# Patient Record
Sex: MALE | Race: WHITE | NOT HISPANIC OR LATINO | Employment: OTHER | URBAN - METROPOLITAN AREA
[De-identification: names, ages, dates, MRNs, and addresses within clinical notes are randomized per-mention and may not be internally consistent; named-entity substitution may affect disease eponyms.]

---

## 2019-04-15 ENCOUNTER — OFFICE VISIT (OUTPATIENT)
Dept: UROLOGY | Facility: CLINIC | Age: 59
End: 2019-04-15
Payer: COMMERCIAL

## 2019-04-15 VITALS
HEART RATE: 92 BPM | HEIGHT: 70 IN | DIASTOLIC BLOOD PRESSURE: 92 MMHG | WEIGHT: 175 LBS | SYSTOLIC BLOOD PRESSURE: 149 MMHG | BODY MASS INDEX: 25.05 KG/M2

## 2019-04-15 DIAGNOSIS — R35.0 URINARY FREQUENCY: ICD-10-CM

## 2019-04-15 DIAGNOSIS — N40.1 BPH WITH OBSTRUCTION/LOWER URINARY TRACT SYMPTOMS: ICD-10-CM

## 2019-04-15 DIAGNOSIS — R30.0 DYSURIA: Primary | ICD-10-CM

## 2019-04-15 DIAGNOSIS — N13.8 BPH WITH OBSTRUCTION/LOWER URINARY TRACT SYMPTOMS: ICD-10-CM

## 2019-04-15 PROCEDURE — 87086 URINE CULTURE/COLONY COUNT: CPT

## 2019-04-15 PROCEDURE — 99204 OFFICE O/P NEW MOD 45 MIN: CPT | Mod: S$GLB,,, | Performed by: NURSE PRACTITIONER

## 2019-04-15 PROCEDURE — 3008F BODY MASS INDEX DOCD: CPT | Mod: CPTII,S$GLB,, | Performed by: NURSE PRACTITIONER

## 2019-04-15 PROCEDURE — 3008F PR BODY MASS INDEX (BMI) DOCUMENTED: ICD-10-PCS | Mod: CPTII,S$GLB,, | Performed by: NURSE PRACTITIONER

## 2019-04-15 PROCEDURE — 99204 PR OFFICE/OUTPT VISIT, NEW, LEVL IV, 45-59 MIN: ICD-10-PCS | Mod: S$GLB,,, | Performed by: NURSE PRACTITIONER

## 2019-04-15 RX ORDER — TAMSULOSIN HYDROCHLORIDE 0.4 MG/1
0.4 CAPSULE ORAL DAILY
Qty: 30 CAPSULE | Refills: 11 | Status: SHIPPED | OUTPATIENT
Start: 2019-04-15 | End: 2020-02-12

## 2019-04-15 NOTE — PROGRESS NOTES
Subjective:      Gt Banerjee is a 58 y.o. male who was self-referred for evaluation of his urinary symptoms.      The patient presents today reporting dysuria x 1 week with worsening frequency and urgency. He reports a slow stream and straining to empty his bladder that has been present for months, also with frequency, urgency and nocturia 1-3x/night. Denies hematuria, flank pain and fever/chills. Denies a history of recurrent UTIs, prostatitis, and nephrolithiasis. Reports normal PSA levels (no records available). Denies a family history of prostate cancer.     The following portions of the patient's history were reviewed and updated as appropriate: allergies, current medications, past family history, past medical history, past social history, past surgical history and problem list.    Review of Systems  Constitutional: no fever or chills  ENT: no nasal congestion or sore throat  Respiratory: no cough or shortness of breath  Cardiovascular: no chest pain or palpitations  Gastrointestinal: no nausea or vomiting, tolerating diet  Genitourinary: as per HPI  Hematologic/Lymphatic: no easy bruising or lymphadenopathy  Musculoskeletal: no arthralgias or myalgias  Neurological: no seizures or tremors  Behavioral/Psych: no auditory or visual hallucinations     Objective:   Vitals:   Vitals:    04/15/19 0905   BP: (!) 149/92   Pulse: 92     Physical Exam   General: alert and oriented, no acute distress  Head: normocephalic, atraumatic  Neck: supple, no lymphadenopathy, normal ROM, no masses  Respiratory: Symmetric expansion, non-labored breathing  Cardiovascular: regular rate and rhythm, nomal pulses, no peripheral edema  Abdomen: soft, non tender, non distended, no palpable masses, no hernias, no hepatomegaly or splenomegaly  Genitourinary:   Prostate: enlarged: bilateral, size: 30 gm; seminal vesicles not palpated  Rectum: normal rectal tone, no rectal mass, normal perineum  Lymphatic: no inguinal nodes  Skin:  normal coloration and turgor, no rashes, no suspicious skin lesions noted  Neuro: alert and oriented x3, no gross deficits  Psych: normal judgment and insight, normal mood/affect and non-anxious  No CVA tenderness     Lab Review   Urinalysis demonstrates positive for leukocytes (trace)  Lab Results   Component Value Date    WBC 4.21 (L) 07/12/2011    HGB 14.5 07/12/2011    HCT 42.8 07/12/2011    MCV 93.2 07/12/2011     07/12/2011     Lab Results   Component Value Date    CREATININE 0.9 07/12/2011    BUN 18 07/12/2011     No results found for: PSA  Imaging   None    Assessment:   Dysuria  BPH   Urinary frequency    Plan:   Gt was seen today for dysuria.    Diagnoses and all orders for this visit:    Dysuria  -     Urine culture  -     tamsulosin (FLOMAX) 0.4 mg Cap; Take 1 capsule (0.4 mg total) by mouth once daily.    BPH with obstruction/lower urinary tract symptoms  -     Prostate Specific Antigen, Diagnostic; Future    Urinary frequency    Plan:  --Urine culture today, will notify if antibiotics are needed (pt prefers to hold on antibiotics for now)   --Trial of Flomax, reviewed possible SE   --PSA in 1 month  --Follow up in 6-8 weeks with PVR

## 2019-04-16 LAB — BACTERIA UR CULT: NO GROWTH

## 2019-07-17 ENCOUNTER — PATIENT MESSAGE (OUTPATIENT)
Dept: NEUROLOGY | Facility: CLINIC | Age: 59
End: 2019-07-17

## 2019-07-31 ENCOUNTER — LAB VISIT (OUTPATIENT)
Dept: LAB | Facility: HOSPITAL | Age: 59
End: 2019-07-31
Payer: COMMERCIAL

## 2019-07-31 ENCOUNTER — OFFICE VISIT (OUTPATIENT)
Dept: NEUROLOGY | Facility: CLINIC | Age: 59
End: 2019-07-31
Payer: COMMERCIAL

## 2019-07-31 VITALS
DIASTOLIC BLOOD PRESSURE: 86 MMHG | BODY MASS INDEX: 25.05 KG/M2 | HEIGHT: 70 IN | SYSTOLIC BLOOD PRESSURE: 131 MMHG | WEIGHT: 175 LBS | HEART RATE: 62 BPM

## 2019-07-31 DIAGNOSIS — Z86.73 HISTORY OF EMBOLIC STROKE: Primary | ICD-10-CM

## 2019-07-31 DIAGNOSIS — N40.1 BENIGN PROSTATIC HYPERPLASIA WITH URINARY FREQUENCY: ICD-10-CM

## 2019-07-31 DIAGNOSIS — R03.0 BORDERLINE SYSTOLIC HTN: ICD-10-CM

## 2019-07-31 DIAGNOSIS — R35.0 BENIGN PROSTATIC HYPERPLASIA WITH URINARY FREQUENCY: ICD-10-CM

## 2019-07-31 DIAGNOSIS — Z86.73 HISTORY OF ISCHEMIC VERTEBROBASILAR ARTERY BRAINSTEM STROKE: ICD-10-CM

## 2019-07-31 DIAGNOSIS — I63.9 LEFT PONTINE STROKE: ICD-10-CM

## 2019-07-31 DIAGNOSIS — N40.1 BPH WITH OBSTRUCTION/LOWER URINARY TRACT SYMPTOMS: ICD-10-CM

## 2019-07-31 DIAGNOSIS — N13.8 BPH WITH OBSTRUCTION/LOWER URINARY TRACT SYMPTOMS: ICD-10-CM

## 2019-07-31 DIAGNOSIS — Z86.73 HISTORY OF EMBOLIC STROKE: ICD-10-CM

## 2019-07-31 DIAGNOSIS — H51.22 INO (INTERNUCLEAR OPHTHALMOPLEGIA), LEFT: ICD-10-CM

## 2019-07-31 LAB
CHOLEST SERPL-MCNC: 213 MG/DL (ref 120–199)
CHOLEST/HDLC SERPL: 3.2 {RATIO} (ref 2–5)
COMPLEXED PSA SERPL-MCNC: 3.5 NG/ML (ref 0–4)
CREAT SERPL-MCNC: 0.9 MG/DL (ref 0.5–1.4)
EST. GFR  (AFRICAN AMERICAN): >60 ML/MIN/1.73 M^2
EST. GFR  (NON AFRICAN AMERICAN): >60 ML/MIN/1.73 M^2
ESTIMATED AVG GLUCOSE: 94 MG/DL (ref 68–131)
HBA1C MFR BLD HPLC: 4.9 % (ref 4–5.6)
HDLC SERPL-MCNC: 66 MG/DL (ref 40–75)
HDLC SERPL: 31 % (ref 20–50)
LDLC SERPL CALC-MCNC: 129 MG/DL (ref 63–159)
NONHDLC SERPL-MCNC: 147 MG/DL
TRIGL SERPL-MCNC: 90 MG/DL (ref 30–150)

## 2019-07-31 PROCEDURE — 83036 HEMOGLOBIN GLYCOSYLATED A1C: CPT

## 2019-07-31 PROCEDURE — 82565 ASSAY OF CREATININE: CPT

## 2019-07-31 PROCEDURE — 99205 OFFICE O/P NEW HI 60 MIN: CPT | Mod: S$GLB,,, | Performed by: PSYCHIATRY & NEUROLOGY

## 2019-07-31 PROCEDURE — 3008F BODY MASS INDEX DOCD: CPT | Mod: CPTII,S$GLB,, | Performed by: PSYCHIATRY & NEUROLOGY

## 2019-07-31 PROCEDURE — 3008F PR BODY MASS INDEX (BMI) DOCUMENTED: ICD-10-PCS | Mod: CPTII,S$GLB,, | Performed by: PSYCHIATRY & NEUROLOGY

## 2019-07-31 PROCEDURE — 99999 PR PBB SHADOW E&M-EST. PATIENT-LVL IV: ICD-10-PCS | Mod: PBBFAC,,, | Performed by: PSYCHIATRY & NEUROLOGY

## 2019-07-31 PROCEDURE — 84153 ASSAY OF PSA TOTAL: CPT

## 2019-07-31 PROCEDURE — 80061 LIPID PANEL: CPT

## 2019-07-31 PROCEDURE — 99205 PR OFFICE/OUTPT VISIT, NEW, LEVL V, 60-74 MIN: ICD-10-PCS | Mod: S$GLB,,, | Performed by: PSYCHIATRY & NEUROLOGY

## 2019-07-31 PROCEDURE — 36415 COLL VENOUS BLD VENIPUNCTURE: CPT

## 2019-07-31 PROCEDURE — 99999 PR PBB SHADOW E&M-EST. PATIENT-LVL IV: CPT | Mod: PBBFAC,,, | Performed by: PSYCHIATRY & NEUROLOGY

## 2019-07-31 RX ORDER — CLOPIDOGREL BISULFATE 75 MG/1
75 TABLET ORAL DAILY
Qty: 30 TABLET | Refills: 0 | Status: SHIPPED | OUTPATIENT
Start: 2019-07-31 | End: 2020-11-16
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
## 2019-07-31 NOTE — PATIENT INSTRUCTIONS
- Continue aspirin 81 mg daily. Start Plavix 75 mg daily x 1 month.  - Have blood work drawn today.  - Schedule CTA head/neck.  - Consider echocardiogram.

## 2019-08-01 ENCOUNTER — PATIENT MESSAGE (OUTPATIENT)
Dept: NEUROLOGY | Facility: CLINIC | Age: 59
End: 2019-08-01

## 2019-08-01 ENCOUNTER — PATIENT MESSAGE (OUTPATIENT)
Dept: NEUROLOGY | Facility: HOSPITAL | Age: 59
End: 2019-08-01

## 2019-08-01 PROBLEM — I63.9 LEFT PONTINE STROKE: Status: ACTIVE | Noted: 2019-08-01

## 2019-08-01 PROBLEM — H51.22 INO (INTERNUCLEAR OPHTHALMOPLEGIA), LEFT: Status: ACTIVE | Noted: 2019-08-01

## 2019-08-01 PROBLEM — R03.0 BORDERLINE SYSTOLIC HTN: Status: ACTIVE | Noted: 2019-08-01

## 2019-08-01 PROBLEM — R35.0 BENIGN PROSTATIC HYPERPLASIA WITH URINARY FREQUENCY: Status: ACTIVE | Noted: 2019-08-01

## 2019-08-01 PROBLEM — H51.22 INO (INTERNUCLEAR OPHTHALMOPLEGIA), LEFT: Status: RESOLVED | Noted: 2019-08-01 | Resolved: 2019-08-01

## 2019-08-01 PROBLEM — I63.9 LEFT PONTINE STROKE: Status: RESOLVED | Noted: 2019-08-01 | Resolved: 2019-08-01

## 2019-08-01 PROBLEM — Z86.73 HISTORY OF ISCHEMIC VERTEBROBASILAR ARTERY BRAINSTEM STROKE: Status: ACTIVE | Noted: 2019-08-01

## 2019-08-01 PROBLEM — N40.1 BENIGN PROSTATIC HYPERPLASIA WITH URINARY FREQUENCY: Status: ACTIVE | Noted: 2019-08-01

## 2019-08-02 RX ORDER — ATORVASTATIN CALCIUM 20 MG/1
20 TABLET, FILM COATED ORAL DAILY
Qty: 90 TABLET | Refills: 3 | Status: SHIPPED | OUTPATIENT
Start: 2019-08-02 | End: 2020-11-18

## 2019-08-06 ENCOUNTER — HOSPITAL ENCOUNTER (OUTPATIENT)
Dept: RADIOLOGY | Facility: HOSPITAL | Age: 59
Discharge: HOME | End: 2019-08-06
Attending: PSYCHIATRY & NEUROLOGY
Payer: COMMERCIAL

## 2019-08-06 DIAGNOSIS — Z86.73 HISTORY OF EMBOLIC STROKE: ICD-10-CM

## 2019-08-06 PROCEDURE — 70498 CTA HEAD AND NECK (XPD): ICD-10-PCS | Mod: 26,,, | Performed by: RADIOLOGY

## 2019-08-06 PROCEDURE — 70496 CT ANGIOGRAPHY HEAD: CPT | Mod: 26,,, | Performed by: RADIOLOGY

## 2019-08-06 PROCEDURE — 70496 CTA HEAD AND NECK (XPD): ICD-10-PCS | Mod: 26,,, | Performed by: RADIOLOGY

## 2019-08-06 PROCEDURE — 70498 CT ANGIOGRAPHY NECK: CPT | Mod: 26,,, | Performed by: RADIOLOGY

## 2019-08-06 PROCEDURE — 25500020 PHARM REV CODE 255: Performed by: PSYCHIATRY & NEUROLOGY

## 2019-08-06 PROCEDURE — 70496 CT ANGIOGRAPHY HEAD: CPT | Mod: TC

## 2019-08-06 RX ADMIN — IOHEXOL 100 ML: 350 INJECTION, SOLUTION INTRAVENOUS at 09:08

## 2019-08-07 ENCOUNTER — PATIENT MESSAGE (OUTPATIENT)
Dept: NEUROLOGY | Facility: CLINIC | Age: 59
End: 2019-08-07

## 2019-08-08 ENCOUNTER — PATIENT MESSAGE (OUTPATIENT)
Dept: NEUROLOGY | Facility: CLINIC | Age: 59
End: 2019-08-08

## 2019-08-22 ENCOUNTER — PATIENT MESSAGE (OUTPATIENT)
Dept: NEUROLOGY | Facility: CLINIC | Age: 59
End: 2019-08-22

## 2019-09-12 ENCOUNTER — PATIENT MESSAGE (OUTPATIENT)
Dept: NEUROLOGY | Facility: CLINIC | Age: 59
End: 2019-09-12

## 2019-09-13 DIAGNOSIS — Z86.73 HISTORY OF ISCHEMIC VERTEBROBASILAR ARTERY BRAINSTEM STROKE: Primary | ICD-10-CM

## 2019-09-23 ENCOUNTER — HOSPITAL ENCOUNTER (OUTPATIENT)
Dept: CARDIOLOGY | Facility: CLINIC | Age: 59
Discharge: HOME | End: 2019-09-23
Attending: PSYCHIATRY & NEUROLOGY
Payer: COMMERCIAL

## 2019-09-23 VITALS
DIASTOLIC BLOOD PRESSURE: 78 MMHG | BODY MASS INDEX: 25.05 KG/M2 | HEART RATE: 54 BPM | WEIGHT: 175 LBS | HEIGHT: 70 IN | SYSTOLIC BLOOD PRESSURE: 120 MMHG

## 2019-09-23 DIAGNOSIS — Z86.73 HISTORY OF ISCHEMIC VERTEBROBASILAR ARTERY BRAINSTEM STROKE: ICD-10-CM

## 2019-09-23 LAB
ASCENDING AORTA: 3.53 CM
AV INDEX (PROSTH): 0.87
AV MEAN GRADIENT: 2 MMHG
AV PEAK GRADIENT: 4 MMHG
AV VALVE AREA: 3.67 CM2
AV VELOCITY RATIO: 0.81
BSA FOR ECHO PROCEDURE: 1.98 M2
CV ECHO LV RWT: 0.35 CM
DOP CALC AO PEAK VEL: 1.01 M/S
DOP CALC AO VTI: 22.48 CM
DOP CALC LVOT AREA: 4.2 CM2
DOP CALC LVOT DIAMETER: 2.32 CM
DOP CALC LVOT PEAK VEL: 0.82 M/S
DOP CALC LVOT STROKE VOLUME: 82.6 CM3
DOP CALCLVOT PEAK VEL VTI: 19.55 CM
E WAVE DECELERATION TIME: 270.3 MSEC
E/A RATIO: 0.92
E/E' RATIO: 6.13 M/S
ECHO LV POSTERIOR WALL: 0.87 CM (ref 0.6–1.1)
FRACTIONAL SHORTENING: 34 % (ref 28–44)
INTERVENTRICULAR SEPTUM: 0.83 CM (ref 0.6–1.1)
IVRT: 0.16 MSEC
LA MAJOR: 5.23 CM
LA MINOR: 5.44 CM
LA WIDTH: 4.35 CM
LEFT ATRIUM SIZE: 4.18 CM
LEFT ATRIUM VOLUME INDEX: 41.8 ML/M2
LEFT ATRIUM VOLUME: 82.42 CM3
LEFT INTERNAL DIMENSION IN SYSTOLE: 3.31 CM (ref 2.1–4)
LEFT VENTRICLE DIASTOLIC VOLUME INDEX: 61.09 ML/M2
LEFT VENTRICLE DIASTOLIC VOLUME: 120.47 ML
LEFT VENTRICLE MASS INDEX: 75 G/M2
LEFT VENTRICLE SYSTOLIC VOLUME INDEX: 22.5 ML/M2
LEFT VENTRICLE SYSTOLIC VOLUME: 44.39 ML
LEFT VENTRICULAR INTERNAL DIMENSION IN DIASTOLE: 5.04 CM (ref 3.5–6)
LEFT VENTRICULAR MASS: 148.83 G
LV LATERAL E/E' RATIO: 5.11 M/S
LV SEPTAL E/E' RATIO: 7.67 M/S
MV PEAK A VEL: 0.5 M/S
MV PEAK E VEL: 0.46 M/S
PISA TR MAX VEL: 1.82 M/S
RA MAJOR: 5.28 CM
RA PRESSURE: 3 MMHG
RA WIDTH: 4.11 CM
RIGHT VENTRICULAR END-DIASTOLIC DIMENSION: 4.99 CM
RV TISSUE DOPPLER FREE WALL SYSTOLIC VELOCITY 1 (APICAL 4 CHAMBER VIEW): 11.58 CM/S
SINUS: 3.76 CM
STJ: 3.45 CM
TDI LATERAL: 0.09 M/S
TDI SEPTAL: 0.06 M/S
TDI: 0.08 M/S
TR MAX PG: 13 MMHG
TRICUSPID ANNULAR PLANE SYSTOLIC EXCURSION: 2.54 CM
TV REST PULMONARY ARTERY PRESSURE: 16 MMHG

## 2019-09-23 PROCEDURE — 93306 TTE W/DOPPLER COMPLETE: CPT | Mod: 26,,, | Performed by: INTERNAL MEDICINE

## 2019-09-23 PROCEDURE — 93306 ECHO (CUPID ONLY): ICD-10-PCS | Mod: 26,,, | Performed by: INTERNAL MEDICINE

## 2019-09-23 PROCEDURE — 93306 TTE W/DOPPLER COMPLETE: CPT

## 2019-09-24 DIAGNOSIS — Z86.73 HISTORY OF ISCHEMIC VERTEBROBASILAR ARTERY BRAINSTEM STROKE: Primary | ICD-10-CM

## 2019-09-26 ENCOUNTER — PATIENT MESSAGE (OUTPATIENT)
Dept: NEUROLOGY | Facility: CLINIC | Age: 59
End: 2019-09-26

## 2019-09-27 ENCOUNTER — TELEPHONE (OUTPATIENT)
Dept: NEUROLOGY | Facility: CLINIC | Age: 59
End: 2019-09-27

## 2019-09-27 NOTE — TELEPHONE ENCOUNTER
----- Message from Meghan Cesar sent at 9/27/2019  2:28 PM CDT -----  Contact: pt   Pt is calling to speak with the nurse about his appt on 10/2/2019 to  his cardiac monitor pt needs to pick it up asap can you please call pt at [phone] jc

## 2019-10-02 ENCOUNTER — CLINICAL SUPPORT (OUTPATIENT)
Dept: CARDIOLOGY | Facility: HOSPITAL | Age: 59
End: 2019-10-02
Attending: PSYCHIATRY & NEUROLOGY
Payer: COMMERCIAL

## 2019-10-02 DIAGNOSIS — Z86.73 HISTORY OF ISCHEMIC VERTEBROBASILAR ARTERY BRAINSTEM STROKE: ICD-10-CM

## 2019-10-02 PROCEDURE — 93271 ECG/MONITORING AND ANALYSIS: CPT

## 2019-10-02 PROCEDURE — 93272 CARDIAC EVENT MONITOR (CUPID ONLY): ICD-10-PCS | Mod: ,,, | Performed by: INTERNAL MEDICINE

## 2019-10-02 PROCEDURE — 93272 ECG/REVIEW INTERPRET ONLY: CPT | Mod: ,,, | Performed by: INTERNAL MEDICINE

## 2019-10-07 ENCOUNTER — PATIENT MESSAGE (OUTPATIENT)
Dept: NEUROLOGY | Facility: CLINIC | Age: 59
End: 2019-10-07

## 2019-10-10 ENCOUNTER — PATIENT MESSAGE (OUTPATIENT)
Dept: NEUROLOGY | Facility: CLINIC | Age: 59
End: 2019-10-10

## 2019-10-10 ENCOUNTER — TELEPHONE (OUTPATIENT)
Dept: CARDIOLOGY | Facility: HOSPITAL | Age: 59
End: 2019-10-10

## 2019-10-10 NOTE — TELEPHONE ENCOUNTER
----- Message from Brook Helm sent at 10/10/2019  4:16 PM CDT -----  Contact: Patient  The pt is calling to speak with someone regarding his holter. Please call him back @ [phone]. Thanks, Brook

## 2019-10-11 NOTE — PROGRESS NOTES
Patient received alert from cardiac monitoring company regarding bradycardia (39 while sleeping).  He was asymptomatic.  Confirmed with EP that rhythm was sinus bradycardia due to 1st degree AV block.  Patient requests referral to cardiology for further evaluation.  Order placed.    Ely Smiley MD  Vascular Neurology

## 2019-10-18 ENCOUNTER — OFFICE VISIT (OUTPATIENT)
Dept: CARDIOLOGY | Facility: CLINIC | Age: 59
End: 2019-10-18
Payer: COMMERCIAL

## 2019-10-18 VITALS
BODY MASS INDEX: 26.82 KG/M2 | DIASTOLIC BLOOD PRESSURE: 79 MMHG | HEIGHT: 70 IN | SYSTOLIC BLOOD PRESSURE: 132 MMHG | WEIGHT: 187.38 LBS | HEART RATE: 49 BPM

## 2019-10-18 DIAGNOSIS — Z86.73 HISTORY OF ISCHEMIC VERTEBROBASILAR ARTERY BRAINSTEM STROKE: Primary | ICD-10-CM

## 2019-10-18 DIAGNOSIS — R03.0 BORDERLINE SYSTOLIC HTN: ICD-10-CM

## 2019-10-18 DIAGNOSIS — R03.0 BORDERLINE SYSTOLIC HTN: Primary | ICD-10-CM

## 2019-10-18 PROCEDURE — 99999 PR PBB SHADOW E&M-EST. PATIENT-LVL III: ICD-10-PCS | Mod: PBBFAC,,, | Performed by: INTERNAL MEDICINE

## 2019-10-18 PROCEDURE — 99244 OFF/OP CNSLTJ NEW/EST MOD 40: CPT | Mod: S$GLB,,, | Performed by: INTERNAL MEDICINE

## 2019-10-18 PROCEDURE — 99999 PR PBB SHADOW E&M-EST. PATIENT-LVL III: CPT | Mod: PBBFAC,,, | Performed by: INTERNAL MEDICINE

## 2019-10-18 PROCEDURE — 99244 PR OFFICE CONSULTATION,LEVEL IV: ICD-10-PCS | Mod: S$GLB,,, | Performed by: INTERNAL MEDICINE

## 2019-10-18 PROCEDURE — 93000 ELECTROCARDIOGRAM COMPLETE: CPT | Mod: S$GLB,,, | Performed by: INTERNAL MEDICINE

## 2019-10-18 PROCEDURE — 93000 EKG 12-LEAD: ICD-10-PCS | Mod: S$GLB,,, | Performed by: INTERNAL MEDICINE

## 2019-10-18 RX ORDER — ASPIRIN 81 MG/1
81 TABLET ORAL DAILY
COMMUNITY

## 2019-10-18 NOTE — LETTER
October 18, 2019      Ely Smiley MD  [address]  [facility] [address]           [facility] Card[facility]  [address]  [facility] [address]  Phone: [phone]          Patient: Gt Banerjee   MR Number: [id number]   YOB: 1960   Date of Visit: 10/18/2019       Dear Dr. Ely Smiley:    Thank you for referring Gt Banerjee to me for evaluation. Attached you will find relevant portions of my assessment and plan of care.    If you have questions, please do not hesitate to call me. I look forward to following Gt Banerjee along with you.    Sincerely,    Walter Braga MD    Enclosure  CC:  No Recipients    If you would like to receive this communication electronically, please contact [email or web address] or [phone] to request more information on [employer] access.    For providers and/or their staff who would like to refer a patient to [facility], please contact us through our one-stop-shop provider referral line, [facility] , at [phone].    If you feel you have received this communication in error or would no longer like to receive these types of communications, please e-mail [email or web address]
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
## 2019-10-25 ENCOUNTER — HOSPITAL ENCOUNTER (OUTPATIENT)
Dept: CARDIOLOGY | Facility: CLINIC | Age: 59
Discharge: HOME | End: 2019-10-25
Attending: INTERNAL MEDICINE
Payer: COMMERCIAL

## 2019-10-25 VITALS
HEART RATE: 70 BPM | WEIGHT: 187 LBS | BODY MASS INDEX: 26.77 KG/M2 | SYSTOLIC BLOOD PRESSURE: 138 MMHG | HEIGHT: 70 IN | DIASTOLIC BLOOD PRESSURE: 64 MMHG

## 2019-10-25 DIAGNOSIS — Z86.73 HISTORY OF ISCHEMIC VERTEBROBASILAR ARTERY BRAINSTEM STROKE: ICD-10-CM

## 2019-10-25 LAB
ASCENDING AORTA: 3.96 CM
BSA FOR ECHO PROCEDURE: 2.05 M2
CV ECHO LV RWT: 0.3 CM
DOP CALC LVOT AREA: 4.1 CM2
DOP CALC LVOT DIAMETER: 2.28 CM
DOP CALC LVOT PEAK VEL: 0.87 M/S
DOP CALC LVOT STROKE VOLUME: 75.82 CM3
DOP CALCLVOT PEAK VEL VTI: 18.58 CM
E WAVE DECELERATION TIME: 186.65 MSEC
E/A RATIO: 0.47
E/E' RATIO: 4.88 M/S
ECHO LV POSTERIOR WALL: 0.83 CM (ref 0.6–1.1)
FRACTIONAL SHORTENING: 35 % (ref 28–44)
INTERVENTRICULAR SEPTUM: 0.54 CM (ref 0.6–1.1)
IVRT: 0.11 MSEC
LA MAJOR: 4.88 CM
LA MINOR: 6.04 CM
LA WIDTH: 4.28 CM
LEFT ATRIUM SIZE: 4.2 CM
LEFT ATRIUM VOLUME INDEX: 40.7 ML/M2
LEFT ATRIUM VOLUME: 82.49 CM3
LEFT INTERNAL DIMENSION IN SYSTOLE: 3.68 CM (ref 2.1–4)
LEFT VENTRICLE DIASTOLIC VOLUME INDEX: 76.49 ML/M2
LEFT VENTRICLE DIASTOLIC VOLUME: 155.16 ML
LEFT VENTRICLE MASS INDEX: 68 G/M2
LEFT VENTRICLE SYSTOLIC VOLUME INDEX: 28.3 ML/M2
LEFT VENTRICLE SYSTOLIC VOLUME: 57.4 ML
LEFT VENTRICULAR INTERNAL DIMENSION IN DIASTOLE: 5.62 CM (ref 3.5–6)
LEFT VENTRICULAR MASS: 137.07 G
LV LATERAL E/E' RATIO: 4.33 M/S
LV SEPTAL E/E' RATIO: 5.57 M/S
MV PEAK A VEL: 0.83 M/S
MV PEAK E VEL: 0.39 M/S
RA MAJOR: 5.68 CM
RA PRESSURE: 3 MMHG
RA WIDTH: 3.91 CM
RIGHT VENTRICULAR END-DIASTOLIC DIMENSION: 4.68 CM
RV TISSUE DOPPLER FREE WALL SYSTOLIC VELOCITY 1 (APICAL 4 CHAMBER VIEW): 13.1 CM/S
SINUS: 3.99 CM
STJ: 3.84 CM
TDI LATERAL: 0.09 M/S
TDI SEPTAL: 0.07 M/S
TDI: 0.08 M/S
TRICUSPID ANNULAR PLANE SYSTOLIC EXCURSION: 3.15 CM

## 2019-10-25 PROCEDURE — 93308 TTE F-UP OR LMTD: CPT | Mod: 26,,, | Performed by: INTERNAL MEDICINE

## 2019-10-25 PROCEDURE — 93308 TTE F-UP OR LMTD: CPT

## 2019-10-25 PROCEDURE — 93308 ECHO (CUPID ONLY): ICD-10-PCS | Mod: 26,,, | Performed by: INTERNAL MEDICINE

## 2019-10-25 NOTE — NURSING
Patient identified by 2 identifiers.   Allergies reviewed.  20 g IV placed to Lt AC.  Bubble study explained to patient, patient verbalized understanding.  Bubble performed X 2, with & without Valsalva.  Pt tolerated procedure well.  IV d/c with catheter intact, pressure dsg applied.

## 2019-10-29 ENCOUNTER — PATIENT MESSAGE (OUTPATIENT)
Dept: CARDIOLOGY | Facility: CLINIC | Age: 59
End: 2019-10-29

## 2019-11-01 ENCOUNTER — DOCUMENTATION ONLY (OUTPATIENT)
Dept: CARDIOLOGY | Facility: CLINIC | Age: 59
End: 2019-11-01

## 2019-11-01 ENCOUNTER — TELEPHONE (OUTPATIENT)
Dept: CARDIOLOGY | Facility: CLINIC | Age: 59
End: 2019-11-01

## 2019-11-01 NOTE — TELEPHONE ENCOUNTER
Spoke with Maria Dolores in echo, bubble images needs to be repeated left message for patient to call to reschedule bubble study only.

## 2019-11-01 NOTE — TELEPHONE ENCOUNTER
----- Message from Walter Braga MD sent at 11/1/2019  6:51 AM CDT -----  Contact: patient called  Get report ASAP PB  ----- Message -----  From: Audrey Kamara MA  Sent: 10/31/2019  10:10 AM CDT  To: Walter Braga MD    Spoke with patient, he is going out of town tommorow would like the final result before he leaves.  ----- Message -----  From: Belle Arthur MA  Sent: 10/31/2019   9:24 AM CDT  To: Maria Luz Ventura please call the patient at [phone] he need to talk to you about his test results. Thank you.

## 2019-11-04 ENCOUNTER — PATIENT MESSAGE (OUTPATIENT)
Dept: NEUROLOGY | Facility: CLINIC | Age: 59
End: 2019-11-04

## 2019-11-04 DIAGNOSIS — R03.0 BORDERLINE SYSTOLIC HTN: Primary | ICD-10-CM

## 2019-11-04 DIAGNOSIS — Z86.73 HISTORY OF ISCHEMIC VERTEBROBASILAR ARTERY BRAINSTEM STROKE: ICD-10-CM

## 2019-11-05 ENCOUNTER — TELEPHONE (OUTPATIENT)
Dept: CARDIOLOGY | Facility: CLINIC | Age: 59
End: 2019-11-05

## 2019-11-05 DIAGNOSIS — Q21.12 PFO (PATENT FORAMEN OVALE): ICD-10-CM

## 2019-11-05 DIAGNOSIS — I63.419 CEREBROVASCULAR ACCIDENT (CVA) DUE TO EMBOLISM OF MIDDLE CEREBRAL ARTERY, UNSPECIFIED BLOOD VESSEL LATERALITY: Primary | ICD-10-CM

## 2019-11-07 ENCOUNTER — PATIENT MESSAGE (OUTPATIENT)
Dept: CARDIOLOGY | Facility: CLINIC | Age: 59
End: 2019-11-07

## 2019-11-25 ENCOUNTER — INITIAL CONSULT (OUTPATIENT)
Dept: CARDIOLOGY | Facility: CLINIC | Age: 59
End: 2019-11-25
Payer: COMMERCIAL

## 2019-11-25 ENCOUNTER — LAB VISIT (OUTPATIENT)
Dept: LAB | Facility: HOSPITAL | Age: 59
End: 2019-11-25
Payer: COMMERCIAL

## 2019-11-25 VITALS
DIASTOLIC BLOOD PRESSURE: 85 MMHG | OXYGEN SATURATION: 98 % | WEIGHT: 187.19 LBS | SYSTOLIC BLOOD PRESSURE: 132 MMHG | HEIGHT: 70 IN | HEART RATE: 50 BPM | BODY MASS INDEX: 26.8 KG/M2

## 2019-11-25 DIAGNOSIS — Q21.12 PFO (PATENT FORAMEN OVALE): Primary | ICD-10-CM

## 2019-11-25 DIAGNOSIS — Q21.12 PFO (PATENT FORAMEN OVALE): ICD-10-CM

## 2019-11-25 DIAGNOSIS — I25.3 ATRIAL SEPTAL ANEURYSM: ICD-10-CM

## 2019-11-25 DIAGNOSIS — Z86.73 HISTORY OF ISCHEMIC VERTEBROBASILAR ARTERY BRAINSTEM STROKE: Primary | ICD-10-CM

## 2019-11-25 DIAGNOSIS — I63.9 CRYPTOGENIC STROKE: ICD-10-CM

## 2019-11-25 DIAGNOSIS — R03.0 BORDERLINE SYSTOLIC HTN: ICD-10-CM

## 2019-11-25 LAB
ANION GAP SERPL CALC-SCNC: 6 MMOL/L (ref 8–16)
APTT BLDCRRT: 24.4 SEC (ref 21–32)
BASOPHILS # BLD AUTO: 0.02 K/UL (ref 0–0.2)
BASOPHILS NFR BLD: 0.4 % (ref 0–1.9)
BUN SERPL-MCNC: 22 MG/DL (ref 8–23)
CALCIUM SERPL-MCNC: 9.3 MG/DL (ref 8.7–10.5)
CHLORIDE SERPL-SCNC: 104 MMOL/L (ref 95–110)
CO2 SERPL-SCNC: 28 MMOL/L (ref 23–29)
CREAT SERPL-MCNC: 0.9 MG/DL (ref 0.5–1.4)
DIFFERENTIAL METHOD: ABNORMAL
EOSINOPHIL # BLD AUTO: 0 K/UL (ref 0–0.5)
EOSINOPHIL NFR BLD: 0.9 % (ref 0–8)
ERYTHROCYTE [DISTWIDTH] IN BLOOD BY AUTOMATED COUNT: 11.4 % (ref 11.5–14.5)
EST. GFR  (AFRICAN AMERICAN): >60 ML/MIN/1.73 M^2
EST. GFR  (NON AFRICAN AMERICAN): >60 ML/MIN/1.73 M^2
GLUCOSE SERPL-MCNC: 92 MG/DL (ref 70–110)
HCT VFR BLD AUTO: 46.5 % (ref 40–54)
HGB BLD-MCNC: 15.7 G/DL (ref 14–18)
IMM GRANULOCYTES # BLD AUTO: 0.01 K/UL (ref 0–0.04)
IMM GRANULOCYTES NFR BLD AUTO: 0.2 % (ref 0–0.5)
INR PPP: 1 (ref 0.8–1.2)
LYMPHOCYTES # BLD AUTO: 1.1 K/UL (ref 1–4.8)
LYMPHOCYTES NFR BLD: 24.7 % (ref 18–48)
MCH RBC QN AUTO: 32.8 PG (ref 27–31)
MCHC RBC AUTO-ENTMCNC: 33.8 G/DL (ref 32–36)
MCV RBC AUTO: 97 FL (ref 82–98)
MONOCYTES # BLD AUTO: 0.4 K/UL (ref 0.3–1)
MONOCYTES NFR BLD: 9.1 % (ref 4–15)
NEUTROPHILS # BLD AUTO: 2.9 K/UL (ref 1.8–7.7)
NEUTROPHILS NFR BLD: 64.7 % (ref 38–73)
NRBC BLD-RTO: 0 /100 WBC
PLATELET # BLD AUTO: 204 K/UL (ref 150–350)
PMV BLD AUTO: 10.6 FL (ref 9.2–12.9)
POTASSIUM SERPL-SCNC: 4.3 MMOL/L (ref 3.5–5.1)
PROTHROMBIN TIME: 10.3 SEC (ref 9–12.5)
RBC # BLD AUTO: 4.78 M/UL (ref 4.6–6.2)
SODIUM SERPL-SCNC: 138 MMOL/L (ref 136–145)
WBC # BLD AUTO: 4.49 K/UL (ref 3.9–12.7)

## 2019-11-25 PROCEDURE — 85730 THROMBOPLASTIN TIME PARTIAL: CPT

## 2019-11-25 PROCEDURE — 80048 BASIC METABOLIC PNL TOTAL CA: CPT

## 2019-11-25 PROCEDURE — 3008F PR BODY MASS INDEX (BMI) DOCUMENTED: ICD-10-PCS | Mod: CPTII,S$GLB,, | Performed by: INTERNAL MEDICINE

## 2019-11-25 PROCEDURE — 3008F BODY MASS INDEX DOCD: CPT | Mod: CPTII,S$GLB,, | Performed by: INTERNAL MEDICINE

## 2019-11-25 PROCEDURE — 99204 OFFICE O/P NEW MOD 45 MIN: CPT | Mod: S$GLB,,, | Performed by: INTERNAL MEDICINE

## 2019-11-25 PROCEDURE — 99999 PR PBB SHADOW E&M-EST. PATIENT-LVL III: CPT | Mod: PBBFAC,,, | Performed by: INTERNAL MEDICINE

## 2019-11-25 PROCEDURE — 99999 PR PBB SHADOW E&M-EST. PATIENT-LVL III: ICD-10-PCS | Mod: PBBFAC,,, | Performed by: INTERNAL MEDICINE

## 2019-11-25 PROCEDURE — 99204 PR OFFICE/OUTPT VISIT, NEW, LEVL IV, 45-59 MIN: ICD-10-PCS | Mod: S$GLB,,, | Performed by: INTERNAL MEDICINE

## 2019-11-25 PROCEDURE — 36415 COLL VENOUS BLD VENIPUNCTURE: CPT

## 2019-11-25 PROCEDURE — 85025 COMPLETE CBC W/AUTO DIFF WBC: CPT

## 2019-11-25 PROCEDURE — 85610 PROTHROMBIN TIME: CPT

## 2019-11-25 RX ORDER — DIPHENHYDRAMINE HCL 25 MG
50 CAPSULE ORAL ONCE
Status: CANCELLED | OUTPATIENT
Start: 2019-11-25 | End: 2019-11-25

## 2019-11-25 RX ORDER — CLOPIDOGREL BISULFATE 75 MG/1
75 TABLET ORAL DAILY
Qty: 30 TABLET | Refills: 11 | Status: SHIPPED | OUTPATIENT
Start: 2019-11-25 | End: 2020-11-16

## 2019-11-25 NOTE — LETTER
November 25, 2019      Walter Braga MD  [address]  [facility] [address]           [facility]Inter[facility]  [address]  [facility] [address]  Phone: [phone]          Patient: Gt Banerjee   MR Number: [id number]   YOB: 1960   Date of Visit: 11/25/2019       Dear Dr. Walter Braga:    Thank you for referring Gt Banerjee to me for evaluation. Attached you will find relevant portions of my assessment and plan of care.    If you have questions, please do not hesitate to call me. I look forward to following Gt Banerjee along with you.    Sincerely,    Estelle Hernadez RN    Enclosure  CC:  No Recipients    If you would like to receive this communication electronically, please contact [email or web address] or [phone] to request more information on [employer] access.    For providers and/or their staff who would like to refer a patient to [facility], please contact us through our one-stop-shop provider referral line, [facility], at [phone].    If you feel you have received this communication in error or would no longer like to receive these types of communications, please e-mail [email or web address]
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
## 2019-11-25 NOTE — ASSESSMENT & PLAN NOTE
-PFO closure via R CFV  -IVF at 3 ml/hour  -The risks, benefits & alternatives of the procedure were explained to the patient.    -The risks of PFO closure include but are not limited to:  Bleeding, infection, heart rhythm abnormalities, allergic reactions, kidney injury, stroke and death.    -The risks of moderate sedation include hypotension, respiratory depression, arrhythmias, bronchospasm, & death.    -Informed consent was obtained & the patient is agreeable to proceed with the procedure.  -Continue ASA 81 mg po daily.
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
## 2019-11-29 ENCOUNTER — HOSPITAL ENCOUNTER (OUTPATIENT)
Dept: CARDIOLOGY | Facility: CLINIC | Age: 59
Discharge: HOME | End: 2019-11-29
Attending: STUDENT IN AN ORGANIZED HEALTH CARE EDUCATION/TRAINING PROGRAM
Payer: COMMERCIAL

## 2019-11-29 ENCOUNTER — HOSPITAL ENCOUNTER (OUTPATIENT)
Facility: HOSPITAL | Age: 59
Discharge: HOME | End: 2019-11-29
Attending: INTERNAL MEDICINE | Admitting: INTERNAL MEDICINE
Payer: COMMERCIAL

## 2019-11-29 ENCOUNTER — ANESTHESIA EVENT (OUTPATIENT)
Dept: MEDSURG UNIT | Facility: HOSPITAL | Age: 59
End: 2019-11-29
Payer: COMMERCIAL

## 2019-11-29 ENCOUNTER — ANESTHESIA (OUTPATIENT)
Dept: MEDSURG UNIT | Facility: HOSPITAL | Age: 59
End: 2019-11-29
Payer: COMMERCIAL

## 2019-11-29 VITALS
HEART RATE: 51 BPM | SYSTOLIC BLOOD PRESSURE: 119 MMHG | OXYGEN SATURATION: 100 % | WEIGHT: 180 LBS | TEMPERATURE: 97 F | RESPIRATION RATE: 18 BRPM | DIASTOLIC BLOOD PRESSURE: 79 MMHG | HEIGHT: 70 IN | BODY MASS INDEX: 25.77 KG/M2

## 2019-11-29 DIAGNOSIS — I63.9 CRYPTOGENIC STROKE: ICD-10-CM

## 2019-11-29 DIAGNOSIS — I25.3 ATRIAL SEPTAL ANEURYSM: ICD-10-CM

## 2019-11-29 DIAGNOSIS — Q21.12 PFO (PATENT FORAMEN OVALE): ICD-10-CM

## 2019-11-29 DIAGNOSIS — Z86.73 HISTORY OF ISCHEMIC VERTEBROBASILAR ARTERY BRAINSTEM STROKE: Primary | ICD-10-CM

## 2019-11-29 LAB — BSA FOR ECHO PROCEDURE: 2.01 M2

## 2019-11-29 PROCEDURE — 25000003 PHARM REV CODE 250: Performed by: NURSE ANESTHETIST, CERTIFIED REGISTERED

## 2019-11-29 PROCEDURE — 36415 COLL VENOUS BLD VENIPUNCTURE: CPT

## 2019-11-29 PROCEDURE — 63600175 PHARM REV CODE 636 W HCPCS: Performed by: NURSE ANESTHETIST, CERTIFIED REGISTERED

## 2019-11-29 PROCEDURE — D9220A PRA ANESTHESIA: Mod: CRNA,,, | Performed by: NURSE ANESTHETIST, CERTIFIED REGISTERED

## 2019-11-29 PROCEDURE — 37000009 HC ANESTHESIA EA ADD 15 MINS

## 2019-11-29 PROCEDURE — 93320 DOPPLER ECHO COMPLETE: CPT | Mod: 26,,, | Performed by: INTERNAL MEDICINE

## 2019-11-29 PROCEDURE — 93010 EKG 12-LEAD: ICD-10-PCS | Mod: ,,, | Performed by: INTERNAL MEDICINE

## 2019-11-29 PROCEDURE — 93312 ECHO TRANSESOPHAGEAL: CPT | Mod: 26,,, | Performed by: INTERNAL MEDICINE

## 2019-11-29 PROCEDURE — D9220A PRA ANESTHESIA: ICD-10-PCS | Mod: CRNA,,, | Performed by: NURSE ANESTHETIST, CERTIFIED REGISTERED

## 2019-11-29 PROCEDURE — 63600175 PHARM REV CODE 636 W HCPCS: Performed by: INTERNAL MEDICINE

## 2019-11-29 PROCEDURE — 93320 DOPPLER ECHO COMPLETE: CPT

## 2019-11-29 PROCEDURE — 37000008 HC ANESTHESIA 1ST 15 MINUTES

## 2019-11-29 PROCEDURE — D9220A PRA ANESTHESIA: ICD-10-PCS | Mod: ANES,,, | Performed by: ANESTHESIOLOGY

## 2019-11-29 PROCEDURE — 93312 TRANSESOPHAGEAL ECHO (TEE) (CUPID ONLY): ICD-10-PCS | Mod: 26,,, | Performed by: INTERNAL MEDICINE

## 2019-11-29 PROCEDURE — 93005 ELECTROCARDIOGRAM TRACING: CPT

## 2019-11-29 PROCEDURE — 93010 ELECTROCARDIOGRAM REPORT: CPT | Mod: ,,, | Performed by: INTERNAL MEDICINE

## 2019-11-29 PROCEDURE — 93320 TRANSESOPHAGEAL ECHO (TEE) (CUPID ONLY): ICD-10-PCS | Mod: 26,,, | Performed by: INTERNAL MEDICINE

## 2019-11-29 PROCEDURE — 93325 TRANSESOPHAGEAL ECHO (TEE) (CUPID ONLY): ICD-10-PCS | Mod: 26,,, | Performed by: INTERNAL MEDICINE

## 2019-11-29 PROCEDURE — 93325 DOPPLER ECHO COLOR FLOW MAPG: CPT | Mod: 26,,, | Performed by: INTERNAL MEDICINE

## 2019-11-29 PROCEDURE — D9220A PRA ANESTHESIA: Mod: ANES,,, | Performed by: ANESTHESIOLOGY

## 2019-11-29 RX ORDER — GLYCOPYRROLATE 0.2 MG/ML
INJECTION INTRAMUSCULAR; INTRAVENOUS
Status: DISCONTINUED | OUTPATIENT
Start: 2019-11-29 | End: 2019-11-29

## 2019-11-29 RX ORDER — SODIUM CHLORIDE 9 MG/ML
INJECTION, SOLUTION INTRAVENOUS ONCE
Status: COMPLETED | OUTPATIENT
Start: 2019-11-29 | End: 2019-11-29

## 2019-11-29 RX ORDER — SODIUM CHLORIDE 0.9 % (FLUSH) 0.9 %
10 SYRINGE (ML) INJECTION
Status: DISCONTINUED | OUTPATIENT
Start: 2019-11-29 | End: 2019-11-29 | Stop reason: HOSPADM

## 2019-11-29 RX ORDER — PROPOFOL 10 MG/ML
INJECTION, EMULSION INTRAVENOUS CONTINUOUS PRN
Status: DISCONTINUED | OUTPATIENT
Start: 2019-11-29 | End: 2019-11-29

## 2019-11-29 RX ORDER — PROPOFOL 10 MG/ML
INJECTION, EMULSION INTRAVENOUS
Status: DISCONTINUED | OUTPATIENT
Start: 2019-11-29 | End: 2019-11-29

## 2019-11-29 RX ORDER — LIDOCAINE HCL/PF 100 MG/5ML
SYRINGE (ML) INTRAVENOUS
Status: DISCONTINUED | OUTPATIENT
Start: 2019-11-29 | End: 2019-11-29

## 2019-11-29 RX ORDER — LIDOCAINE HYDROCHLORIDE 20 MG/ML
SOLUTION OROPHARYNGEAL
Status: DISCONTINUED | OUTPATIENT
Start: 2019-11-29 | End: 2019-11-29

## 2019-11-29 RX ORDER — SODIUM CHLORIDE 0.9 % (FLUSH) 0.9 %
3 SYRINGE (ML) INJECTION
Status: DISCONTINUED | OUTPATIENT
Start: 2019-11-29 | End: 2019-11-29 | Stop reason: HOSPADM

## 2019-11-29 RX ORDER — SODIUM CHLORIDE 9 MG/ML
INJECTION, SOLUTION INTRAVENOUS CONTINUOUS PRN
Status: DISCONTINUED | OUTPATIENT
Start: 2019-11-29 | End: 2019-11-29

## 2019-11-29 RX ORDER — HYDROMORPHONE HYDROCHLORIDE 1 MG/ML
0.2 INJECTION, SOLUTION INTRAMUSCULAR; INTRAVENOUS; SUBCUTANEOUS EVERY 5 MIN PRN
Status: DISCONTINUED | OUTPATIENT
Start: 2019-11-29 | End: 2019-11-29 | Stop reason: HOSPADM

## 2019-11-29 RX ADMIN — GLYCOPYRROLATE 0.2 MG: 0.2 INJECTION, SOLUTION INTRAMUSCULAR; INTRAVENOUS at 10:11

## 2019-11-29 RX ADMIN — PROPOFOL 100 MG: 10 INJECTION, EMULSION INTRAVENOUS at 11:11

## 2019-11-29 RX ADMIN — LIDOCAINE HYDROCHLORIDE 15 ML: 20 SOLUTION OROPHARYNGEAL at 10:11

## 2019-11-29 RX ADMIN — LIDOCAINE HYDROCHLORIDE 100 MG: 20 INJECTION, SOLUTION INTRAVENOUS at 11:11

## 2019-11-29 RX ADMIN — SODIUM CHLORIDE 1000 ML: 0.9 INJECTION, SOLUTION INTRAVENOUS at 10:11

## 2019-11-29 RX ADMIN — PROPOFOL 150 MCG/KG/MIN: 10 INJECTION, EMULSION INTRAVENOUS at 11:11

## 2019-11-29 RX ADMIN — SODIUM CHLORIDE: 0.9 INJECTION, SOLUTION INTRAVENOUS at 10:11

## 2019-11-29 NOTE — NURSING
Pt d/c'd to home per md orders.  Vss.  Pt aao, tolerating po.  piv removed intact and without difficulty. Instructed pt and spouse on home medications, post procedure precautions and follow up visits.   Pt and spouse verbalizes understanding. Pt in no acute distress and verbalizes no complaints.

## 2019-11-29 NOTE — ANESTHESIA POSTPROCEDURE EVALUATION
Anesthesia Post Evaluation    Patient: Gt Banerjee    Procedure(s) Performed: Procedure(s) (LRB):  ECHOCARDIOGRAM,TRANSESOPHAGEAL (N/A)    Final Anesthesia Type: general    Patient location during evaluation: GI PACU  Patient participation: Yes- Able to Participate  Level of consciousness: awake and alert  Post-procedure vital signs: reviewed and stable  Pain management: adequate  Airway patency: patent    PONV status at discharge: No PONV  Anesthetic complications: no      Cardiovascular status: blood pressure returned to baseline  Respiratory status: spontaneous ventilation  Hydration status: euvolemic  Follow-up not needed.          Vitals Value Taken Time   /83 11/29/2019 12:17 PM   Temp 36.3 °C (97.3 °F) 11/29/2019 11:43 AM   Pulse 50 11/29/2019 12:23 PM   Resp 20 11/29/2019 12:23 PM   SpO2 98 % 11/29/2019 12:23 PM   Vitals shown include unvalidated device data.      No case tracking events are documented in the log.      Pain/Araceli Score: Araceli Score: 9 (11/29/2019 11:43 AM)

## 2019-11-29 NOTE — SUBJECTIVE & OBJECTIVE
No past medical history on file.    Past Surgical History:   Procedure Laterality Date    VASECTOMY         Review of patient's allergies indicates:  No Known Allergies    No current facility-administered medications on file prior to encounter.      Current Outpatient Medications on File Prior to Encounter   Medication Sig    aspirin (ECOTRIN) 81 MG EC tablet Take 81 mg by mouth once daily.    atorvastatin (LIPITOR) 20 MG tablet Take 1 tablet (20 mg total) by mouth once daily.    clopidogrel (PLAVIX) 75 mg tablet Take 1 tablet (75 mg total) by mouth once daily. (Patient not taking: Reported on 10/18/2019)    clopidogrel (PLAVIX) 75 mg tablet Take 1 tablet (75 mg total) by mouth once daily. Take 4 pills once and then start taking one pill a day.    tamsulosin (FLOMAX) 0.4 mg Cap Take 1 capsule (0.4 mg total) by mouth once daily.     Family History     Problem Relation (Age of Onset)    Hypertension Mother, Father    Stroke Mother        Tobacco Use    Smoking status: Never Smoker    Smokeless tobacco: Never Used   Substance and Sexual Activity    Alcohol use: Yes     Alcohol/week: 1.0 standard drinks     Types: 1 Shots of liquor per week     Frequency: Monthly or less     Comment: moderate    Drug use: Not on file    Sexual activity: Not on file     Review of Systems   Constitution: Negative for chills, decreased appetite, diaphoresis, fever, malaise/fatigue, night sweats, weight gain and weight loss.   Eyes: Negative.    Cardiovascular: Negative for chest pain, irregular heartbeat, leg swelling, near-syncope, orthopnea, palpitations, paroxysmal nocturnal dyspnea and syncope.   Respiratory: Negative for cough, shortness of breath, sputum production and wheezing.    Endocrine: Negative.    Hematologic/Lymphatic: Negative for bleeding problem.   Skin: Negative for color change, flushing, rash and suspicious lesions.   Musculoskeletal: Negative.    Gastrointestinal: Negative for bloating, abdominal pain,  change in bowel habit, constipation, diarrhea, heartburn, melena, nausea and vomiting.   Genitourinary: Negative for flank pain, frequency, hematuria, incomplete emptying and urgency.   Neurological: Negative for dizziness, focal weakness, headaches, light-headedness, numbness, paresthesias, seizures, sensory change and weakness.   Psychiatric/Behavioral: Negative for altered mental status. The patient is not nervous/anxious.      Objective:     Vital Signs (Most Recent):    Vital Signs (24h Range):           There is no height or weight on file to calculate BMI.            No intake or output data in the 24 hours ending 11/29/19 0933    Lines/Drains/Airways     None                 Physical Exam   Constitutional: He is oriented to person, place, and time. He appears well-developed and well-nourished. No distress.   HENT:   Head: Normocephalic and atraumatic.   Mouth/Throat: Oropharynx is clear and moist.   Eyes: Pupils are equal, round, and reactive to light. EOM are normal.   Neck: Normal range of motion. Neck supple. No JVD present.   Cardiovascular: Normal rate and regular rhythm. Exam reveals no gallop and no friction rub.   No murmur heard.  Pulmonary/Chest: Effort normal and breath sounds normal. No respiratory distress. He has no wheezes. He has no rales. He exhibits no tenderness.   Abdominal: Soft. Bowel sounds are normal. He exhibits no distension. There is no tenderness.   Musculoskeletal: Normal range of motion. He exhibits no edema.   Lymphadenopathy:     He has no cervical adenopathy.   Neurological: He is alert and oriented to person, place, and time.   Skin: Skin is warm and dry. No erythema.   Psychiatric: He has a normal mood and affect. His behavior is normal. Judgment and thought content normal.       Significant Labs: All pertinent lab results from the last 24 hours have been reviewed.    Significant Imaging: Reviewed in EPIC.

## 2019-11-29 NOTE — TRANSFER OF CARE
"Anesthesia Transfer of Care Note    Patient: Gt Banerjee    Procedure(s) Performed: Procedure(s) (LRB):  ECHOCARDIOGRAM,TRANSESOPHAGEAL (N/A)    Patient location: [facility]    Anesthesia Type: general    Transport from OR: Transported from OR on 2-3 L/min O2 by NC with adequate spontaneous ventilation    Post pain: adequate analgesia    Post assessment: no apparent anesthetic complications    Post vital signs: stable    Level of consciousness: awake, alert and oriented    Nausea/Vomiting: no nausea/vomiting    Complications: none    Transfer of care protocol was followed      Last vitals:   Visit Vitals  /70 (BP Location: Left arm, Patient Position: Lying)   Pulse (!) 59   Temp 36.1 °C (97 °F)   Resp 18   Ht 5' 10" (1.778 m)   Wt 81.6 kg (180 lb)   SpO2 (!) 95%   BMI 25.83 kg/m²     "

## 2019-11-29 NOTE — ASSESSMENT & PLAN NOTE
1. REDD for evaluation of ASA and PFO in anticipation for closure with Dr Gloria.   -No absolute contraindications of esophageal stricture, tumor, perforation, laceration,or diverticulum and/or active GI bleed  -The risks, benefits & alternatives of the procedure were explained to the patient.   -The risks of transesophageal echo include but are not limited to:  Dental trauma, esophageal trauma/perforation, bleeding, laryngospasm/brochospasm, aspiration, sore throat/hoarseness, & dislodgement of the endotracheal tube/nasogastric tube (where applicable).    -The risks of moderate sedation include hypotension, respiratory depression, arrhythmias, bronchospasm, & death.    -Informed consent was obtained. The patient is agreeable to proceed with the procedure and all questions and concerns addressed.    Case discussed with an attending in echocardiography lab.     Further recommendations per attending addendum

## 2019-11-29 NOTE — DISCHARGE SUMMARY
"  [facility]  Cardiology  Discharge Summary      Patient Name: Gt Banerjee  MRN: [id number]  Admission Date: 11/29/2019  Hospital Length of Stay: 0 days  Discharge Date and Time:  11/29/2019 11:32 AM  Attending Physician: Donnie Gloria MD    Discharging Provider: Ayana Rodriguez MD  Primary Care Physician: Primary Doctor No    HPI: Gt Banerjee is a 58 yo M with PMHx significant for cryptogenic pontine stroke in July 2019 with positive bubble study who presents for REDD to characterize atria lseptal  aneurysm and PFO per Dr Gloria's request prior to anticipated closure.      Dysphagia or odynophagia:  No  Liver Disease, esophageal disease, or known varices:  No  Upper GI Bleeding: No  Snoring:  Yes  Sleep Apnea:  No  Prior neck surgery or radiation:  No  History of anesthetic difficulties:  No  Family history of anesthetic difficulties:  No  Last oral intake:  12 hours ago  Able to move neck in all directions:  Yes       TTE 9/23/2019:   " Moderate left atrial enlargement.   " Normal left ventricular systolic function. The estimated ejection fraction is 55%   " No wall motion abnormalities.   " Indeterminate left ventricular diastolic function.   " Mild right ventricular enlargement.   " Normal right ventricular systolic function.   " Mild tricuspid regurgitation.   " The estimated PA systolic pressure is 16 mm Hg   " Normal central venous pressure (3 mm Hg).   " Presence of atrial septal aneurysm. Positive bubble study with more then 20 bubbles seen on the left side within the second beat with and without valsalva consistent with a large PFO.           Procedure(s) (LRB):  ECHOCARDIOGRAM,TRANSESOPHAGEAL (N/A)     Indwelling Lines/Drains at time of discharge:  Lines/Drains/Airways     None                 Hospital Course: Patient presented for outpatient REDD to characterize PFO and atrial septal aneurysm. REDD completed without issue. Refer to Epic for final report details. " Patient discharged home same day with ongoing follow up with [facility] / Dr Gloria to discuss PFO closure.     Significant Diagnostic Studies: Labs:   BMP: No results for input(s): GLU, NA, K, CL, CO2, BUN, CREATININE, CALCIUM, MG in the last 48 hours., CMP No results for input(s): NA, K, CL, CO2, GLU, BUN, CREATININE, CALCIUM, PROT, ALBUMIN, BILITOT, ALKPHOS, AST, ALT, ANIONGAP, ESTGFRAFRICA, EGFRNONAA in the last 48 hours., CBC No results for input(s): WBC, HGB, HCT, PLT in the last 48 hours. and INR   Lab Results   Component Value Date    INR 1.0 11/25/2019    INR 1.0 07/12/2011       Pending Diagnostic Studies:     Procedure Component Value Units Date/Time    Basic metabolic panel [[id number]] Collected:  11/29/19 0933    Order Status:  Sent Lab Status:  In process Updated:  11/29/19 0933    Specimen:  Blood     Narrative:       Collection has been rescheduled by 3 at 11/29/2019 09:33 Reason:   unit collect    CBC Without Differential [[id number]] Collected:  11/29/19 0933    Order Status:  Sent Lab Status:  In process Updated:  11/29/19 0933    Specimen:  Blood     Narrative:       Collection has been rescheduled by 3 at 11/29/2019 09:33 Reason:   unit collect    Protime-INR [[id number]] Collected:  11/29/19 0933    Order Status:  Sent Lab Status:  In process Updated:  11/29/19 0933    Specimen:  Blood     Narrative:       Collection has been rescheduled by 3 at 11/29/2019 09:33 Reason:   unit collect    Transesophageal echo (REDD) [[id number]]     Order Status:  Sent Lab Status:  No result           Final Active Diagnoses:    Diagnosis Date Noted POA    PFO (patent foramen ovale) [Q21.1] 11/25/2019 Not Applicable      Problems Resolved During this Admission:     No new Assessment & Plan notes have been filed under this hospital service since the last note was generated.  Service: Cardiology      Discharged Condition: good    Disposition: Home or Self Care    Follow Up:    Patient Instructions:   No  discharge procedures on file.  Medications:  Reconciled Home Medications:      Medication List      CONTINUE taking these medications    aspirin 81 MG EC tablet  Commonly known as:  ECOTRIN  Take 81 mg by mouth once daily.     atorvastatin 20 MG tablet  Commonly known as:  LIPITOR  Take 1 tablet (20 mg total) by mouth once daily.     * clopidogrel 75 mg tablet  Commonly known as:  PLAVIX  Take 1 tablet (75 mg total) by mouth once daily.     * clopidogrel 75 mg tablet  Commonly known as:  PLAVIX  Take 1 tablet (75 mg total) by mouth once daily. Take 4 pills once and then start taking one pill a day.     tamsulosin 0.4 mg Cap  Commonly known as:  FLOMAX  Take 1 capsule (0.4 mg total) by mouth once daily.         * This list has 2 medication(s) that are the same as other medications prescribed for you. Read the directions carefully, and ask your doctor or other care provider to review them with you.                Time spent on the discharge of patient: 35 minutes    Ayana Rodriguez MD  Cardiology  [facility]

## 2019-11-29 NOTE — H&P
"[facility]  Cardiology  History and Physical     Patient Name: Gt Banerjee  MRN: [id number]  Admission Date: 11/29/2019  Code Status: Full Code   Attending Provider: Donnie Gloria MD   Primary Care Physician: Primary Doctor No  Principal Problem:<principal problem not specified>    Patient information was obtained from patient and ER records.     Subjective:       HPI: Gt Banerjee is a 60 yo M with PMHx significant for cryptogenic pontine stroke in July 2019 with positive bubble study who presents for REDD to characterize atria lseptal  aneurysm and PFO per Dr Gloria's request prior to anticipated closure.     Dysphagia or odynophagia:  No  Liver Disease, esophageal disease, or known varices:  No  Upper GI Bleeding: No  Snoring:  Yes  Sleep Apnea:  No  Prior neck surgery or radiation:  No  History of anesthetic difficulties:  No  Family history of anesthetic difficulties:  No  Last oral intake:  12 hours ago  Able to move neck in all directions:  Yes      TTE 9/23/2019:   " Moderate left atrial enlargement.   " Normal left ventricular systolic function. The estimated ejection fraction is 55%   " No wall motion abnormalities.   " Indeterminate left ventricular diastolic function.   " Mild right ventricular enlargement.   " Normal right ventricular systolic function.   " Mild tricuspid regurgitation.   " The estimated PA systolic pressure is 16 mm Hg   " Normal central venous pressure (3 mm Hg).   " Presence of atrial septal aneurysm. Positive bubble study with more then 20 bubbles seen on the left side within the second beat with and without valsalva consistent with a large PFO.     No past medical history on file.    Past Surgical History:   Procedure Laterality Date    VASECTOMY         Review of patient's allergies indicates:  No Known Allergies    No current facility-administered medications on file prior to encounter.      Current Outpatient Medications on File Prior to " Encounter   Medication Sig    aspirin (ECOTRIN) 81 MG EC tablet Take 81 mg by mouth once daily.    atorvastatin (LIPITOR) 20 MG tablet Take 1 tablet (20 mg total) by mouth once daily.    clopidogrel (PLAVIX) 75 mg tablet Take 1 tablet (75 mg total) by mouth once daily. (Patient not taking: Reported on 10/18/2019)    clopidogrel (PLAVIX) 75 mg tablet Take 1 tablet (75 mg total) by mouth once daily. Take 4 pills once and then start taking one pill a day.    tamsulosin (FLOMAX) 0.4 mg Cap Take 1 capsule (0.4 mg total) by mouth once daily.     Family History     Problem Relation (Age of Onset)    Hypertension Mother, Father    Stroke Mother        Tobacco Use    Smoking status: Never Smoker    Smokeless tobacco: Never Used   Substance and Sexual Activity    Alcohol use: Yes     Alcohol/week: 1.0 standard drinks     Types: 1 Shots of liquor per week     Frequency: Monthly or less     Comment: moderate    Drug use: Not on file    Sexual activity: Not on file     Review of Systems   Constitution: Negative for chills, decreased appetite, diaphoresis, fever, malaise/fatigue, night sweats, weight gain and weight loss.   Eyes: Negative.    Cardiovascular: Negative for chest pain, irregular heartbeat, leg swelling, near-syncope, orthopnea, palpitations, paroxysmal nocturnal dyspnea and syncope.   Respiratory: Negative for cough, shortness of breath, sputum production and wheezing.    Endocrine: Negative.    Hematologic/Lymphatic: Negative for bleeding problem.   Skin: Negative for color change, flushing, rash and suspicious lesions.   Musculoskeletal: Negative.    Gastrointestinal: Negative for bloating, abdominal pain, change in bowel habit, constipation, diarrhea, heartburn, melena, nausea and vomiting.   Genitourinary: Negative for flank pain, frequency, hematuria, incomplete emptying and urgency.   Neurological: Negative for dizziness, focal weakness, headaches, light-headedness, numbness, paresthesias, seizures,  sensory change and weakness.   Psychiatric/Behavioral: Negative for altered mental status. The patient is not nervous/anxious.      Objective:     Vital Signs (Most Recent):    Vital Signs (24h Range):           There is no height or weight on file to calculate BMI.            No intake or output data in the 24 hours ending 11/29/19 0933    Lines/Drains/Airways     None                 Physical Exam   Constitutional: He is oriented to person, place, and time. He appears well-developed and well-nourished. No distress.   HENT:   Head: Normocephalic and atraumatic.   Mouth/Throat: Oropharynx is clear and moist.   Eyes: Pupils are equal, round, and reactive to light. EOM are normal.   Neck: Normal range of motion. Neck supple. No JVD present.   Cardiovascular: Normal rate and regular rhythm. Exam reveals no gallop and no friction rub.   No murmur heard.  Pulmonary/Chest: Effort normal and breath sounds normal. No respiratory distress. He has no wheezes. He has no rales. He exhibits no tenderness.   Abdominal: Soft. Bowel sounds are normal. He exhibits no distension. There is no tenderness.   Musculoskeletal: Normal range of motion. He exhibits no edema.   Lymphadenopathy:     He has no cervical adenopathy.   Neurological: He is alert and oriented to person, place, and time.   Skin: Skin is warm and dry. No erythema.   Psychiatric: He has a normal mood and affect. His behavior is normal. Judgment and thought content normal.       Significant Labs: All pertinent lab results from the last 24 hours have been reviewed.    Significant Imaging: Reviewed in EPIC.     Assessment and Plan:     PFO (patent foramen ovale)  1. REDD for evaluation of ASA and PFO in anticipation for closure with Dr Gloria.   -No absolute contraindications of esophageal stricture, tumor, perforation, laceration,or diverticulum and/or active GI bleed  -The risks, benefits & alternatives of the procedure were explained to the patient.   -The risks of  transesophageal echo include but are not limited to:  Dental trauma, esophageal trauma/perforation, bleeding, laryngospasm/brochospasm, aspiration, sore throat/hoarseness, & dislodgement of the endotracheal tube/nasogastric tube (where applicable).    -The risks of moderate sedation include hypotension, respiratory depression, arrhythmias, bronchospasm, & death.    -Informed consent was obtained. The patient is agreeable to proceed with the procedure and all questions and concerns addressed.    Case discussed with an attending in echocardiography lab.     Further recommendations per attending addendum          Ayana Rodriguez MD  Cardiology   [facility]

## 2019-11-29 NOTE — HPI
"Gt Banerjee is a 60 yo M with PMHx significant for cryptogenic pontine stroke in July 2019 with positive bubble study who presents for REDD to characterize septal atrial aneurysm and PFO per Dr Gloria's request prior to anticipated closure.     TTE 9/23/2019:   " Moderate left atrial enlargement.   " Normal left ventricular systolic function. The estimated ejection fraction is 55%   " No wall motion abnormalities.   " Indeterminate left ventricular diastolic function.   " Mild right ventricular enlargement.   " Normal right ventricular systolic function.   " Mild tricuspid regurgitation.   " The estimated PA systolic pressure is 16 mm Hg   " Normal central venous pressure (3 mm Hg).   " Presence of atrial septal aneurysm. Positive bubble study with more then 20 bubbles seen on the left side within the second beat with and without valsalva consistent with a large PFO.   "

## 2019-11-29 NOTE — PLAN OF CARE
Pt transferred from recovery via stretcher.  Vss.  Post op orders and assessment initiated.  Pt aao, tolerating po.  Family called to bs.  Pt in no acute distress and verbalizes no complaints. Call bell within reach.  Will continue to monitor.

## 2019-11-29 NOTE — PLAN OF CARE
Pt arrived to unit accompanied by spouse.  Vss.  Oriented pt to rm and unit.  Pre op orders and assessment initiated.  Pt remains npo.  Pt in no acute distress and verbalizes no complaints.  Will continue to monitor. Call bell within reach.

## 2019-11-29 NOTE — NURSING
Discharge instructions and medlist given.  Patient and spouse verbalized understanding.  Denies need for escort or wheelchair off unit.  Off unit walking with spouse who will drive him home

## 2019-11-29 NOTE — ANESTHESIA PREPROCEDURE EVALUATION
11/29/2019  Gt Banerjee is a 59 y.o., male with hx of stroke scheduled for REDD to eval for PFO.  Other pmhx includes HTN and bradycardia    Past Medical History:   Diagnosis Date    Stroke      Lab Results   Component Value Date    WBC 4.49 11/25/2019    HGB 15.7 11/25/2019    HCT 46.5 11/25/2019    MCV 97 11/25/2019     11/25/2019         Chemistry        Component Value Date/Time     11/25/2019 1057    K 4.3 11/25/2019 1057     11/25/2019 1057    CO2 28 11/25/2019 1057    BUN 22 11/25/2019 1057    CREATININE 0.9 11/25/2019 1057    GLU 92 11/25/2019 1057        Component Value Date/Time    CALCIUM 9.3 11/25/2019 1057    ALKPHOS 54 (L) 07/12/2011 1403    AST 39 07/12/2011 1403    ALT 27 07/12/2011 1403    BILITOT 0.4 07/12/2011 1403    ESTGFRAFRICA >60.0 11/25/2019 1057    EGFRNONAA >60.0 11/25/2019 1057            Anesthesia Evaluation    I have reviewed the Patient Summary Reports.     I have reviewed the Medications.     Review of Systems         Anesthesia Plan  Type of Anesthesia, risks & benefits discussed:  Anesthesia Type:  general  Patient's Preference:   Intra-op Monitoring Plan: standard ASA monitors  Intra-op Monitoring Plan Comments:   Post Op Pain Control Plan: multimodal analgesia  Post Op Pain Control Plan Comments:   Induction:   IV  Beta Blocker:         Informed Consent: Patient understands risks and agrees with Anesthesia plan.  Questions answered. Anesthesia consent signed with patient.  ASA Score: 2     Day of Surgery Review of History & Physical:    H&P update referred to the surgeon.         Ready For Surgery From Anesthesia Perspective.

## 2019-12-02 ENCOUNTER — EDUCATION (OUTPATIENT)
Dept: CARDIOLOGY | Facility: CLINIC | Age: 59
End: 2019-12-02

## 2019-12-02 ENCOUNTER — PATIENT MESSAGE (OUTPATIENT)
Dept: MEDSURG UNIT | Facility: HOSPITAL | Age: 59
End: 2019-12-02

## 2019-12-08 ENCOUNTER — PATIENT MESSAGE (OUTPATIENT)
Dept: MEDSURG UNIT | Facility: HOSPITAL | Age: 59
End: 2019-12-08

## 2019-12-11 ENCOUNTER — HOSPITAL ENCOUNTER (OUTPATIENT)
Facility: HOSPITAL | Age: 59
Discharge: STILL ADMITTED OR ADMINISTRATIVE | End: 2019-12-11
Attending: INTERNAL MEDICINE | Admitting: INTERNAL MEDICINE
Payer: COMMERCIAL

## 2019-12-11 VITALS
DIASTOLIC BLOOD PRESSURE: 81 MMHG | HEIGHT: 70 IN | HEART RATE: 60 BPM | BODY MASS INDEX: 25.77 KG/M2 | TEMPERATURE: 98 F | SYSTOLIC BLOOD PRESSURE: 130 MMHG | WEIGHT: 180 LBS | OXYGEN SATURATION: 99 % | RESPIRATION RATE: 20 BRPM

## 2019-12-11 DIAGNOSIS — I63.9 CRYPTOGENIC STROKE: ICD-10-CM

## 2019-12-11 DIAGNOSIS — Z86.73 HISTORY OF ISCHEMIC VERTEBROBASILAR ARTERY BRAINSTEM STROKE: ICD-10-CM

## 2019-12-11 DIAGNOSIS — I25.3 ATRIAL SEPTAL ANEURYSM: ICD-10-CM

## 2019-12-11 DIAGNOSIS — Q21.12 PFO (PATENT FORAMEN OVALE): ICD-10-CM

## 2019-12-11 LAB
ABO + RH BLD: NORMAL
ANION GAP SERPL CALC-SCNC: 7 MMOL/L (ref 8–16)
APTT BLDCRRT: 25.1 SEC (ref 21–32)
BASOPHILS # BLD AUTO: 0.03 K/UL (ref 0–0.2)
BASOPHILS NFR BLD: 0.8 % (ref 0–1.9)
BLD GP AB SCN CELLS X3 SERPL QL: NORMAL
BUN SERPL-MCNC: 20 MG/DL (ref 8–23)
CALCIUM SERPL-MCNC: 9.1 MG/DL (ref 8.7–10.5)
CHLORIDE SERPL-SCNC: 108 MMOL/L (ref 95–110)
CO2 SERPL-SCNC: 25 MMOL/L (ref 23–29)
CREAT SERPL-MCNC: 0.9 MG/DL (ref 0.5–1.4)
DIFFERENTIAL METHOD: ABNORMAL
EOSINOPHIL # BLD AUTO: 0.1 K/UL (ref 0–0.5)
EOSINOPHIL NFR BLD: 1.8 % (ref 0–8)
ERYTHROCYTE [DISTWIDTH] IN BLOOD BY AUTOMATED COUNT: 11.4 % (ref 11.5–14.5)
EST. GFR  (AFRICAN AMERICAN): >60 ML/MIN/1.73 M^2
EST. GFR  (NON AFRICAN AMERICAN): >60 ML/MIN/1.73 M^2
GLUCOSE SERPL-MCNC: 92 MG/DL (ref 70–110)
HCT VFR BLD AUTO: 45 % (ref 40–54)
HGB BLD-MCNC: 15.8 G/DL (ref 14–18)
IMM GRANULOCYTES # BLD AUTO: 0.01 K/UL (ref 0–0.04)
IMM GRANULOCYTES NFR BLD AUTO: 0.3 % (ref 0–0.5)
INR PPP: 1 (ref 0.8–1.2)
LYMPHOCYTES # BLD AUTO: 1 K/UL (ref 1–4.8)
LYMPHOCYTES NFR BLD: 27.3 % (ref 18–48)
MCH RBC QN AUTO: 33.6 PG (ref 27–31)
MCHC RBC AUTO-ENTMCNC: 35.1 G/DL (ref 32–36)
MCV RBC AUTO: 96 FL (ref 82–98)
MONOCYTES # BLD AUTO: 0.3 K/UL (ref 0.3–1)
MONOCYTES NFR BLD: 8.7 % (ref 4–15)
NEUTROPHILS # BLD AUTO: 2.3 K/UL (ref 1.8–7.7)
NEUTROPHILS NFR BLD: 61.1 % (ref 38–73)
NRBC BLD-RTO: 0 /100 WBC
PLATELET # BLD AUTO: 201 K/UL (ref 150–350)
PMV BLD AUTO: 10.1 FL (ref 9.2–12.9)
POTASSIUM SERPL-SCNC: 4.5 MMOL/L (ref 3.5–5.1)
PROTHROMBIN TIME: 10.6 SEC (ref 9–12.5)
RBC # BLD AUTO: 4.7 M/UL (ref 4.6–6.2)
SODIUM SERPL-SCNC: 140 MMOL/L (ref 136–145)
WBC # BLD AUTO: 3.81 K/UL (ref 3.9–12.7)

## 2019-12-11 PROCEDURE — 93580 TRANSCATH CLOSURE OF ASD: CPT | Performed by: INTERNAL MEDICINE

## 2019-12-11 PROCEDURE — C1769 GUIDE WIRE: HCPCS | Performed by: INTERNAL MEDICINE

## 2019-12-11 PROCEDURE — 93010 EKG 12-LEAD: ICD-10-PCS | Mod: ,,, | Performed by: INTERNAL MEDICINE

## 2019-12-11 PROCEDURE — 99152 MOD SED SAME PHYS/QHP 5/>YRS: CPT | Mod: ,,, | Performed by: INTERNAL MEDICINE

## 2019-12-11 PROCEDURE — 86901 BLOOD TYPING SEROLOGIC RH(D): CPT

## 2019-12-11 PROCEDURE — 25000003 PHARM REV CODE 250: Performed by: INTERNAL MEDICINE

## 2019-12-11 PROCEDURE — 99152 MOD SED SAME PHYS/QHP 5/>YRS: CPT | Performed by: INTERNAL MEDICINE

## 2019-12-11 PROCEDURE — 99152 PR MOD CONSCIOUS SEDATION, SAME PHYS, 5+ YRS, FIRST 15 MIN: ICD-10-PCS | Mod: ,,, | Performed by: INTERNAL MEDICINE

## 2019-12-11 PROCEDURE — 93662 INTRACARDIAC ECG (ICE): CPT | Performed by: INTERNAL MEDICINE

## 2019-12-11 PROCEDURE — 25000003 PHARM REV CODE 250: Performed by: STUDENT IN AN ORGANIZED HEALTH CARE EDUCATION/TRAINING PROGRAM

## 2019-12-11 PROCEDURE — 93580 PR PERC CLOS,CONG INTERATRIAL COMMUN W/IMPL: ICD-10-PCS | Mod: ,,, | Performed by: INTERNAL MEDICINE

## 2019-12-11 PROCEDURE — 85730 THROMBOPLASTIN TIME PARTIAL: CPT

## 2019-12-11 PROCEDURE — C1753 CATH, INTRAVAS ULTRASOUND: HCPCS | Performed by: INTERNAL MEDICINE

## 2019-12-11 PROCEDURE — 93010 ELECTROCARDIOGRAM REPORT: CPT | Mod: ,,, | Performed by: INTERNAL MEDICINE

## 2019-12-11 PROCEDURE — 36415 COLL VENOUS BLD VENIPUNCTURE: CPT

## 2019-12-11 PROCEDURE — 93662 INTRACARDIAC ECG (ICE): CPT | Mod: 26,,, | Performed by: INTERNAL MEDICINE

## 2019-12-11 PROCEDURE — C1817 SEPTAL DEFECT IMP SYS: HCPCS | Performed by: INTERNAL MEDICINE

## 2019-12-11 PROCEDURE — 99153 MOD SED SAME PHYS/QHP EA: CPT | Performed by: INTERNAL MEDICINE

## 2019-12-11 PROCEDURE — 63600175 PHARM REV CODE 636 W HCPCS: Performed by: INTERNAL MEDICINE

## 2019-12-11 PROCEDURE — 93005 ELECTROCARDIOGRAM TRACING: CPT | Mod: 59

## 2019-12-11 PROCEDURE — 27201423 OPTIME MED/SURG SUP & DEVICES STERILE SUPPLY: Performed by: INTERNAL MEDICINE

## 2019-12-11 PROCEDURE — C1894 INTRO/SHEATH, NON-LASER: HCPCS | Performed by: INTERNAL MEDICINE

## 2019-12-11 PROCEDURE — 85025 COMPLETE CBC W/AUTO DIFF WBC: CPT

## 2019-12-11 PROCEDURE — 80048 BASIC METABOLIC PNL TOTAL CA: CPT

## 2019-12-11 PROCEDURE — 93662 PR INTRACARD ECHO, THER/DX INTERVENT: ICD-10-PCS | Mod: 26,,, | Performed by: INTERNAL MEDICINE

## 2019-12-11 PROCEDURE — 93580 TRANSCATH CLOSURE OF ASD: CPT | Mod: ,,, | Performed by: INTERNAL MEDICINE

## 2019-12-11 PROCEDURE — 85610 PROTHROMBIN TIME: CPT

## 2019-12-11 DEVICE — IMPLANTABLE DEVICE
Type: IMPLANTABLE DEVICE | Site: HEART | Status: FUNCTIONAL
Brand: AMPLATZER™

## 2019-12-11 RX ORDER — HEPARIN SODIUM 1000 [USP'U]/ML
INJECTION, SOLUTION INTRAVENOUS; SUBCUTANEOUS
Status: DISCONTINUED | OUTPATIENT
Start: 2019-12-11 | End: 2019-12-11 | Stop reason: HOSPADM

## 2019-12-11 RX ORDER — MIDAZOLAM HYDROCHLORIDE 1 MG/ML
INJECTION, SOLUTION INTRAMUSCULAR; INTRAVENOUS
Status: DISCONTINUED | OUTPATIENT
Start: 2019-12-11 | End: 2019-12-11 | Stop reason: HOSPADM

## 2019-12-11 RX ORDER — SODIUM CHLORIDE 9 MG/ML
INJECTION, SOLUTION INTRAVENOUS
Status: DISCONTINUED | OUTPATIENT
Start: 2019-12-11 | End: 2019-12-11 | Stop reason: HOSPADM

## 2019-12-11 RX ORDER — CLOPIDOGREL BISULFATE 75 MG/1
75 TABLET ORAL DAILY
Qty: 30 TABLET | Refills: 5 | Status: SHIPPED | OUTPATIENT
Start: 2019-12-11 | End: 2020-06-08

## 2019-12-11 RX ORDER — FENTANYL CITRATE 50 UG/ML
INJECTION, SOLUTION INTRAMUSCULAR; INTRAVENOUS
Status: DISCONTINUED | OUTPATIENT
Start: 2019-12-11 | End: 2019-12-11 | Stop reason: HOSPADM

## 2019-12-11 RX ORDER — DIPHENHYDRAMINE HCL 50 MG
50 CAPSULE ORAL ONCE
Status: COMPLETED | OUTPATIENT
Start: 2019-12-11 | End: 2019-12-11

## 2019-12-11 RX ORDER — CEFAZOLIN SODIUM 1 G/3ML
INJECTION, POWDER, FOR SOLUTION INTRAMUSCULAR; INTRAVENOUS
Status: DISCONTINUED | OUTPATIENT
Start: 2019-12-11 | End: 2019-12-11 | Stop reason: HOSPADM

## 2019-12-11 RX ORDER — LIDOCAINE HYDROCHLORIDE 20 MG/ML
INJECTION, SOLUTION INFILTRATION; PERINEURAL
Status: DISCONTINUED | OUTPATIENT
Start: 2019-12-11 | End: 2019-12-11 | Stop reason: HOSPADM

## 2019-12-11 RX ORDER — SODIUM CHLORIDE 9 MG/ML
INJECTION, SOLUTION INTRAVENOUS CONTINUOUS
Status: ACTIVE | OUTPATIENT
Start: 2019-12-11 | End: 2019-12-11

## 2019-12-11 RX ORDER — HEPARIN SODIUM 200 [USP'U]/100ML
INJECTION, SOLUTION INTRAVENOUS
Status: DISCONTINUED | OUTPATIENT
Start: 2019-12-11 | End: 2019-12-11 | Stop reason: HOSPADM

## 2019-12-11 RX ADMIN — DIPHENHYDRAMINE HYDROCHLORIDE 50 MG: 50 CAPSULE ORAL at 08:12
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
## 2019-12-11 NOTE — DISCHARGE SUMMARY
[facility] - Short Stay Cardiac Unit  Discharge Summary      Admit Date: 12/11/2019    Discharge Date and Time:  12/11/2019 12:51 PM    Attending Physician: Donnie Gloria MD     Reason for Admission: PFO closure    Procedures Performed: Procedure(s) (LRB):  Closure, PFO (N/A)    Hospital Course (synopsis of major diagnoses, care, treatment, and services provided during the course of the hospital stay):   Mr Banerjee was admitted for percutaneous PFO closure.   HE tolerated it well. There were no complications.   He will follow up with us in 6 months with 2D echo with bubble study.   He will do 6 months of DAPT (ASA and Plavix).  Abx prophylaxis for life for SBPE.     Consults: none    Significant Diagnostic Studies: Labs: All labs within the past 24 hours have been reviewed    Final Diagnoses:    Principal Problem: PFO (patent foramen ovale)   Secondary Diagnoses:   Active Hospital Problems    Diagnosis  POA    *PFO (patent foramen ovale) [Q21.1]  Not Applicable    History of ischemic vertebrobasilar artery brainstem stroke [Z86.73]  Not Applicable      Resolved Hospital Problems   No resolved problems to display.       Discharged Condition: good    Disposition: Home or Self Care    Follow Up/Patient Instructions:     Medications:  Reconciled Home Medications:      Medication List      CHANGE how you take these medications    * clopidogrel 75 mg tablet  Commonly known as:  PLAVIX  Take 1 tablet (75 mg total) by mouth once daily.  What changed:  Another medication with the same name was added. Make sure you understand how and when to take each.     * clopidogrel 75 mg tablet  Commonly known as:  PLAVIX  Take 1 tablet (75 mg total) by mouth once daily. Take 4 pills once and then start taking one pill a day.  What changed:  Another medication with the same name was added. Make sure you understand how and when to take each.     * clopidogrel 75 mg tablet  Commonly known as:  PLAVIX  Take 1 tablet (75 mg  total) by mouth once daily.  What changed:  You were already taking a medication with the same name, and this prescription was added. Make sure you understand how and when to take each.         * This list has 3 medication(s) that are the same as other medications prescribed for you. Read the directions carefully, and ask your doctor or other care provider to review them with you.            CONTINUE taking these medications    aspirin 81 MG EC tablet  Commonly known as:  ECOTRIN  Take 81 mg by mouth once daily.     atorvastatin 20 MG tablet  Commonly known as:  LIPITOR  Take 1 tablet (20 mg total) by mouth once daily.     tamsulosin 0.4 mg Cap  Commonly known as:  FLOMAX  Take 1 capsule (0.4 mg total) by mouth once daily.          Discharge Procedure Orders   Echo Color Flow Doppler? Yes   Standing Status: Future Standing Exp. Date: 12/11/20   Order Comments: With bubble study; s/p PFO closure     Order Specific Question Answer Comments   Color Flow Doppler? Yes      Follow-up Information     Donnie Gloria MD In 6 months.    Specialty:  INTERVENTIONAL CARDIOLOGY  Contact information:  [address]  [facility] [id number] [phone]

## 2019-12-11 NOTE — PLAN OF CARE
Received report from JULES Molina. Patient s/p PFO, AAOx3. VSS, no c/o pain or discomfort at this time, resp even and unlabored. Gauze/tegaderm dressing to R groin is CDI. No active bleeding. No hematoma noted. Post procedure protocol reviewed with patient and patient's family. Understanding verbalized. Family members at bedside. Nurse call bell within reach. Will continue to monitor per post procedure protocol.

## 2019-12-11 NOTE — INTERVAL H&P NOTE
The patient has been examined and the H&P has been reviewed:    I concur with the findings and no changes have occurred since H&P was written.    Anesthesia/Surgery risks, benefits and alternative options discussed and understood by patient/family.          Active Hospital Problems    Diagnosis  POA    History of ischemic vertebrobasilar artery brainstem stroke [Z86.73]  Not Applicable      Resolved Hospital Problems   No resolved problems to display.

## 2019-12-29 ENCOUNTER — PATIENT MESSAGE (OUTPATIENT)
Dept: CARDIOLOGY | Facility: CLINIC | Age: 59
End: 2019-12-29

## 2020-02-11 DIAGNOSIS — R30.0 DYSURIA: ICD-10-CM

## 2020-02-12 RX ORDER — TAMSULOSIN HYDROCHLORIDE 0.4 MG/1
CAPSULE ORAL
Qty: 30 CAPSULE | Refills: 2 | Status: SHIPPED | OUTPATIENT
Start: 2020-02-12 | End: 2020-06-12

## 2020-04-20 ENCOUNTER — PATIENT MESSAGE (OUTPATIENT)
Dept: CARDIOLOGY | Facility: CLINIC | Age: 60
End: 2020-04-20

## 2020-04-20 DIAGNOSIS — Q21.12 PFO (PATENT FORAMEN OVALE): Primary | ICD-10-CM

## 2020-04-26 ENCOUNTER — PATIENT MESSAGE (OUTPATIENT)
Dept: CARDIOLOGY | Facility: CLINIC | Age: 60
End: 2020-04-26

## 2020-04-27 DIAGNOSIS — R42 ORTHOSTATIC LIGHTHEADEDNESS: ICD-10-CM

## 2020-04-27 DIAGNOSIS — R00.1 BRADYCARDIA: Primary | ICD-10-CM

## 2020-04-29 ENCOUNTER — CLINICAL SUPPORT (OUTPATIENT)
Dept: CARDIOLOGY | Facility: CLINIC | Age: 60
End: 2020-04-29
Attending: INTERNAL MEDICINE
Payer: COMMERCIAL

## 2020-04-29 DIAGNOSIS — R00.1 BRADYCARDIA: ICD-10-CM

## 2020-04-29 DIAGNOSIS — R42 ORTHOSTATIC LIGHTHEADEDNESS: ICD-10-CM

## 2020-04-29 PROCEDURE — 93224 XTRNL ECG REC UP TO 48 HRS: CPT | Mod: S$GLB,,, | Performed by: INTERNAL MEDICINE

## 2020-04-29 PROCEDURE — 93224 HOLTER MONITOR - 48 HOUR (CUPID ONLY): ICD-10-PCS | Mod: S$GLB,,, | Performed by: INTERNAL MEDICINE

## 2020-05-01 LAB
OHS CV EVENT MONITOR DAY: 0
OHS CV HOLTER LENGTH DECIMAL HOURS: 48
OHS CV HOLTER LENGTH HOURS: 48
OHS CV HOLTER LENGTH MINUTES: 0

## 2020-06-01 ENCOUNTER — OFFICE VISIT (OUTPATIENT)
Dept: CARDIOLOGY | Facility: CLINIC | Age: 60
End: 2020-06-01
Payer: COMMERCIAL

## 2020-06-01 ENCOUNTER — HOSPITAL ENCOUNTER (OUTPATIENT)
Dept: CARDIOLOGY | Facility: CLINIC | Age: 60
Discharge: HOME | End: 2020-06-01
Attending: INTERNAL MEDICINE
Payer: COMMERCIAL

## 2020-06-01 VITALS
DIASTOLIC BLOOD PRESSURE: 84 MMHG | SYSTOLIC BLOOD PRESSURE: 132 MMHG | WEIGHT: 182.56 LBS | HEART RATE: 47 BPM | OXYGEN SATURATION: 100 % | HEIGHT: 70 IN | BODY MASS INDEX: 26.14 KG/M2

## 2020-06-01 VITALS
HEIGHT: 70 IN | SYSTOLIC BLOOD PRESSURE: 120 MMHG | WEIGHT: 178 LBS | HEART RATE: 48 BPM | DIASTOLIC BLOOD PRESSURE: 78 MMHG | BODY MASS INDEX: 25.48 KG/M2

## 2020-06-01 DIAGNOSIS — Q21.12 PFO (PATENT FORAMEN OVALE): ICD-10-CM

## 2020-06-01 DIAGNOSIS — Z86.73 HISTORY OF ISCHEMIC VERTEBROBASILAR ARTERY BRAINSTEM STROKE: ICD-10-CM

## 2020-06-01 DIAGNOSIS — Z87.74 S/P PATENT FORAMEN OVALE CLOSURE: ICD-10-CM

## 2020-06-01 LAB
ASCENDING AORTA: 4.4 CM
AV INDEX (PROSTH): 1.01
AV MEAN GRADIENT: 3 MMHG
AV PEAK GRADIENT: 6 MMHG
AV VALVE AREA: 3.89 CM2
AV VELOCITY RATIO: 0.93
BSA FOR ECHO PROCEDURE: 2 M2
CV ECHO LV RWT: 0.24 CM
DOP CALC AO PEAK VEL: 1.27 M/S
DOP CALC AO VTI: 25.13 CM
DOP CALC LVOT AREA: 3.8 CM2
DOP CALC LVOT DIAMETER: 2.21 CM
DOP CALC LVOT PEAK VEL: 1.18 M/S
DOP CALC LVOT STROKE VOLUME: 97.77 CM3
DOP CALCLVOT PEAK VEL VTI: 25.5 CM
E WAVE DECELERATION TIME: 240.65 MSEC
E/A RATIO: 1.07
E/E' RATIO: 3.68 M/S
ECHO LV POSTERIOR WALL: 0.63 CM (ref 0.6–1.1)
FRACTIONAL SHORTENING: 36 % (ref 28–44)
INTERVENTRICULAR SEPTUM: 0.65 CM (ref 0.6–1.1)
IVRT: 102.76 MSEC
LA MAJOR: 5.55 CM
LA MINOR: 5.66 CM
LA WIDTH: 4.62 CM
LEFT ATRIUM SIZE: 3.51 CM
LEFT ATRIUM VOLUME INDEX: 38.9 ML/M2
LEFT ATRIUM VOLUME: 77.25 CM3
LEFT INTERNAL DIMENSION IN SYSTOLE: 3.38 CM (ref 2.1–4)
LEFT VENTRICLE DIASTOLIC VOLUME INDEX: 67.04 ML/M2
LEFT VENTRICLE DIASTOLIC VOLUME: 133.18 ML
LEFT VENTRICLE MASS INDEX: 57 G/M2
LEFT VENTRICLE SYSTOLIC VOLUME INDEX: 23.6 ML/M2
LEFT VENTRICLE SYSTOLIC VOLUME: 46.88 ML
LEFT VENTRICULAR INTERNAL DIMENSION IN DIASTOLE: 5.26 CM (ref 3.5–6)
LEFT VENTRICULAR MASS: 112.25 G
LV LATERAL E/E' RATIO: 3.29 M/S
LV SEPTAL E/E' RATIO: 4.18 M/S
MV PEAK A VEL: 0.43 M/S
MV PEAK E VEL: 0.46 M/S
PISA TR MAX VEL: 2.11 M/S
PULM VEIN S/D RATIO: 0.78
PV PEAK D VEL: 0.5 M/S
PV PEAK S VEL: 0.39 M/S
RA MAJOR: 5.65 CM
RA PRESSURE: 3 MMHG
RA WIDTH: 4.69 CM
RIGHT VENTRICULAR END-DIASTOLIC DIMENSION: 4.41 CM
RV TISSUE DOPPLER FREE WALL SYSTOLIC VELOCITY 1 (APICAL 4 CHAMBER VIEW): 12.65 CM/S
SINUS: 3.6 CM
STJ: 3.33 CM
TDI LATERAL: 0.14 M/S
TDI SEPTAL: 0.11 M/S
TDI: 0.13 M/S
TR MAX PG: 18 MMHG
TRICUSPID ANNULAR PLANE SYSTOLIC EXCURSION: 3.36 CM
TV REST PULMONARY ARTERY PRESSURE: 21 MMHG

## 2020-06-01 PROCEDURE — 93306 TTE W/DOPPLER COMPLETE: CPT

## 2020-06-01 PROCEDURE — 99999 PR PBB SHADOW E&M-EST. PATIENT-LVL III: ICD-10-PCS | Mod: PBBFAC,,, | Performed by: INTERNAL MEDICINE

## 2020-06-01 PROCEDURE — 99213 OFFICE O/P EST LOW 20 MIN: CPT | Mod: S$GLB,,, | Performed by: INTERNAL MEDICINE

## 2020-06-01 PROCEDURE — 99999 PR PBB SHADOW E&M-EST. PATIENT-LVL III: CPT | Mod: PBBFAC,,, | Performed by: INTERNAL MEDICINE

## 2020-06-01 PROCEDURE — 93306 ECHO (CUPID ONLY): ICD-10-PCS | Mod: 26,,, | Performed by: INTERNAL MEDICINE

## 2020-06-01 PROCEDURE — 3079F DIAST BP 80-89 MM HG: CPT | Mod: CPTII,S$GLB,, | Performed by: INTERNAL MEDICINE

## 2020-06-01 PROCEDURE — 99213 PR OFFICE/OUTPT VISIT, EST, LEVL III, 20-29 MIN: ICD-10-PCS | Mod: S$GLB,,, | Performed by: INTERNAL MEDICINE

## 2020-06-01 PROCEDURE — 3008F PR BODY MASS INDEX (BMI) DOCUMENTED: ICD-10-PCS | Mod: CPTII,S$GLB,, | Performed by: INTERNAL MEDICINE

## 2020-06-01 PROCEDURE — 3008F BODY MASS INDEX DOCD: CPT | Mod: CPTII,S$GLB,, | Performed by: INTERNAL MEDICINE

## 2020-06-01 PROCEDURE — 3079F PR MOST RECENT DIASTOLIC BLOOD PRESSURE 80-89 MM HG: ICD-10-PCS | Mod: CPTII,S$GLB,, | Performed by: INTERNAL MEDICINE

## 2020-06-01 PROCEDURE — 3075F SYST BP GE 130 - 139MM HG: CPT | Mod: CPTII,S$GLB,, | Performed by: INTERNAL MEDICINE

## 2020-06-01 PROCEDURE — 3075F PR MOST RECENT SYSTOLIC BLOOD PRESS GE 130-139MM HG: ICD-10-PCS | Mod: CPTII,S$GLB,, | Performed by: INTERNAL MEDICINE

## 2020-06-01 PROCEDURE — 93306 TTE W/DOPPLER COMPLETE: CPT | Mod: 26,,, | Performed by: INTERNAL MEDICINE

## 2020-06-01 NOTE — NURSING
Patient identified by 2 identifiers.   Allergies reviewed.  20 g IV placed to Lt FA .  Bubble study explained to patient, patient verbalized understanding.  Bubble performed X 2, with & without Valsalva.  Pt tolerated procedure well.  IV d/c with catheter intact, pressure dsg applied.
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
## 2020-06-01 NOTE — ASSESSMENT & PLAN NOTE
- Successful closure of a 12-1/2 mm PFO with a 35 mm Amplatzer PFO occluder.  - TTE today without right to left shunt, reviewed by Dr. Gloria.   - On ASA/Plavix.

## 2020-07-22 DIAGNOSIS — R30.0 DYSURIA: ICD-10-CM

## 2020-07-22 RX ORDER — TAMSULOSIN HYDROCHLORIDE 0.4 MG/1
1 CAPSULE ORAL DAILY
Qty: 30 CAPSULE | Refills: 0 | Status: SHIPPED | OUTPATIENT
Start: 2020-07-22 | End: 2020-11-16 | Stop reason: SDUPTHER

## 2020-07-22 NOTE — TELEPHONE ENCOUNTER
----- Message from Anatoly White sent at 7/22/2020 10:06 AM CDT -----  Patient is calling to get a prescription refill. Patient phone number [phone]. Thanks!

## 2020-07-24 ENCOUNTER — OFFICE VISIT (OUTPATIENT)
Dept: UROLOGY | Facility: CLINIC | Age: 60
End: 2020-07-24
Payer: COMMERCIAL

## 2020-07-24 DIAGNOSIS — N40.1 BPH WITH OBSTRUCTION/LOWER URINARY TRACT SYMPTOMS: Primary | ICD-10-CM

## 2020-07-24 DIAGNOSIS — N13.8 BPH WITH OBSTRUCTION/LOWER URINARY TRACT SYMPTOMS: Primary | ICD-10-CM

## 2020-07-24 PROCEDURE — 99213 OFFICE O/P EST LOW 20 MIN: CPT | Mod: 95,,, | Performed by: NURSE PRACTITIONER

## 2020-07-24 PROCEDURE — 99213 PR OFFICE/OUTPT VISIT, EST, LEVL III, 20-29 MIN: ICD-10-PCS | Mod: 95,,, | Performed by: NURSE PRACTITIONER

## 2020-07-24 RX ORDER — TAMSULOSIN HYDROCHLORIDE 0.4 MG/1
0.4 CAPSULE ORAL DAILY
Qty: 90 CAPSULE | Refills: 3 | Status: SHIPPED | OUTPATIENT
Start: 2020-07-24 | End: 2020-11-18

## 2020-07-24 NOTE — PROGRESS NOTES
"  Subjective:      Gt Banerjee is a 59 y.o. male who returns today regarding his urinary symptoms.    The patient initially presented to the clinic in April 2019 with dysuria, frequency, urgency, and slow stream. Urine culture showed "no growth." Now taking Flomax with significant improvement in his symptoms. His AUASS today is 9/1. Denies adverse SE of the medication.    Denies ED.   Denies a family history of prostate cancer.     The following portions of the patient's history were reviewed and updated as appropriate: allergies, current medications, past family history, past medical history, past social history, past surgical history and problem list.    Review of Systems  Constitutional: no fever or chills  ENT: no nasal congestion or sore throat  Respiratory: no cough or shortness of breath  Cardiovascular: no chest pain or palpitations  Gastrointestinal: no nausea or vomiting, tolerating diet  Genitourinary: as per HPI  Hematologic/Lymphatic: no easy bruising or lymphadenopathy  Musculoskeletal: no arthralgias or myalgias  Neurological: no seizures or tremors  Behavioral/Psych: no auditory or visual hallucinations     Objective:   Vitals: There were no vitals taken for this visit.    Physical Exam   Deferred, virtual visit     Lab Review   Urinalysis demonstrates: no sample, VV   Lab Results   Component Value Date    WBC 3.81 (L) 12/11/2019    HGB 15.8 12/11/2019    HCT 45.0 12/11/2019    MCV 96 12/11/2019     12/11/2019     Lab Results   Component Value Date    CREATININE 0.9 12/11/2019    BUN 20 12/11/2019     No results found for: PSA  Imaging   None    Assessment:   BPH with LUTS    Plan:   Diagnoses and all orders for this visit:    BPH with obstruction/lower urinary tract symptoms  -     tamsulosin (FLOMAX) 0.4 mg Cap; Take 1 capsule (0.4 mg total) by mouth once daily.  -     Prostate Specific Antigen, Diagnostic; Future    Plan:  --Continue flomax  --PSA with next lab draw  --Follow up in 1 " year     The patient location is: home     The chief complaint leading to consultation is: BPH       Visit type: audiovisual    Face to Face time with patient: 5 minutes    20 minutes of total time spent on the encounter, which includes face to face time and non-face to face time preparing to see the patient (eg, review of tests), Obtaining and/or reviewing separately obtained history, Documenting clinical information in the electronic or other health record, Independently interpreting results (not separately reported) and communicating results to the patient/family/caregiver, or Care coordination (not separately reported).     Each patient to whom he or she provides medical services by telemedicine is:  (1) informed of the relationship between the physician and patient and the respective role of any other health care provider with respect to management of the patient; and (2) notified that he or she may decline to receive medical services by telemedicine and may withdraw from such care at any time.    Notes: see above

## 2020-10-08 ENCOUNTER — PATIENT MESSAGE (OUTPATIENT)
Dept: CARDIOLOGY | Facility: CLINIC | Age: 60
End: 2020-10-08

## 2020-11-16 ENCOUNTER — OFFICE VISIT (OUTPATIENT)
Dept: CARDIOLOGY | Facility: CLINIC | Age: 60
End: 2020-11-16
Payer: COMMERCIAL

## 2020-11-16 ENCOUNTER — HOSPITAL ENCOUNTER (OUTPATIENT)
Dept: CARDIOLOGY | Facility: HOSPITAL | Age: 60
Discharge: HOME | End: 2020-11-16
Attending: INTERNAL MEDICINE
Payer: COMMERCIAL

## 2020-11-16 ENCOUNTER — PATIENT MESSAGE (OUTPATIENT)
Dept: UROLOGY | Facility: CLINIC | Age: 60
End: 2020-11-16

## 2020-11-16 ENCOUNTER — TELEPHONE (OUTPATIENT)
Dept: CARDIOLOGY | Facility: CLINIC | Age: 60
End: 2020-11-16

## 2020-11-16 VITALS
HEART RATE: 56 BPM | BODY MASS INDEX: 26.05 KG/M2 | WEIGHT: 182 LBS | DIASTOLIC BLOOD PRESSURE: 75 MMHG | HEIGHT: 70 IN | SYSTOLIC BLOOD PRESSURE: 142 MMHG

## 2020-11-16 VITALS
SYSTOLIC BLOOD PRESSURE: 147 MMHG | OXYGEN SATURATION: 99 % | DIASTOLIC BLOOD PRESSURE: 90 MMHG | WEIGHT: 187.63 LBS | HEART RATE: 49 BPM | BODY MASS INDEX: 26.86 KG/M2 | HEIGHT: 70 IN

## 2020-11-16 DIAGNOSIS — Z87.74 S/P PATENT FORAMEN OVALE CLOSURE: Primary | ICD-10-CM

## 2020-11-16 DIAGNOSIS — N13.8 BPH WITH OBSTRUCTION/LOWER URINARY TRACT SYMPTOMS: Primary | ICD-10-CM

## 2020-11-16 DIAGNOSIS — N40.1 BPH WITH OBSTRUCTION/LOWER URINARY TRACT SYMPTOMS: Primary | ICD-10-CM

## 2020-11-16 DIAGNOSIS — Q21.12 PFO (PATENT FORAMEN OVALE): ICD-10-CM

## 2020-11-16 DIAGNOSIS — Z86.73 HISTORY OF ISCHEMIC VERTEBROBASILAR ARTERY BRAINSTEM STROKE: ICD-10-CM

## 2020-11-16 DIAGNOSIS — Z87.74 S/P PATENT FORAMEN OVALE CLOSURE: ICD-10-CM

## 2020-11-16 LAB
ASCENDING AORTA: 3.59 CM
AV INDEX (PROSTH): 0.93
AV MEAN GRADIENT: 3 MMHG
AV PEAK GRADIENT: 6 MMHG
AV VALVE AREA: 4.07 CM2
AV VELOCITY RATIO: 0.96
BSA FOR ECHO PROCEDURE: 2.02 M2
CV ECHO LV RWT: 0.47 CM
DOP CALC AO PEAK VEL: 1.21 M/S
DOP CALC AO VTI: 26.85 CM
DOP CALC LVOT AREA: 4.4 CM2
DOP CALC LVOT DIAMETER: 2.36 CM
DOP CALC LVOT PEAK VEL: 1.16 M/S
DOP CALC LVOT STROKE VOLUME: 109.22 CM3
DOP CALCLVOT PEAK VEL VTI: 24.98 CM
E WAVE DECELERATION TIME: 271.46 MSEC
E/A RATIO: 0.88
E/E' RATIO: 4.84 M/S
ECHO LV POSTERIOR WALL: 1 CM (ref 0.6–1.1)
FRACTIONAL SHORTENING: 25 % (ref 28–44)
INTERVENTRICULAR SEPTUM: 1.1 CM (ref 0.6–1.1)
LA MAJOR: 4.53 CM
LA MINOR: 4.69 CM
LA WIDTH: 4.04 CM
LEFT ATRIUM SIZE: 3.92 CM
LEFT ATRIUM VOLUME INDEX: 30.9 ML/M2
LEFT ATRIUM VOLUME: 62.04 CM3
LEFT INTERNAL DIMENSION IN SYSTOLE: 3.21 CM (ref 2.1–4)
LEFT VENTRICLE DIASTOLIC VOLUME INDEX: 36.09 ML/M2
LEFT VENTRICLE DIASTOLIC VOLUME: 72.37 ML
LEFT VENTRICLE MASS INDEX: 76 G/M2
LEFT VENTRICLE SYSTOLIC VOLUME INDEX: 20.6 ML/M2
LEFT VENTRICLE SYSTOLIC VOLUME: 41.26 ML
LEFT VENTRICULAR INTERNAL DIMENSION IN DIASTOLE: 4.3 CM (ref 3.5–6)
LEFT VENTRICULAR MASS: 152.55 G
LV LATERAL E/E' RATIO: 4.6 M/S
LV SEPTAL E/E' RATIO: 5.11 M/S
MV PEAK A VEL: 0.52 M/S
MV PEAK E VEL: 0.46 M/S
MV STENOSIS PRESSURE HALF TIME: 78.72 MS
MV VALVE AREA P 1/2 METHOD: 2.79 CM2
PISA TR MAX VEL: 1.94 M/S
PULM VEIN S/D RATIO: 1.03
PV PEAK D VEL: 0.39 M/S
PV PEAK S VEL: 0.4 M/S
RA MAJOR: 4.79 CM
RA PRESSURE: 3 MMHG
RA WIDTH: 3.98 CM
RIGHT VENTRICULAR END-DIASTOLIC DIMENSION: 4.27 CM
RV TISSUE DOPPLER FREE WALL SYSTOLIC VELOCITY 1 (APICAL 4 CHAMBER VIEW): 13.19 CM/S
SINUS: 3.9 CM
STJ: 3.2 CM
TDI LATERAL: 0.1 M/S
TDI SEPTAL: 0.09 M/S
TDI: 0.1 M/S
TR MAX PG: 15 MMHG
TRICUSPID ANNULAR PLANE SYSTOLIC EXCURSION: 2.66 CM
TV REST PULMONARY ARTERY PRESSURE: 18 MMHG

## 2020-11-16 PROCEDURE — 93306 TTE W/DOPPLER COMPLETE: CPT | Mod: 26,,, | Performed by: INTERNAL MEDICINE

## 2020-11-16 PROCEDURE — 1126F PR PAIN SEVERITY QUANTIFIED, NO PAIN PRESENT: ICD-10-PCS | Mod: S$GLB,,, | Performed by: INTERNAL MEDICINE

## 2020-11-16 PROCEDURE — 3008F PR BODY MASS INDEX (BMI) DOCUMENTED: ICD-10-PCS | Mod: CPTII,S$GLB,, | Performed by: INTERNAL MEDICINE

## 2020-11-16 PROCEDURE — 99213 PR OFFICE/OUTPT VISIT, EST, LEVL III, 20-29 MIN: ICD-10-PCS | Mod: S$GLB,,, | Performed by: INTERNAL MEDICINE

## 2020-11-16 PROCEDURE — 1126F AMNT PAIN NOTED NONE PRSNT: CPT | Mod: S$GLB,,, | Performed by: INTERNAL MEDICINE

## 2020-11-16 PROCEDURE — 99999 PR PBB SHADOW E&M-EST. PATIENT-LVL III: CPT | Mod: PBBFAC,,, | Performed by: INTERNAL MEDICINE

## 2020-11-16 PROCEDURE — 99213 OFFICE O/P EST LOW 20 MIN: CPT | Mod: S$GLB,,, | Performed by: INTERNAL MEDICINE

## 2020-11-16 PROCEDURE — 93306 ECHO (CUPID ONLY): ICD-10-PCS | Mod: 26,,, | Performed by: INTERNAL MEDICINE

## 2020-11-16 PROCEDURE — 99999 PR PBB SHADOW E&M-EST. PATIENT-LVL III: ICD-10-PCS | Mod: PBBFAC,,, | Performed by: INTERNAL MEDICINE

## 2020-11-16 PROCEDURE — 3008F BODY MASS INDEX DOCD: CPT | Mod: CPTII,S$GLB,, | Performed by: INTERNAL MEDICINE

## 2020-11-16 PROCEDURE — 93306 TTE W/DOPPLER COMPLETE: CPT

## 2020-11-16 NOTE — ASSESSMENT & PLAN NOTE
35 mm Amplatzer PFO occluder. TTE done at 6 month follow up showed residual large right to left shunt. TTE repeated today which shows

## 2020-11-16 NOTE — TELEPHONE ENCOUNTER
"You have been scheduled for a procedure in the echo lab on Friday, December 4, 2020.     Please report to the Cardiology Waiting Area on the Third floor of the hospital and check in at 8 AM.     You will then be taken to the SSCU (Short Stay Cardiac Unit) and prepared for your procedure. Please be aware that this is not the time of your procedure but the time you are to arrive. The procedures are scheduled on an hourly basis; however, emergency cases take precedence over all other cases.     You may not have anything to eat or drink after midnight the night before your test. You may take your regular morning medications with water.    The procedure will take 1-2 hours to perform. After the procedure, you will return to SSCU on the third floor of the hospital. Your family may remain in the room with you during this time.     You will be discharged home that same afternoon. You must have someone to drive you home.  Your doctor will determine, based on your progress, the time of your discharge. The results of your procedure will be discussed with you before you are discharged. Any further testing or procedures will be scheduled for you either before you leave or you will be called with these appointments.       If you should have any questions, concerns, or need to change the date of your procedure, please call  JULES Mccurdy @ [phone]",," Estelle RN @ [phone]    Special Instructions:  none        THE ABOVE INSTRUCTIONS WERE GIVEN TO THE PATIENT VERBALLY AND THEY VERBALIZED UNDERSTANDING.  THEY DO NOT REQUIRE ANY SPECIAL NEEDS AND DO NOT HAVE ANY LEARNING BARRIERS.          Directions for Reporting to Cardiology Waiting Area in the Hospital  If you park in the Parking Garage:  Take elevators to the1st floor of the parking garage.  Continue past the gift shop, coffee shop, and piano.  Take a right and go to the gold elevators. (Elevator B)  Take the elevator to the 3rd floor.  Follow the arrow on the sign on " the wall that says Cath Lab Registration/EP Lab Registration.  Follow the long hallway all the way around until you come to a big open area.  This is the registration area.  Check in at Reception Desk.    OR    If family is dropping you off:  Have them drop you off at the front of the Hospital under the green overhang.  Enter through the doors and take a right.  Take the E elevators to the 3rd floor Cardiology Waiting Area.  Check in at the Reception Desk in the waiting room.
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
## 2020-11-16 NOTE — NURSING NOTE
Patient identified by 2 identifiers.   Allergies reviewed.  22 g IV placed to Lt AC .  Bubble study explained to patient, patient verbalized understanding.  Bubble performed X 2, with & without Valsalva.  Pt tolerated procedure well.  IV d/c with catheter intact, pressure dsg applied.

## 2020-11-17 ENCOUNTER — PATIENT MESSAGE (OUTPATIENT)
Dept: CARDIOLOGY | Facility: CLINIC | Age: 60
End: 2020-11-17

## 2020-11-18 DIAGNOSIS — N13.8 BPH WITH OBSTRUCTION/LOWER URINARY TRACT SYMPTOMS: ICD-10-CM

## 2020-11-18 DIAGNOSIS — N40.1 BPH WITH OBSTRUCTION/LOWER URINARY TRACT SYMPTOMS: ICD-10-CM

## 2020-11-18 RX ORDER — TAMSULOSIN HYDROCHLORIDE 0.4 MG/1
CAPSULE ORAL
Qty: 30 CAPSULE | Refills: 0 | Status: SHIPPED | OUTPATIENT
Start: 2020-11-18 | End: 2021-10-19

## 2020-11-18 NOTE — TELEPHONE ENCOUNTER
----- Message from Jessica Valencia NP sent at 11/17/2020  7:48 AM CST -----  Spoke with the pt. Clarified the reasoning for rechecking the PSA in 6 months. Please schedule repeat PSA in 6 months. Thanks!  ----- Message -----  From: Napoleon Brar  Sent: 11/16/2020   3:52 PM CST  To: Jessica Valencia NP    Spoke with patient to schedule psa in 6 months. Patient stated he had PSA drawn today. I let him know you don't need him to have this PSA drawn until 6 months from now. He still requested that I confirm that with you and call him back. Please call the patient to advise. Thanks!  ----- Message -----  From: Jessica Valencia NP  Sent: 11/16/2020   1:05 PM CST  To: Napoleon Brar    Please schedule PSA in 6 months. Thanks!

## 2020-11-23 ENCOUNTER — TELEPHONE (OUTPATIENT)
Dept: PEDIATRICS | Facility: CLINIC | Age: 60
End: 2020-11-23

## 2020-11-23 NOTE — TELEPHONE ENCOUNTER
LVM to inform patient of the location change for COVID testing. Informed patient that the appointment would still be on the same date and time but it would be through the drive through testing center at [facility] at [address] [place] [address].

## 2020-12-03 ENCOUNTER — LAB VISIT (OUTPATIENT)
Dept: INTERNAL MEDICINE | Facility: CLINIC | Age: 60
End: 2020-12-03
Payer: COMMERCIAL

## 2020-12-03 ENCOUNTER — ANESTHESIA EVENT (OUTPATIENT)
Dept: MEDSURG UNIT | Facility: HOSPITAL | Age: 60
End: 2020-12-03
Payer: COMMERCIAL

## 2020-12-03 DIAGNOSIS — Q21.12 PFO (PATENT FORAMEN OVALE): ICD-10-CM

## 2020-12-03 PROCEDURE — U0003 INFECTIOUS AGENT DETECTION BY NUCLEIC ACID (DNA OR RNA); SEVERE ACUTE RESPIRATORY SYNDROME CORONAVIRUS 2 (SARS-COV-2) (CORONAVIRUS DISEASE [COVID-19]), AMPLIFIED PROBE TECHNIQUE, MAKING USE OF HIGH THROUGHPUT TECHNOLOGIES AS DESCRIBED BY CMS-2020-01-R: HCPCS

## 2020-12-04 ENCOUNTER — HOSPITAL ENCOUNTER (OUTPATIENT)
Facility: HOSPITAL | Age: 60
Discharge: HOME | End: 2020-12-04
Attending: INTERNAL MEDICINE | Admitting: INTERNAL MEDICINE
Payer: COMMERCIAL

## 2020-12-04 ENCOUNTER — HOSPITAL ENCOUNTER (OUTPATIENT)
Dept: CARDIOLOGY | Facility: HOSPITAL | Age: 60
Discharge: HOME | End: 2020-12-04
Attending: INTERNAL MEDICINE
Payer: COMMERCIAL

## 2020-12-04 ENCOUNTER — ANESTHESIA (OUTPATIENT)
Dept: MEDSURG UNIT | Facility: HOSPITAL | Age: 60
End: 2020-12-04
Payer: COMMERCIAL

## 2020-12-04 VITALS
BODY MASS INDEX: 26.34 KG/M2 | SYSTOLIC BLOOD PRESSURE: 125 MMHG | WEIGHT: 184 LBS | HEIGHT: 70 IN | DIASTOLIC BLOOD PRESSURE: 80 MMHG

## 2020-12-04 VITALS
DIASTOLIC BLOOD PRESSURE: 80 MMHG | OXYGEN SATURATION: 100 % | SYSTOLIC BLOOD PRESSURE: 114 MMHG | WEIGHT: 184 LBS | TEMPERATURE: 98 F | HEIGHT: 70 IN | HEART RATE: 55 BPM | RESPIRATION RATE: 20 BRPM | BODY MASS INDEX: 26.34 KG/M2

## 2020-12-04 DIAGNOSIS — Q21.12 PFO (PATENT FORAMEN OVALE): ICD-10-CM

## 2020-12-04 DIAGNOSIS — Z87.74 S/P PATENT FORAMEN OVALE CLOSURE: ICD-10-CM

## 2020-12-04 LAB
BSA FOR ECHO PROCEDURE: 2.03 M2
SARS-COV-2 RNA RESP QL NAA+PROBE: NOT DETECTED

## 2020-12-04 PROCEDURE — 93320 DOPPLER ECHO COMPLETE: CPT

## 2020-12-04 PROCEDURE — 37000008 HC ANESTHESIA 1ST 15 MINUTES

## 2020-12-04 PROCEDURE — D9220A PRA ANESTHESIA: Mod: ANES,,, | Performed by: ANESTHESIOLOGY

## 2020-12-04 PROCEDURE — 93010 EKG 12-LEAD: ICD-10-PCS | Mod: ,,, | Performed by: INTERNAL MEDICINE

## 2020-12-04 PROCEDURE — 93005 ELECTROCARDIOGRAM TRACING: CPT

## 2020-12-04 PROCEDURE — 93320 DOPPLER ECHO COMPLETE: CPT | Mod: 26,,, | Performed by: INTERNAL MEDICINE

## 2020-12-04 PROCEDURE — D9220A PRA ANESTHESIA: ICD-10-PCS | Mod: ANES,,, | Performed by: ANESTHESIOLOGY

## 2020-12-04 PROCEDURE — 63600175 PHARM REV CODE 636 W HCPCS: Performed by: NURSE ANESTHETIST, CERTIFIED REGISTERED

## 2020-12-04 PROCEDURE — 93320 TRANSESOPHAGEAL ECHO (TEE) (CUPID ONLY): ICD-10-PCS | Mod: 26,,, | Performed by: INTERNAL MEDICINE

## 2020-12-04 PROCEDURE — 93325 DOPPLER ECHO COLOR FLOW MAPG: CPT | Mod: 26,,, | Performed by: INTERNAL MEDICINE

## 2020-12-04 PROCEDURE — 93312 TRANSESOPHAGEAL ECHO (TEE) (CUPID ONLY): ICD-10-PCS | Mod: 26,,, | Performed by: INTERNAL MEDICINE

## 2020-12-04 PROCEDURE — D9220A PRA ANESTHESIA: ICD-10-PCS | Mod: CRNA,,, | Performed by: NURSE ANESTHETIST, CERTIFIED REGISTERED

## 2020-12-04 PROCEDURE — 25000003 PHARM REV CODE 250: Performed by: NURSE ANESTHETIST, CERTIFIED REGISTERED

## 2020-12-04 PROCEDURE — 93325 PR DOPPLER COLOR FLOW VELOCITY MAP: ICD-10-PCS | Mod: 26,,, | Performed by: INTERNAL MEDICINE

## 2020-12-04 PROCEDURE — 93312 ECHO TRANSESOPHAGEAL: CPT | Mod: 26,,, | Performed by: INTERNAL MEDICINE

## 2020-12-04 PROCEDURE — D9220A PRA ANESTHESIA: Mod: CRNA,,, | Performed by: NURSE ANESTHETIST, CERTIFIED REGISTERED

## 2020-12-04 PROCEDURE — 93010 ELECTROCARDIOGRAM REPORT: CPT | Mod: ,,, | Performed by: INTERNAL MEDICINE

## 2020-12-04 PROCEDURE — 37000009 HC ANESTHESIA EA ADD 15 MINS

## 2020-12-04 RX ORDER — PROPOFOL 10 MG/ML
VIAL (ML) INTRAVENOUS
Status: DISCONTINUED | OUTPATIENT
Start: 2020-12-04 | End: 2020-12-04

## 2020-12-04 RX ORDER — SODIUM CHLORIDE 0.9 % (FLUSH) 0.9 %
10 SYRINGE (ML) INJECTION
Status: DISCONTINUED | OUTPATIENT
Start: 2020-12-04 | End: 2020-12-04 | Stop reason: HOSPADM

## 2020-12-04 RX ORDER — FENTANYL CITRATE 50 UG/ML
INJECTION, SOLUTION INTRAMUSCULAR; INTRAVENOUS
Status: DISCONTINUED | OUTPATIENT
Start: 2020-12-04 | End: 2020-12-04

## 2020-12-04 RX ORDER — LIDOCAINE HYDROCHLORIDE 20 MG/ML
INJECTION INTRAVENOUS
Status: DISCONTINUED | OUTPATIENT
Start: 2020-12-04 | End: 2020-12-04

## 2020-12-04 RX ORDER — PROPOFOL 10 MG/ML
VIAL (ML) INTRAVENOUS CONTINUOUS PRN
Status: DISCONTINUED | OUTPATIENT
Start: 2020-12-04 | End: 2020-12-04

## 2020-12-04 RX ADMIN — FENTANYL CITRATE 50 MCG: 50 INJECTION INTRAMUSCULAR; INTRAVENOUS at 09:12

## 2020-12-04 RX ADMIN — PROPOFOL 50 MG: 10 INJECTION, EMULSION INTRAVENOUS at 10:12

## 2020-12-04 RX ADMIN — SODIUM CHLORIDE, SODIUM GLUCONATE, SODIUM ACETATE, POTASSIUM CHLORIDE, MAGNESIUM CHLORIDE, SODIUM PHOSPHATE, DIBASIC, AND POTASSIUM PHOSPHATE: .53; .5; .37; .037; .03; .012; .00082 INJECTION, SOLUTION INTRAVENOUS at 10:12

## 2020-12-04 RX ADMIN — PROPOFOL 150 MCG/KG/MIN: 10 INJECTION, EMULSION INTRAVENOUS at 10:12

## 2020-12-04 RX ADMIN — LIDOCAINE HYDROCHLORIDE 100 MG: 20 INJECTION, SOLUTION INTRAVENOUS at 10:12

## 2020-12-04 NOTE — NURSING TRANSFER
Nursing Transfer Note      12/4/2020     Transfer To: [facility]    Transfer via stretcher    Transfer with reported to epi [place]    Transported by loki rn    Medicines sent: none    Chart send with patient: Yes    Notified: to room no complaints no distress noted    Patient reassessed at: see epic (date, time)    Upon arrival to floor: to room no complaints no distress noted.

## 2020-12-04 NOTE — PROGRESS NOTES
Received report from Krystin SIERRA PACU RN. Patient s/p REDD, AAOx4. VSS, no c/o pain or discomfort at this time, resp even and unlabored. Post procedure protocol reviewed with patient. Understanding verbalized. Spouse at bedside. Nurse call bell within reach. Will continue to monitor per post procedure protocol.

## 2020-12-04 NOTE — DISCHARGE SUMMARY
[facility]  Cardiology  Discharge Summary      Patient Name: Gt Banerjee  MRN: [id number]  Admission Date: 12/4/2020  Hospital Length of Stay: 0 days  Discharge Date and Time:  12/04/2020 12:24 PM  Attending Physician: Donnie Gloria MD    Discharging Provider: Augustin Nassar MD  Primary Care Physician: Primary Doctor No    HPI:   60 M, cryptogenic stroke ischemic vertebrobasilar artery brainstem stroke, S/P patent foramen ovale closure with a 35 mm Amplatzer PFO occluder. At the time of PFO closure, there was evidence of immediate right-to-left shunting after PFO closure by intracardiac echo with bubble study. However, TTE with Bubble on 6/2020 and 11/2020 showed right to left shunt across the interatrial septum. Shunting occurs in two cardiac cycles. Color flow doppler is also positive for shunt. He presented today for elective repeat REDD to evaluate for closure of the residual shunt.     Card Meds: Aspirin 81 mg, Lipitor and Flomax     TTE 11/2020   · The left ventricle is normal in size with normal systolic function. The estimated ejection fraction is 60%.  · There is left ventricular concentric remodeling.  · Normal left ventricular diastolic function. · Mild right ventricular enlargement with normal right ventricular systolic function.  · The estimated PA systolic pressure is 18 mmHg. · Normal central venous pressure (3 mmHg).  · The ascending aorta is mildly dilated at 3.9cm. The aorta is not as well seen as the prior study, which demonstrated a 4.4cm proximal ascending aorta.  · S/p 35mm Amplatzer PFO occluder. Study is positive for a right to left shunt (image 113). Bubbles cross the interatrial septum within 3 cardiac cycles. Color flow doppler also positive for shunt.    REDD 11/29/2019   · Patent foramen ovale present with right to left shunting indicated by color flow Doppler and saline contrast. Bubbles immediately cross on the first beat. Findings consistent  with moderate-sized PFO.  · Atrial septal aneurysm present which bulges to the left and protrudes approximately 0.6 cm ( producing small hole) beyond the plane of the atrial septum. Lip measures 1.2 cm.    Procedure(s) (LRB):  ECHOCARDIOGRAM,TRANSESOPHAGEAL (N/A)     Hospital Course:  Underwent REDD to evaluate for interatrial shunt. S/p 35mm Amplatzer PFO occluder. REDD showed positive for a right to left shunt. Bubbles cross the interatrial septum at the anterior/superior margin of the device.. Continue current medications with plan to follow up with Dr. Gloria for further management.     Consults:     Significant Diagnostic Studies: Labs:      Lab Results   Component Value Date    INR 0.9 11/16/2020    INR 1.0 12/11/2019    INR 1.0 11/25/2019   , Lipid Panel   Lab Results   Component Value Date    CHOL 213 (H) 07/31/2019    HDL 66 07/31/2019    LDLCALC 129.0 07/31/2019    TRIG 90 07/31/2019    CHOLHDL 31.0 07/31/2019     Pending Diagnostic Studies:     None          Final Active Diagnoses:    Diagnosis Date Noted POA    S/P patent foramen ovale closure [Z87.74] 06/01/2020 Not Applicable    PFO (patent foramen ovale) [Q21.1] 11/25/2019 Not Applicable      Problems Resolved During this Admission:     Discharged Condition: stable    Disposition: Home or Self Care    Medications:  Reconciled Home Medications:      Medication List      CONTINUE taking these medications    aspirin 81 MG EC tablet  Commonly known as: ECOTRIN  Take 81 mg by mouth once daily.     atorvastatin 20 MG tablet  Commonly known as: LIPITOR  TAKE ONE TABLET BY MOUTH EVERY DAY     tamsulosin 0.4 mg Cap  Commonly known as: FLOMAX  TAKE ONE CAPSULE BY MOUTH EVERY DAY          Time spent on the discharge of patient: 35 minutes    Augustin Nassar MD  Cardiology  [facility]

## 2020-12-04 NOTE — HOSPITAL COURSE
Underwent REDD to evaluate for interatrial shunt. S/p 35mm Amplatzer PFO occluder. REDD showed positive for a right to left shunt. Bubbles cross the interatrial septum at the anterior/superior margin of the device.. Continue current medications with plan to follow up with Dr. Gloria for further management.

## 2020-12-04 NOTE — H&P
[facility]  Cardiology  History and Physical     Patient Name: Gt Banerjee  MRN: [id number]  Admission Date: 12/4/2020  Code Status: Full Code   Attending Provider: Donnie Gloria MD   Primary Care Physician: Primary Doctor No  Principal Problem:<principal problem not specified>    Patient information was obtained from patient.     Subjective:     Chief Complaint:  PFO     HPI:  60 M, cryptogenic stroke ischemic vertebrobasilar artery brainstem stroke, S/P patent foramen ovale closure with a 35 mm Amplatzer PFO occluder. At the time of PFO closure, there was evidence of immediate right-to-left shunting after PFO closure by intracardiac echo with bubble study. However, TTE with Bubble on 6/2020 and 11/2020 showed right to left shunt across the interatrial septum. Shunting occurs in two cardiac cycles. Color flow doppler is also positive for shunt. He presented today for elective repeat REDD to evaluate for closure of the residual shunt.     Card Meds: Aspirin 81 mg, Lipitor and Flomax     TTE 11/2020   · The left ventricle is normal in size with normal systolic function. The estimated ejection fraction is 60%.  · There is left ventricular concentric remodeling.  · Normal left ventricular diastolic function. · Mild right ventricular enlargement with normal right ventricular systolic function.  · The estimated PA systolic pressure is 18 mmHg. · Normal central venous pressure (3 mmHg).  · The ascending aorta is mildly dilated at 3.9cm. The aorta is not as well seen as the prior study, which demonstrated a 4.4cm proximal ascending aorta.  · S/p 35mm Amplatzer PFO occluder. Study is positive for a right to left shunt (image 113). Bubbles cross the interatrial septum within 3 cardiac cycles. Color flow doppler also positive for shunt.    REDD 11/29/2019   · Patent foramen ovale present with right to left shunting indicated by color flow Doppler and saline contrast. Bubbles immediately  cross on the first beat. Findings consistent with moderate-sized PFO.  · Atrial septal aneurysm present which bulges to the left and protrudes approximately 0.6 cm ( producing small hole) beyond the plane of the atrial septum. Lip measures 1.2 cm.    Past Medical History:   Diagnosis Date    Stroke        Past Surgical History:   Procedure Laterality Date    VASECTOMY         Review of patient's allergies indicates:  No Known Allergies    No current facility-administered medications on file prior to encounter.      Current Outpatient Medications on File Prior to Encounter   Medication Sig    aspirin (ECOTRIN) 81 MG EC tablet Take 81 mg by mouth once daily.     Family History     Problem Relation (Age of Onset)    Hypertension Mother, Father    Stroke Mother        Tobacco Use    Smoking status: Never Smoker    Smokeless tobacco: Never Used   Substance and Sexual Activity    Alcohol use: Yes     Alcohol/week: 1.0 standard drinks     Types: 1 Shots of liquor per week     Frequency: Monthly or less     Comment: moderate    Drug use: Never    Sexual activity: Not on file     Review of Systems   Constitution: Negative for chills and decreased appetite.   HENT: Negative for congestion.    Cardiovascular: Negative for chest pain, irregular heartbeat and leg swelling.   Respiratory: Negative for cough and shortness of breath.    Endocrine: Negative for cold intolerance and heat intolerance.   Skin: Negative for rash.   Musculoskeletal: Negative for arthritis and back pain.   Gastrointestinal: Negative for abdominal pain, constipation and diarrhea.   Genitourinary: Negative for dysuria and hematuria.   Neurological: Negative for dizziness and headaches.   Psychiatric/Behavioral: Negative for altered mental status.     Objective:     Vital Signs (Most Recent):    Vital Signs (24h Range):  BP: ()/()   Arterial Line BP: ()/()         There is no height or weight on file to calculate BMI.            No intake or output  data in the 24 hours ending 12/04/20 0832    Lines/Drains/Airways     None                 Physical Exam   Constitutional: He is oriented to person, place, and time. He appears well-nourished. No distress.   HENT:   Head: Normocephalic.   Eyes: Pupils are equal, round, and reactive to light.   Neck: No JVD present. No thyromegaly present.   Cardiovascular: Normal rate. Exam reveals no friction rub.   No murmur heard.  Pulmonary/Chest: Effort normal. No respiratory distress.   Abdominal: Soft. He exhibits no distension.   Musculoskeletal:         General: No edema.   Neurological: He is alert and oriented to person, place, and time.   Vitals reviewed.      Significant Labs: All pertinent lab results from the last 24 hours have been reviewed.    Significant Imaging: Echocardiogram:   2D echo with color flow doppler: No results found for this or any previous visit. and Transthoracic echo (TTE) complete (Cupid Only):   Results for orders placed or performed during the hospital encounter of 11/16/20   Echo Color Flow Doppler? Yes; Bubble Contrast? Yes   Result Value Ref Range    BSA 2.02 m2    TDI SEPTAL 0.09 m/s    LV LATERAL E/E' RATIO 4.60 m/s    LV SEPTAL E/E' RATIO 5.11 m/s    LA WIDTH 4.04 cm    TDI LATERAL 0.10 m/s    LVIDd 4.30 3.5 - 6.0 cm    IVS 1.10 (A) 0.6 - 1.1 cm    Posterior Wall 1.00 0.6 - 1.1 cm    LVIDs 3.21 2.1 - 4.0 cm    FS 25 28 - 44 %    LA volume 62.04 cm3    Sinus 3.90 cm    STJ 3.20 cm    Ascending aorta 3.59 cm    LV mass 152.55 g    LA size 3.92 cm    RVDD 4.27 cm    TAPSE 2.66 cm    RV S' 13.19 cm/s    Left Ventricle Relative Wall Thickness 0.47 cm    AV mean gradient 3 mmHg    AV valve area 4.07 cm2    AV Velocity Ratio 0.96     AV index (prosthetic) 0.93     MV valve area p 1/2 method 2.79 cm2    E/A ratio 0.88     Mean e' 0.10 m/s    E wave decelartion time 271.46 msec    Pulm vein S/D ratio 1.03     LVOT diameter 2.36 cm    LVOT area 4.4 cm2    LVOT peak danielle 1.16 m/s    LVOT peak VTI  24.98 cm    Ao peak chai 1.21 m/s    Ao VTI 26.85 cm    LVOT stroke volume 109.22 cm3    AV peak gradient 6 mmHg    E/E' ratio 4.84 m/s    MV Peak E Chai 0.46 m/s    TR Max Chai 1.94 m/s    MV stenosis pressure 1/2 time 78.72 ms    MV Peak A Chai 0.52 m/s    PV Peak S Chai 0.40 m/s    PV Peak D Chai 0.39 m/s    LV Systolic Volume 41.26 mL    LV Systolic Volume Index 20.6 mL/m2    LV Diastolic Volume 72.37 mL    LV Diastolic Volume Index 36.09 mL/m2    LA Volume Index 30.9 mL/m2    LV Mass Index 76 g/m2    RA Major Axis 4.79 cm    Left Atrium Minor Axis 4.69 cm    Left Atrium Major Axis 4.53 cm    Triscuspid Valve Regurgitation Peak Gradient 15 mmHg    RA Width 3.98 cm    Right Atrial Pressure (from IVC) 3 mmHg    TV rest pulmonary artery pressure 18 mmHg    Narrative    · The left ventricle is normal in size with normal systolic function. The   estimated ejection fraction is 60%.  · There is left ventricular concentric remodeling.  · Normal left ventricular diastolic function.  · Mild right ventricular enlargement with normal right ventricular   systolic function.  · The estimated PA systolic pressure is 18 mmHg.  · Normal central venous pressure (3 mmHg).  · The ascending aorta is mildly dilated at 3.9cm. The aorta is not as well   seen as the prior study, which demonstrated a 4.4cm proximal ascending   aorta.  · S/p 35mm Amplatzer PFO occluder. Study is positive for a right to left   shunt (image 113). Bubbles cross the interatrial septum within 3 cardiac   cycles. Color flow doppler also positive for shunt.        Assessment and Plan:     S/P patent foramen ovale closure  60 M, cryptogenic stroke ischemic vertebrobasilar artery brainstem stroke, S/P patent foramen ovale closure with a 35 mm Amplatzer PFO occluder. At the time of PFO closure, there was evidence of immediate right-to-left shunting after PFO closure by intracardiac echo with bubble study. However, TTE with Bubble on 6/2020 and 11/2020 showed right to left  shunt across the interatrial septum. Shunting occurs in two cardiac cycles. Color flow doppler is also positive for shunt. He presented today for elective repeat REDD to evaluate for closure of the residual shunt.     Card Meds: Aspirin 81 mg, Lipitor and Flomax     TTE 11/2020   · The left ventricle is normal in size with normal systolic function. The estimated ejection fraction is 60%.  · There is left ventricular concentric remodeling.  · Normal left ventricular diastolic function. · Mild right ventricular enlargement with normal right ventricular systolic function.  · The estimated PA systolic pressure is 18 mmHg. · Normal central venous pressure (3 mmHg).  · The ascending aorta is mildly dilated at 3.9cm. The aorta is not as well seen as the prior study, which demonstrated a 4.4cm proximal ascending aorta.  · S/p 35mm Amplatzer PFO occluder. Study is positive for a right to left shunt (image 113). Bubbles cross the interatrial septum within 3 cardiac cycles. Color flow doppler also positive for shunt.      Dysphagia or odynophagia:  No  Liver Disease, esophageal disease, or known varices:  No  Upper GI Bleeding:No  Snoring:  Yes  Sleep Apnea:  No  Last oral intake: 12 hours ago  Able to move neck in all directions:  Yes    REDD for evaluation of /P patent foramen ovale closure with a 35 mm Amplatzer PFO occluder.  -No absolute contraindications of esophageal stricture, tumor, perforation, laceration,or diverticulum and/or active GI bleed  -The risks, benefits & alternatives of the procedure were explained to the patient.   -The risks of transesophageal echo include but are not limited to:  Dental trauma, esophageal trauma/perforation, bleeding, laryngospasm/brochospasm, aspiration, sore throat/hoarseness, & dislodgement of the endotracheal tube/nasogastric tube (where applicable).    -The risks of moderate sedation include hypotension, respiratory depression, arrhythmias, bronchospasm, & death.    -Informed  consent was obtained. The patient is agreeable to proceed with the procedure and all questions and concerns addressed.        VTE Risk Mitigation (From admission, onward)    None          Augustin Nassar MD  Cardiology   [facility]

## 2020-12-04 NOTE — ANESTHESIA POSTPROCEDURE EVALUATION
Anesthesia Post Evaluation    Patient: Gt Banerjee    Procedure(s) Performed: Procedure(s) (LRB):  ECHOCARDIOGRAM,TRANSESOPHAGEAL (N/A)    Final Anesthesia Type: general    Patient location during evaluation: PACU  Patient participation: Yes- Able to Participate  Level of consciousness: awake and alert and oriented  Post-procedure vital signs: reviewed and stable  Pain management: adequate  Airway patency: patent    PONV status at discharge: No PONV  Anesthetic complications: no      Cardiovascular status: blood pressure returned to baseline, hemodynamically stable and stable  Respiratory status: unassisted, room air and spontaneous ventilation  Hydration status: euvolemic  Follow-up not needed.          Vitals Value Taken Time   /80 12/04/20 1125   Temp 36.7 °C (98 °F) 12/04/20 1100   Pulse 55 12/04/20 1125   Resp 20 12/04/20 1125   SpO2 100 % 12/04/20 1125         Event Time   Out of Recovery 11:15:00         Pain/Araceli Score: Araceli Score: 10 (12/4/2020 11:06 AM)

## 2020-12-04 NOTE — SUBJECTIVE & OBJECTIVE
Past Medical History:   Diagnosis Date    Stroke        Past Surgical History:   Procedure Laterality Date    VASECTOMY         Review of patient's allergies indicates:  No Known Allergies    No current facility-administered medications on file prior to encounter.      Current Outpatient Medications on File Prior to Encounter   Medication Sig    aspirin (ECOTRIN) 81 MG EC tablet Take 81 mg by mouth once daily.     Family History     Problem Relation (Age of Onset)    Hypertension Mother, Father    Stroke Mother        Tobacco Use    Smoking status: Never Smoker    Smokeless tobacco: Never Used   Substance and Sexual Activity    Alcohol use: Yes     Alcohol/week: 1.0 standard drinks     Types: 1 Shots of liquor per week     Frequency: Monthly or less     Comment: moderate    Drug use: Never    Sexual activity: Not on file     Review of Systems   Constitution: Negative for chills and decreased appetite.   HENT: Negative for congestion.    Cardiovascular: Negative for chest pain, irregular heartbeat and leg swelling.   Respiratory: Negative for cough and shortness of breath.    Endocrine: Negative for cold intolerance and heat intolerance.   Skin: Negative for rash.   Musculoskeletal: Negative for arthritis and back pain.   Gastrointestinal: Negative for abdominal pain, constipation and diarrhea.   Genitourinary: Negative for dysuria and hematuria.   Neurological: Negative for dizziness and headaches.   Psychiatric/Behavioral: Negative for altered mental status.     Objective:     Vital Signs (Most Recent):    Vital Signs (24h Range):  BP: ()/()   Arterial Line BP: ()/()         There is no height or weight on file to calculate BMI.            No intake or output data in the 24 hours ending 12/04/20 0832    Lines/Drains/Airways     None                 Physical Exam   Constitutional: He is oriented to person, place, and time. He appears well-nourished. No distress.   HENT:   Head: Normocephalic.   Eyes: Pupils  are equal, round, and reactive to light.   Neck: No JVD present. No thyromegaly present.   Cardiovascular: Normal rate. Exam reveals no friction rub.   No murmur heard.  Pulmonary/Chest: Effort normal. No respiratory distress.   Abdominal: Soft. He exhibits no distension.   Musculoskeletal:         General: No edema.   Neurological: He is alert and oriented to person, place, and time.   Vitals reviewed.      Significant Labs: All pertinent lab results from the last 24 hours have been reviewed.    Significant Imaging: Echocardiogram:   2D echo with color flow doppler: No results found for this or any previous visit. and Transthoracic echo (TTE) complete (Cupid Only):   Results for orders placed or performed during the hospital encounter of 11/16/20   Echo Color Flow Doppler? Yes; Bubble Contrast? Yes   Result Value Ref Range    BSA 2.02 m2    TDI SEPTAL 0.09 m/s    LV LATERAL E/E' RATIO 4.60 m/s    LV SEPTAL E/E' RATIO 5.11 m/s    LA WIDTH 4.04 cm    TDI LATERAL 0.10 m/s    LVIDd 4.30 3.5 - 6.0 cm    IVS 1.10 (A) 0.6 - 1.1 cm    Posterior Wall 1.00 0.6 - 1.1 cm    LVIDs 3.21 2.1 - 4.0 cm    FS 25 28 - 44 %    LA volume 62.04 cm3    Sinus 3.90 cm    STJ 3.20 cm    Ascending aorta 3.59 cm    LV mass 152.55 g    LA size 3.92 cm    RVDD 4.27 cm    TAPSE 2.66 cm    RV S' 13.19 cm/s    Left Ventricle Relative Wall Thickness 0.47 cm    AV mean gradient 3 mmHg    AV valve area 4.07 cm2    AV Velocity Ratio 0.96     AV index (prosthetic) 0.93     MV valve area p 1/2 method 2.79 cm2    E/A ratio 0.88     Mean e' 0.10 m/s    E wave decelartion time 271.46 msec    Pulm vein S/D ratio 1.03     LVOT diameter 2.36 cm    LVOT area 4.4 cm2    LVOT peak chai 1.16 m/s    LVOT peak VTI 24.98 cm    Ao peak chai 1.21 m/s    Ao VTI 26.85 cm    LVOT stroke volume 109.22 cm3    AV peak gradient 6 mmHg    E/E' ratio 4.84 m/s    MV Peak E Chai 0.46 m/s    TR Max Chai 1.94 m/s    MV stenosis pressure 1/2 time 78.72 ms    MV Peak A Chai 0.52 m/s     PV Peak S Chai 0.40 m/s    PV Peak D Chai 0.39 m/s    LV Systolic Volume 41.26 mL    LV Systolic Volume Index 20.6 mL/m2    LV Diastolic Volume 72.37 mL    LV Diastolic Volume Index 36.09 mL/m2    LA Volume Index 30.9 mL/m2    LV Mass Index 76 g/m2    RA Major Axis 4.79 cm    Left Atrium Minor Axis 4.69 cm    Left Atrium Major Axis 4.53 cm    Triscuspid Valve Regurgitation Peak Gradient 15 mmHg    RA Width 3.98 cm    Right Atrial Pressure (from IVC) 3 mmHg    TV rest pulmonary artery pressure 18 mmHg    Narrative    · The left ventricle is normal in size with normal systolic function. The   estimated ejection fraction is 60%.  · There is left ventricular concentric remodeling.  · Normal left ventricular diastolic function.  · Mild right ventricular enlargement with normal right ventricular   systolic function.  · The estimated PA systolic pressure is 18 mmHg.  · Normal central venous pressure (3 mmHg).  · The ascending aorta is mildly dilated at 3.9cm. The aorta is not as well   seen as the prior study, which demonstrated a 4.4cm proximal ascending   aorta.  · S/p 35mm Amplatzer PFO occluder. Study is positive for a right to left   shunt (image 113). Bubbles cross the interatrial septum within 3 cardiac   cycles. Color flow doppler also positive for shunt.

## 2020-12-04 NOTE — PROGRESS NOTES
Patient admitted to recovery see [facility] for complete assessment pacu bcg's maintained safety measures verified patient instructed on pain scale and patient verbalized understanding. Called patient's wife and updated on patient location with the permission of osvaldo.

## 2020-12-04 NOTE — PROGRESS NOTES
Patient discharged per MD orders. Instructions given on medications, activity, when to call MD, and follow up appointments. Pt verbalized understanding.  Patient AAOx4, VSS, no c/o pain or discomfort at this time. PIV removed. Cesario CANA[place] stated he will call spouse and update. Patient denied need for transport and ambulated off unit with spouse.

## 2020-12-04 NOTE — HPI
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 At the time of PFO closure, there was evidence of immediate right-to-left shunting after PFO closure by intracardiac echo with bubble study. However, TTE with Bubble on 6/2020 and 11/2020 showed right to left shunt across the interatrial septum. Shunting occurs in two cardiac cycles. Color flow doppler is also positive for shunt. He presented today for elective repeat REDD to evaluate for closure of the residual shunt.     Card Meds: Aspirin 81 mg, Lipitor and Flomax     TTE 11/2020   · The left ventricle is normal in size with normal systolic function. The estimated ejection fraction is 60%.  · There is left ventricular concentric remodeling.  · Normal left ventricular diastolic function. · Mild right ventricular enlargement with normal right ventricular systolic function.  · The estimated PA systolic pressure is 18 mmHg. · Normal central venous pressure (3 mmHg).  · The ascending aorta is mildly dilated at 3.9cm. The aorta is not as well seen as the prior study, which demonstrated a 4.4cm proximal ascending aorta.  · S/p 35mm Amplatzer PFO occluder. Study is positive for a right to left shunt (image 113). Bubbles cross the interatrial septum within 3 cardiac cycles. Color flow doppler also positive for shunt.    REDD 11/29/2019   · Patent foramen ovale present with right to left shunting indicated by color flow Doppler and saline contrast. Bubbles immediately cross on the first beat. Findings consistent with moderate-sized PFO.  · Atrial septal aneurysm present which bulges to the left and protrudes approximately 0.6 cm ( producing small hole) beyond the plane of the atrial septum. Lip measures 1.2 cm.

## 2020-12-04 NOTE — TRANSFER OF CARE
"Anesthesia Transfer of Care Note    Patient: Gt Banerjee    Procedure(s) Performed: Procedure(s) (LRB):  ECHOCARDIOGRAM,TRANSESOPHAGEAL (N/A)    Patient location: [facility]    Anesthesia Type: general    Transport from OR: Transported from OR on 6-10 L/min O2 by face mask with adequate spontaneous ventilation    Post pain: adequate analgesia    Post assessment: no apparent anesthetic complications and tolerated procedure well    Post vital signs: stable    Level of consciousness: lethargic and responds to stimulation    Nausea/Vomiting: no nausea/vomiting    Complications: none    Transfer of care protocol was followed      Last vitals:   Visit Vitals  /80 (BP Location: Right arm, Patient Position: Lying)   Pulse (!) 53   Temp 36.8 °C (98.2 °F)   Resp 16   Ht 5' 10" (1.778 m)   Wt 83.5 kg (184 lb)   SpO2 97%   BMI 26.40 kg/m²     "

## 2020-12-04 NOTE — ASSESSMENT & PLAN NOTE
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 At the time of PFO closure, there was evidence of immediate right-to-left shunting after PFO closure by intracardiac echo with bubble study. However, TTE with Bubble on 6/2020 and 11/2020 showed right to left shunt across the interatrial septum. Shunting occurs in two cardiac cycles. Color flow doppler is also positive for shunt. He presented today for elective repeat REDD to evaluate for closure of the residual shunt.     Card Meds: Aspirin 81 mg, Lipitor and Flomax     TTE 11/2020   · The left ventricle is normal in size with normal systolic function. The estimated ejection fraction is 60%.  · There is left ventricular concentric remodeling.  · Normal left ventricular diastolic function. · Mild right ventricular enlargement with normal right ventricular systolic function.  · The estimated PA systolic pressure is 18 mmHg. · Normal central venous pressure (3 mmHg).  · The ascending aorta is mildly dilated at 3.9cm. The aorta is not as well seen as the prior study, which demonstrated a 4.4cm proximal ascending aorta.  · S/p 35mm Amplatzer PFO occluder. Study is positive for a right to left shunt (image 113). Bubbles cross the interatrial septum within 3 cardiac cycles. Color flow doppler also positive for shunt.      Dysphagia or odynophagia:  No  Liver Disease, esophageal disease, or known varices:  No  Upper GI Bleeding:No  Snoring:  Yes  Sleep Apnea:  No  Last oral intake: 12 hours ago  Able to move neck in all directions:  Yes    REDD for evaluation of /P patent foramen ovale closure with a 35 mm Amplatzer PFO occluder.  -No absolute contraindications of esophageal stricture, tumor, perforation, laceration,or diverticulum and/or active GI bleed  -The risks, benefits & alternatives of the procedure were explained to the patient.   -The risks of transesophageal echo include  but are not limited to:  Dental trauma, esophageal trauma/perforation, bleeding, laryngospasm/brochospasm, aspiration, sore throat/hoarseness, & dislodgement of the endotracheal tube/nasogastric tube (where applicable).    -The risks of moderate sedation include hypotension, respiratory depression, arrhythmias, bronchospasm, & death.    -Informed consent was obtained. The patient is agreeable to proceed with the procedure and all questions and concerns addressed.

## 2020-12-04 NOTE — ANESTHESIA PREPROCEDURE EVALUATION
12/04/2020  Pre-operative evaluation for Procedure(s) (LRB):  ECHOCARDIOGRAM,TRANSESOPHAGEAL (N/A)    Gt Banerjee is a 60 y.o. male hx of PFO closure s/p stroke with no residual deficits    Patient Active Problem List   Diagnosis    Borderline systolic HTN    Benign prostatic hyperplasia with urinary frequency    History of ischemic vertebrobasilar artery brainstem stroke    PFO (patent foramen ovale)    Cryptogenic stroke    S/P patent foramen ovale closure       Review of patient's allergies indicates:  No Known Allergies    No current facility-administered medications on file prior to encounter.      Current Outpatient Medications on File Prior to Encounter   Medication Sig Dispense Refill    aspirin (ECOTRIN) 81 MG EC tablet Take 81 mg by mouth once daily.         Past Surgical History:   Procedure Laterality Date    VASECTOMY         Social History     Socioeconomic History    Marital status:      Spouse name: Not on file    Number of children: Not on file    Years of education: Not on file    Highest education level: Not on file   Occupational History    Not on file   Social Needs    Financial resource strain: Not on file    Food insecurity     Worry: Not on file     Inability: Not on file    Transportation needs     Medical: Not on file     Non-medical: Not on file   Tobacco Use    Smoking status: Never Smoker    Smokeless tobacco: Never Used   Substance and Sexual Activity    Alcohol use: Yes     Alcohol/week: 1.0 standard drinks     Types: 1 Shots of liquor per week     Frequency: Monthly or less     Comment: moderate    Drug use: Never    Sexual activity: Not on file   Lifestyle    Physical activity     Days per week: Not on file     Minutes per session: Not on file    Stress: Not on file   Relationships    Social connections     Talks on phone: Not on file     Gets  together: Not on file     Attends Mandaen service: Not on file     Active member of club or organization: Not on file     Attends meetings of clubs or organizations: Not on file     Relationship status: Not on file   Other Topics Concern    Not on file   Social History Narrative    Not on file         CBC: No results for input(s): WBC, RBC, HGB, HCT, PLT, MCV, MCH, MCHC in the last 72 hours.    CMP: No results for input(s): NA, K, CL, CO2, BUN, CREATININE, GLU, MG, PHOS, CALCIUM, ALBUMIN, PROT, ALKPHOS, ALT, AST, BILITOT in the last 72 hours.    INR  No results for input(s): PT, INR, PROTIME, APTT in the last 72 hours.        Diagnostic Studies:      EKD Echo:  No results found for this or any previous visit.      Anesthesia Evaluation    I have reviewed the Patient Summary Reports.    I have reviewed the Nursing Notes. I have reviewed the NPO Status.      Review of Systems  Anesthesia Hx:  No problems with previous Anesthesia  History of prior surgery of interest to airway management or planning: Denies Family Hx of Anesthesia complications.   Denies Personal Hx of Anesthesia complications.   Cardiovascular:   Exercise tolerance: good ECG has been reviewed.    Pulmonary:   Denies COPD.  Denies Asthma.  Denies Sleep Apnea.    Renal/:   Denies Chronic Renal Disease.     Hepatic/GI:   Denies GERD. Denies Liver Disease.    Neurological:   CVA, no residual symptoms    Endocrine:   Denies Diabetes.        Physical Exam  General:  Well nourished    Airway/Jaw/Neck:  Airway Findings: Mouth Opening: Normal Tongue: Normal  General Airway Assessment: Adult  Mallampati: II  Improves to I with phonation.  TM Distance: Normal, at least 6 cm  Jaw/Neck Findings:  Neck ROM: Normal ROM      Dental:  Dental Findings: In tact   Chest/Lungs:  Chest/Lungs Findings: Clear to auscultation, Normal Respiratory Rate     Heart/Vascular:  Heart Findings: Rate: Normal  Rhythm: Regular Rhythm  Sounds: Normal        Mental  Status:  Mental Status Findings:  Cooperative, Alert and Oriented         Anesthesia Plan  Type of Anesthesia, risks & benefits discussed:  Anesthesia Type:  general  Patient's Preference:   Intra-op Monitoring Plan: standard ASA monitors  Intra-op Monitoring Plan Comments:   Post Op Pain Control Plan: per primary service following discharge from PACU  Post Op Pain Control Plan Comments:   Induction:   IV  Beta Blocker:  Patient is not currently on a Beta-Blocker (No further documentation required).       Informed Consent: Patient understands risks and agrees with Anesthesia plan.  Questions answered. Anesthesia consent signed with patient.  ASA Score: 2     Day of Surgery Review of History & Physical:    H&P update referred to the provider.         Ready For Surgery From Anesthesia Perspective.

## 2020-12-07 ENCOUNTER — OFFICE VISIT (OUTPATIENT)
Dept: CARDIOLOGY | Facility: CLINIC | Age: 60
End: 2020-12-07
Payer: COMMERCIAL

## 2020-12-07 ENCOUNTER — TELEPHONE (OUTPATIENT)
Dept: CARDIOLOGY | Facility: CLINIC | Age: 60
End: 2020-12-07

## 2020-12-07 VITALS
HEART RATE: 53 BPM | OXYGEN SATURATION: 100 % | HEIGHT: 70 IN | SYSTOLIC BLOOD PRESSURE: 128 MMHG | BODY MASS INDEX: 27.01 KG/M2 | DIASTOLIC BLOOD PRESSURE: 65 MMHG | WEIGHT: 188.69 LBS

## 2020-12-07 DIAGNOSIS — Q21.12 PFO (PATENT FORAMEN OVALE): Primary | ICD-10-CM

## 2020-12-07 DIAGNOSIS — Q21.12 PFO (PATENT FORAMEN OVALE): ICD-10-CM

## 2020-12-07 DIAGNOSIS — I63.9 CRYPTOGENIC STROKE: ICD-10-CM

## 2020-12-07 DIAGNOSIS — Z87.74 S/P PATENT FORAMEN OVALE CLOSURE: ICD-10-CM

## 2020-12-07 PROCEDURE — 1126F PR PAIN SEVERITY QUANTIFIED, NO PAIN PRESENT: ICD-10-PCS | Mod: S$GLB,,, | Performed by: INTERNAL MEDICINE

## 2020-12-07 PROCEDURE — 1126F AMNT PAIN NOTED NONE PRSNT: CPT | Mod: S$GLB,,, | Performed by: INTERNAL MEDICINE

## 2020-12-07 PROCEDURE — 99214 OFFICE O/P EST MOD 30 MIN: CPT | Mod: S$GLB,,, | Performed by: INTERNAL MEDICINE

## 2020-12-07 PROCEDURE — 3008F BODY MASS INDEX DOCD: CPT | Mod: CPTII,S$GLB,, | Performed by: INTERNAL MEDICINE

## 2020-12-07 PROCEDURE — 3008F PR BODY MASS INDEX (BMI) DOCUMENTED: ICD-10-PCS | Mod: CPTII,S$GLB,, | Performed by: INTERNAL MEDICINE

## 2020-12-07 PROCEDURE — 99999 PR PBB SHADOW E&M-EST. PATIENT-LVL III: CPT | Mod: PBBFAC,,,

## 2020-12-07 PROCEDURE — 99999 PR PBB SHADOW E&M-EST. PATIENT-LVL III: ICD-10-PCS | Mod: PBBFAC,,,

## 2020-12-07 PROCEDURE — 99214 PR OFFICE/OUTPT VISIT, EST, LEVL IV, 30-39 MIN: ICD-10-PCS | Mod: S$GLB,,, | Performed by: INTERNAL MEDICINE

## 2020-12-07 RX ORDER — SODIUM CHLORIDE 9 MG/ML
INJECTION, SOLUTION INTRAVENOUS CONTINUOUS
Status: CANCELLED | OUTPATIENT
Start: 2020-12-07 | End: 2020-12-07

## 2020-12-07 RX ORDER — CLOPIDOGREL BISULFATE 75 MG/1
75 TABLET ORAL DAILY
Qty: 30 TABLET | Refills: 5 | Status: SHIPPED | OUTPATIENT
Start: 2020-12-07 | End: 2022-12-02

## 2020-12-07 RX ORDER — DIPHENHYDRAMINE HCL 25 MG
50 CAPSULE ORAL ONCE
Status: CANCELLED | OUTPATIENT
Start: 2020-12-07 | End: 2020-12-07

## 2020-12-07 NOTE — TELEPHONE ENCOUNTER
OUTPATIENT CATHETERIZATION INSTRUCTIONS    You have been scheduled for a procedure in the catheterization lab on Tuesday, December 15, 2020.     Please report to the Cardiology Waiting Area on the Third floor of the hospital and check in at 10 AM.   You will then be taken to the SSCU (Short Stay Cardiac Unit) and prepared for your procedure. Please be aware that this is not the time of your procedure but the time you are to arrive. The procedures are scheduled on an hourly basis; however, emergency cases take precedence over all other cases.       You may not have anything to eat or drink after midnight the night before your test. You may take your regular morning medications with water. If there are any medications that you should not take you will be instructed to hold them that morning. If you are diabetic and on Metformin (Glucophage) do not take it the day before, the day of, and for 2 days after your procedure.      The procedure will take 1-2 hours to perform. After the procedure, you will return to SSCU on the third floor of the hospital. You will need to lie still (or keep your arm still) for the next 4 to 6 hours to minimize bleeding from the puncture site. Your family may remain in the room with you during this time.       You may be able to be discharged home that same afternoon if there is someone to drive you home and there were no complications. If you have one of the balloon, stent, or device procedures you may spend the night in the hospital. Your doctor will determine, based on your progress, the date and time of your discharge. The results of your procedure will be discussed with you before you are discharged. Any further testing or procedures will be scheduled for you either before you leave or you will be called with these appointments.       If you should have any questions, concerns, or need to change the date of your procedure, please call  JULES Mccabe @ [phone]    Special  Instructions:  Start plavix 75 mg daily        THE ABOVE INSTRUCTIONS WERE GIVEN TO THE PATIENT VERBALLY AND THEY VERBALIZED UNDERSTANDING.  THEY DO NOT REQUIRE ANY SPECIAL NEEDS AND DO NOT HAVE ANY LEARNING BARRIERS.          Directions for Reporting to Cardiology Waiting Area in the Hospital  If you park in the Parking Garage:  Take elevators to the1st floor of the parking garage.  Continue past the gift shop, coffee shop, and piano.  Take a right and go to the gold elevators. (Elevator B)  Take the elevator to the 3rd floor.  Follow the arrow on the sign on the wall that says Cath Lab Registration/EP Lab Registration.  Follow the long hallway all the way around until you come to a big open area.  This is the registration area.  Check in at Reception Desk.    OR    If family is dropping you off:  Have them drop you off at the front of the Hospital under the green overhang.  Enter through the doors and take a right.  Take the E elevators to the 3rd floor Cardiology Waiting Area.  Check in at the Reception Desk in the waiting room.
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
## 2020-12-09 ENCOUNTER — PATIENT MESSAGE (OUTPATIENT)
Dept: MEDSURG UNIT | Facility: HOSPITAL | Age: 60
End: 2020-12-09

## 2020-12-10 ENCOUNTER — PATIENT MESSAGE (OUTPATIENT)
Dept: CARDIOLOGY | Facility: CLINIC | Age: 60
End: 2020-12-10

## 2020-12-10 ENCOUNTER — TELEPHONE (OUTPATIENT)
Dept: CARDIOLOGY | Facility: CLINIC | Age: 60
End: 2020-12-10

## 2020-12-10 NOTE — TELEPHONE ENCOUNTER
Patient sent an inbasket message to cancel his PFO closure on 12/15/20 with REDD Diallo, and Dr Menendez.  He will call back when he wants to reschedule.

## 2020-12-14 ENCOUNTER — PATIENT MESSAGE (OUTPATIENT)
Dept: CARDIOLOGY | Facility: CLINIC | Age: 60
End: 2020-12-14

## 2020-12-28 ENCOUNTER — EDUCATION (OUTPATIENT)
Dept: CARDIOLOGY | Facility: CLINIC | Age: 60
End: 2020-12-28

## 2020-12-28 DIAGNOSIS — Z01.812 PRE-PROCEDURE LAB EXAM: ICD-10-CM

## 2020-12-28 DIAGNOSIS — Q21.12 PFO (PATENT FORAMEN OVALE): Primary | ICD-10-CM

## 2020-12-28 DIAGNOSIS — I63.9 CRYPTOGENIC STROKE: ICD-10-CM

## 2020-12-28 RX ORDER — DIPHENHYDRAMINE HCL 25 MG
50 CAPSULE ORAL ONCE
Status: CANCELLED | OUTPATIENT
Start: 2020-12-28 | End: 2020-12-28

## 2020-12-28 RX ORDER — SODIUM CHLORIDE 9 MG/ML
INJECTION, SOLUTION INTRAVENOUS CONTINUOUS
Status: CANCELLED | OUTPATIENT
Start: 2020-12-28 | End: 2020-12-28

## 2020-12-28 NOTE — PROGRESS NOTES
"OUTPATIENT CATHETERIZATION INSTRUCTIONS    You have been scheduled for a procedure in the catheterization lab on Wednesday, February 17, 2021.     Please report to the Cardiology Waiting Area on the Third floor of the hospital and check in at 6 AM.   You will then be taken to the SSCU (Short Stay Cardiac Unit) and prepared for your procedure. Please be aware that this is not the time of your procedure but the time you are to arrive. The procedures are scheduled on an hourly basis; however, emergency cases take precedence over all other cases.       You may not have anything to eat or drink after midnight the night before your test. You may take your regular morning medications with water. If there are any medications that you should not take you will be instructed to hold them that morning. If you are diabetic and on Metformin (Glucophage) do not take it the day before, the day of, and for 2 days after your procedure.      The procedure will take 1-2 hours to perform. After the procedure, you will return to SSCU on the third floor of the hospital. You will need to lie still (or keep your arm still) for the next 4 to 6 hours to minimize bleeding from the puncture site. Your family may remain in the room with you during this time.       You may be able to be discharged home that same afternoon if there is someone to drive you home and there were no complications. If you have one of the balloon, stent, or device procedures you may spend the night in the hospital. Your doctor will determine, based on your progress, the date and time of your discharge. The results of your procedure will be discussed with you before you are discharged. Any further testing or procedures will be scheduled for you either before you leave or you will be called with these appointments.       If you should have any questions, concerns, or need to change the date of your procedure, please call JULES Barron @ [phone]",    Special " Instructions:          THE ABOVE INSTRUCTIONS WERE GIVEN TO THE PATIENT VERBALLY AND THEY VERBALIZED UNDERSTANDING.  THEY DO NOT REQUIRE ANY SPECIAL NEEDS AND DO NOT HAVE ANY LEARNING BARRIERS.          Directions for Reporting to Cardiology Waiting Area in the Hospital  If you park in the Parking Garage:  Take elevators to the1st floor of the parking garage.  Continue past the gift shop, coffee shop, and piano.  Take a right and go to the gold elevators. (Elevator B)  Take the elevator to the 3rd floor.  Follow the arrow on the sign on the wall that says Cath Lab Registration/EP Lab Registration.  Follow the long hallway all the way around until you come to a big open area.  This is the registration area.  Check in at Reception Desk.    OR    If family is dropping you off:  Have them drop you off at the front of the Hospital under the green overhang.  Enter through the doors and take a right.  Take the E elevators to the 3rd floor Cardiology Waiting Area.  Check in at the Reception Desk in the waiting room.

## 2021-02-02 ENCOUNTER — PATIENT MESSAGE (OUTPATIENT)
Dept: MEDSURG UNIT | Facility: HOSPITAL | Age: 61
End: 2021-02-02

## 2021-02-11 DIAGNOSIS — Z71.9 HEALTH EDUCATION/COUNSELING: Primary | ICD-10-CM

## 2021-02-15 ENCOUNTER — LAB VISIT (OUTPATIENT)
Dept: INTERNAL MEDICINE | Facility: CLINIC | Age: 61
End: 2021-02-15
Payer: COMMERCIAL

## 2021-02-15 DIAGNOSIS — Z01.812 PRE-PROCEDURE LAB EXAM: ICD-10-CM

## 2021-02-15 PROCEDURE — U0003 INFECTIOUS AGENT DETECTION BY NUCLEIC ACID (DNA OR RNA); SEVERE ACUTE RESPIRATORY SYNDROME CORONAVIRUS 2 (SARS-COV-2) (CORONAVIRUS DISEASE [COVID-19]), AMPLIFIED PROBE TECHNIQUE, MAKING USE OF HIGH THROUGHPUT TECHNOLOGIES AS DESCRIBED BY CMS-2020-01-R: HCPCS

## 2021-02-15 PROCEDURE — U0005 INFEC AGEN DETEC AMPLI PROBE: HCPCS

## 2021-02-16 LAB — SARS-COV-2 RNA RESP QL NAA+PROBE: NOT DETECTED

## 2021-02-17 ENCOUNTER — HOSPITAL ENCOUNTER (OUTPATIENT)
Facility: HOSPITAL | Age: 61
Discharge: HOME | End: 2021-02-17
Attending: INTERNAL MEDICINE | Admitting: INTERNAL MEDICINE
Payer: COMMERCIAL

## 2021-02-17 ENCOUNTER — ANESTHESIA EVENT (OUTPATIENT)
Dept: MEDSURG UNIT | Facility: HOSPITAL | Age: 61
End: 2021-02-17
Payer: COMMERCIAL

## 2021-02-17 ENCOUNTER — HOSPITAL ENCOUNTER (OUTPATIENT)
Dept: CARDIOLOGY | Facility: HOSPITAL | Age: 61
Discharge: HOME | End: 2021-02-17
Attending: INTERNAL MEDICINE | Admitting: INTERNAL MEDICINE
Payer: COMMERCIAL

## 2021-02-17 ENCOUNTER — ANESTHESIA (OUTPATIENT)
Dept: MEDSURG UNIT | Facility: HOSPITAL | Age: 61
End: 2021-02-17
Payer: COMMERCIAL

## 2021-02-17 VITALS
HEIGHT: 70 IN | SYSTOLIC BLOOD PRESSURE: 122 MMHG | BODY MASS INDEX: 26.05 KG/M2 | TEMPERATURE: 98 F | RESPIRATION RATE: 18 BRPM | HEART RATE: 62 BPM | OXYGEN SATURATION: 100 % | WEIGHT: 182 LBS | DIASTOLIC BLOOD PRESSURE: 67 MMHG

## 2021-02-17 VITALS
DIASTOLIC BLOOD PRESSURE: 81 MMHG | HEIGHT: 70 IN | SYSTOLIC BLOOD PRESSURE: 126 MMHG | WEIGHT: 182 LBS | BODY MASS INDEX: 26.05 KG/M2

## 2021-02-17 DIAGNOSIS — Q21.12 PFO (PATENT FORAMEN OVALE): ICD-10-CM

## 2021-02-17 DIAGNOSIS — Q21.12 PFO (PATENT FORAMEN OVALE): Primary | ICD-10-CM

## 2021-02-17 DIAGNOSIS — I63.9 CRYPTOGENIC STROKE: ICD-10-CM

## 2021-02-17 LAB
ABO + RH BLD: NORMAL
ANION GAP SERPL CALC-SCNC: 6 MMOL/L (ref 8–16)
APTT BLDCRRT: 24.1 SEC (ref 21–32)
BLD GP AB SCN CELLS X3 SERPL QL: NORMAL
BSA FOR ECHO PROCEDURE: 2.02 M2
BUN SERPL-MCNC: 22 MG/DL (ref 8–23)
CALCIUM SERPL-MCNC: 9.1 MG/DL (ref 8.7–10.5)
CHLORIDE SERPL-SCNC: 104 MMOL/L (ref 95–110)
CO2 SERPL-SCNC: 27 MMOL/L (ref 23–29)
CREAT SERPL-MCNC: 1 MG/DL (ref 0.5–1.4)
ERYTHROCYTE [DISTWIDTH] IN BLOOD BY AUTOMATED COUNT: 11.8 % (ref 11.5–14.5)
EST. GFR  (AFRICAN AMERICAN): >60 ML/MIN/1.73 M^2
EST. GFR  (NON AFRICAN AMERICAN): >60 ML/MIN/1.73 M^2
GLUCOSE SERPL-MCNC: 101 MG/DL (ref 70–110)
HCT VFR BLD AUTO: 45 % (ref 40–54)
HGB BLD-MCNC: 15.3 G/DL (ref 14–18)
INR PPP: 1 (ref 0.8–1.2)
MCH RBC QN AUTO: 33.2 PG (ref 27–31)
MCHC RBC AUTO-ENTMCNC: 34 G/DL (ref 32–36)
MCV RBC AUTO: 98 FL (ref 82–98)
PLATELET # BLD AUTO: 194 K/UL (ref 150–350)
PMV BLD AUTO: 10.5 FL (ref 9.2–12.9)
POTASSIUM SERPL-SCNC: 3.9 MMOL/L (ref 3.5–5.1)
PROTHROMBIN TIME: 10.7 SEC (ref 9–12.5)
RBC # BLD AUTO: 4.61 M/UL (ref 4.6–6.2)
SODIUM SERPL-SCNC: 137 MMOL/L (ref 136–145)
WBC # BLD AUTO: 4.16 K/UL (ref 3.9–12.7)

## 2021-02-17 PROCEDURE — 85027 COMPLETE CBC AUTOMATED: CPT

## 2021-02-17 PROCEDURE — 63600175 PHARM REV CODE 636 W HCPCS: Performed by: NURSE ANESTHETIST, CERTIFIED REGISTERED

## 2021-02-17 PROCEDURE — 25000003 PHARM REV CODE 250: Performed by: INTERNAL MEDICINE

## 2021-02-17 PROCEDURE — 93355 ECHO TRANSESOPHAGEAL (TEE): CPT

## 2021-02-17 PROCEDURE — 85610 PROTHROMBIN TIME: CPT

## 2021-02-17 PROCEDURE — 93355 ECHO TRANSESOPHAGEAL (TEE): CPT | Mod: ,,, | Performed by: INTERNAL MEDICINE

## 2021-02-17 PROCEDURE — 85730 THROMBOPLASTIN TIME PARTIAL: CPT

## 2021-02-17 PROCEDURE — D9220A PRA ANESTHESIA: ICD-10-PCS | Mod: CRNA,,, | Performed by: NURSE ANESTHETIST, CERTIFIED REGISTERED

## 2021-02-17 PROCEDURE — C1887 CATHETER, GUIDING: HCPCS | Performed by: INTERNAL MEDICINE

## 2021-02-17 PROCEDURE — 93355 TRANSESOPHAGEAL ECHO (TEE) (CUPID ONLY): ICD-10-PCS | Mod: ,,, | Performed by: INTERNAL MEDICINE

## 2021-02-17 PROCEDURE — 80048 BASIC METABOLIC PNL TOTAL CA: CPT

## 2021-02-17 PROCEDURE — D9220A PRA ANESTHESIA: Mod: ANES,,, | Performed by: ANESTHESIOLOGY

## 2021-02-17 PROCEDURE — D9220A PRA ANESTHESIA: ICD-10-PCS | Mod: ANES,,, | Performed by: ANESTHESIOLOGY

## 2021-02-17 PROCEDURE — C1769 GUIDE WIRE: HCPCS | Performed by: INTERNAL MEDICINE

## 2021-02-17 PROCEDURE — 93005 ELECTROCARDIOGRAM TRACING: CPT | Mod: 59

## 2021-02-17 PROCEDURE — 93005 ELECTROCARDIOGRAM TRACING: CPT

## 2021-02-17 PROCEDURE — 37000008 HC ANESTHESIA 1ST 15 MINUTES: Performed by: INTERNAL MEDICINE

## 2021-02-17 PROCEDURE — 37000009 HC ANESTHESIA EA ADD 15 MINS: Performed by: INTERNAL MEDICINE

## 2021-02-17 PROCEDURE — D9220A PRA ANESTHESIA: Mod: CRNA,,, | Performed by: NURSE ANESTHETIST, CERTIFIED REGISTERED

## 2021-02-17 PROCEDURE — 93010 ELECTROCARDIOGRAM REPORT: CPT | Mod: ,,, | Performed by: INTERNAL MEDICINE

## 2021-02-17 PROCEDURE — 93010 EKG 12-LEAD: ICD-10-PCS | Mod: ,,, | Performed by: INTERNAL MEDICINE

## 2021-02-17 PROCEDURE — 93580 TRANSCATH CLOSURE OF ASD: CPT | Performed by: INTERNAL MEDICINE

## 2021-02-17 PROCEDURE — 86900 BLOOD TYPING SEROLOGIC ABO: CPT

## 2021-02-17 PROCEDURE — 25000003 PHARM REV CODE 250: Performed by: STUDENT IN AN ORGANIZED HEALTH CARE EDUCATION/TRAINING PROGRAM

## 2021-02-17 PROCEDURE — C1894 INTRO/SHEATH, NON-LASER: HCPCS | Performed by: INTERNAL MEDICINE

## 2021-02-17 PROCEDURE — C1817 SEPTAL DEFECT IMP SYS: HCPCS | Performed by: INTERNAL MEDICINE

## 2021-02-17 PROCEDURE — 25000003 PHARM REV CODE 250: Performed by: NURSE ANESTHETIST, CERTIFIED REGISTERED

## 2021-02-17 PROCEDURE — 93580 PR PERC CLOS,CONG INTERATRIAL COMMUN W/IMPL: ICD-10-PCS | Mod: ,,, | Performed by: INTERNAL MEDICINE

## 2021-02-17 PROCEDURE — 93580 TRANSCATH CLOSURE OF ASD: CPT | Mod: ,,, | Performed by: INTERNAL MEDICINE

## 2021-02-17 DEVICE — MULTIFENESTRATED SEPTAL OCCLUDER CRIBRIFORM
Type: IMPLANTABLE DEVICE | Site: HEART | Status: FUNCTIONAL
Brand: AMPLATZER™

## 2021-02-17 RX ORDER — BIVALIRUDIN 250 MG/5ML
INJECTION, POWDER, LYOPHILIZED, FOR SOLUTION INTRAVENOUS
Status: DISCONTINUED | OUTPATIENT
Start: 2021-02-17 | End: 2021-02-17

## 2021-02-17 RX ORDER — SODIUM CHLORIDE 9 MG/ML
INJECTION, SOLUTION INTRAVENOUS CONTINUOUS
Status: ACTIVE | OUTPATIENT
Start: 2021-02-17 | End: 2021-02-17

## 2021-02-17 RX ORDER — ACETAMINOPHEN 325 MG/1
650 TABLET ORAL EVERY 4 HOURS PRN
Status: DISCONTINUED | OUTPATIENT
Start: 2021-02-17 | End: 2021-02-17 | Stop reason: HOSPADM

## 2021-02-17 RX ORDER — CEFAZOLIN SODIUM 1 G/3ML
INJECTION, POWDER, FOR SOLUTION INTRAMUSCULAR; INTRAVENOUS
Status: DISCONTINUED | OUTPATIENT
Start: 2021-02-17 | End: 2021-02-17

## 2021-02-17 RX ORDER — SODIUM CHLORIDE 0.9 % (FLUSH) 0.9 %
10 SYRINGE (ML) INJECTION
Status: DISCONTINUED | OUTPATIENT
Start: 2021-02-17 | End: 2021-02-17 | Stop reason: HOSPADM

## 2021-02-17 RX ORDER — ONDANSETRON 8 MG/1
8 TABLET, ORALLY DISINTEGRATING ORAL EVERY 8 HOURS PRN
Status: DISCONTINUED | OUTPATIENT
Start: 2021-02-17 | End: 2021-02-17 | Stop reason: HOSPADM

## 2021-02-17 RX ORDER — HALOPERIDOL 5 MG/ML
0.5 INJECTION INTRAMUSCULAR EVERY 10 MIN PRN
Status: DISCONTINUED | OUTPATIENT
Start: 2021-02-17 | End: 2021-02-17 | Stop reason: HOSPADM

## 2021-02-17 RX ORDER — HEPARIN SOD,PORCINE/0.9 % NACL 1000/500ML
INTRAVENOUS SOLUTION INTRAVENOUS
Status: DISCONTINUED | OUTPATIENT
Start: 2021-02-17 | End: 2021-02-17 | Stop reason: HOSPADM

## 2021-02-17 RX ORDER — NEOSTIGMINE METHYLSULFATE 0.5 MG/ML
INJECTION, SOLUTION INTRAVENOUS
Status: DISCONTINUED | OUTPATIENT
Start: 2021-02-17 | End: 2021-02-17

## 2021-02-17 RX ORDER — LIDOCAINE HYDROCHLORIDE 20 MG/ML
INJECTION, SOLUTION EPIDURAL; INFILTRATION; INTRACAUDAL; PERINEURAL
Status: DISCONTINUED | OUTPATIENT
Start: 2021-02-17 | End: 2021-02-17

## 2021-02-17 RX ORDER — LIDOCAINE HYDROCHLORIDE 20 MG/ML
INJECTION, SOLUTION EPIDURAL; INFILTRATION; INTRACAUDAL; PERINEURAL
Status: DISCONTINUED | OUTPATIENT
Start: 2021-02-17 | End: 2021-02-17 | Stop reason: HOSPADM

## 2021-02-17 RX ORDER — ROCURONIUM BROMIDE 10 MG/ML
INJECTION, SOLUTION INTRAVENOUS
Status: DISCONTINUED | OUTPATIENT
Start: 2021-02-17 | End: 2021-02-17

## 2021-02-17 RX ORDER — PHENYLEPHRINE HCL IN 0.9% NACL 1 MG/10 ML
SYRINGE (ML) INTRAVENOUS
Status: DISCONTINUED | OUTPATIENT
Start: 2021-02-17 | End: 2021-02-17

## 2021-02-17 RX ORDER — DIPHENHYDRAMINE HCL 50 MG
50 CAPSULE ORAL ONCE
Status: COMPLETED | OUTPATIENT
Start: 2021-02-17 | End: 2021-02-17

## 2021-02-17 RX ORDER — FENTANYL CITRATE 50 UG/ML
25 INJECTION, SOLUTION INTRAMUSCULAR; INTRAVENOUS EVERY 5 MIN PRN
Status: DISCONTINUED | OUTPATIENT
Start: 2021-02-17 | End: 2021-02-17 | Stop reason: HOSPADM

## 2021-02-17 RX ORDER — MIDAZOLAM HYDROCHLORIDE 1 MG/ML
INJECTION, SOLUTION INTRAMUSCULAR; INTRAVENOUS
Status: DISCONTINUED | OUTPATIENT
Start: 2021-02-17 | End: 2021-02-17

## 2021-02-17 RX ORDER — ONDANSETRON 2 MG/ML
INJECTION INTRAMUSCULAR; INTRAVENOUS
Status: DISCONTINUED | OUTPATIENT
Start: 2021-02-17 | End: 2021-02-17

## 2021-02-17 RX ORDER — FENTANYL CITRATE 50 UG/ML
INJECTION, SOLUTION INTRAMUSCULAR; INTRAVENOUS
Status: DISCONTINUED | OUTPATIENT
Start: 2021-02-17 | End: 2021-02-17

## 2021-02-17 RX ORDER — PROPOFOL 10 MG/ML
VIAL (ML) INTRAVENOUS
Status: DISCONTINUED | OUTPATIENT
Start: 2021-02-17 | End: 2021-02-17

## 2021-02-17 RX ADMIN — ONDANSETRON 4 MG: 2 INJECTION, SOLUTION INTRAMUSCULAR; INTRAVENOUS at 10:02

## 2021-02-17 RX ADMIN — GLYCOPYRROLATE 0.4 MCG: 0.2 INJECTION, SOLUTION INTRAMUSCULAR; INTRAVITREAL at 09:02

## 2021-02-17 RX ADMIN — ROCURONIUM BROMIDE 10 MG: 10 INJECTION, SOLUTION INTRAVENOUS at 09:02

## 2021-02-17 RX ADMIN — BIVALIRUDIN 61.95 MG: 250 INJECTION, POWDER, LYOPHILIZED, FOR SOLUTION INTRAVENOUS at 08:02

## 2021-02-17 RX ADMIN — SODIUM CHLORIDE 125 ML/HR: 0.9 INJECTION, SOLUTION INTRAVENOUS at 10:02

## 2021-02-17 RX ADMIN — ROCURONIUM BROMIDE 40 MG: 10 INJECTION, SOLUTION INTRAVENOUS at 08:02

## 2021-02-17 RX ADMIN — PROPOFOL 200 MG: 10 INJECTION, EMULSION INTRAVENOUS at 08:02

## 2021-02-17 RX ADMIN — CEFAZOLIN 2 G: 330 INJECTION, POWDER, FOR SOLUTION INTRAMUSCULAR; INTRAVENOUS at 08:02

## 2021-02-17 RX ADMIN — Medication 100 MCG: at 09:02

## 2021-02-17 RX ADMIN — LIDOCAINE HYDROCHLORIDE 100 MG: 20 INJECTION, SOLUTION EPIDURAL; INFILTRATION; INTRACAUDAL at 08:02

## 2021-02-17 RX ADMIN — DIPHENHYDRAMINE HYDROCHLORIDE 50 MG: 50 CAPSULE ORAL at 06:02

## 2021-02-17 RX ADMIN — MIDAZOLAM 2 MG: 1 INJECTION INTRAMUSCULAR; INTRAVENOUS at 08:02

## 2021-02-17 RX ADMIN — NEOSTIGMINE METHYLSULFATE 4 MG: 0.5 INJECTION INTRAVENOUS at 09:02

## 2021-02-17 RX ADMIN — SODIUM CHLORIDE: 0.9 INJECTION, SOLUTION INTRAVENOUS at 06:02

## 2021-02-17 RX ADMIN — FENTANYL CITRATE 100 MCG: 50 INJECTION, SOLUTION INTRAMUSCULAR; INTRAVENOUS at 08:02

## 2021-02-18 ENCOUNTER — LAB VISIT (OUTPATIENT)
Dept: LAB | Facility: HOSPITAL | Age: 61
End: 2021-02-18
Attending: PSYCHIATRY & NEUROLOGY

## 2021-02-18 DIAGNOSIS — Z71.9 HEALTH EDUCATION/COUNSELING: ICD-10-CM

## 2021-02-18 DIAGNOSIS — Z94.1 S/P ORTHOTOPIC HEART TRANSPLANT: ICD-10-CM

## 2021-02-18 LAB
ERYTHROCYTE [DISTWIDTH] IN BLOOD BY AUTOMATED COUNT: 11.7 % (ref 11.5–14.5)
ERYTHROCYTE [SEDIMENTATION RATE] IN BLOOD BY WESTERGREN METHOD: <2 MM/HR (ref 0–23)
HCT VFR BLD AUTO: 41.4 % (ref 40–54)
HGB BLD-MCNC: 14.1 G/DL (ref 14–18)
MCH RBC QN AUTO: 33.4 PG (ref 27–31)
MCHC RBC AUTO-ENTMCNC: 34.1 G/DL (ref 32–36)
MCV RBC AUTO: 98 FL (ref 82–98)
PLATELET # BLD AUTO: 177 K/UL (ref 150–350)
PMV BLD AUTO: 10.9 FL (ref 9.2–12.9)
RBC # BLD AUTO: 4.22 M/UL (ref 4.6–6.2)
WBC # BLD AUTO: 5.58 K/UL (ref 3.9–12.7)

## 2021-02-18 PROCEDURE — 85652 RBC SED RATE AUTOMATED: CPT

## 2021-02-18 PROCEDURE — 80053 COMPREHEN METABOLIC PANEL: CPT

## 2021-02-18 PROCEDURE — 85027 COMPLETE CBC AUTOMATED: CPT

## 2021-02-18 PROCEDURE — 82746 ASSAY OF FOLIC ACID SERUM: CPT

## 2021-02-18 PROCEDURE — 86141 C-REACTIVE PROTEIN HS: CPT

## 2021-02-18 PROCEDURE — 80061 LIPID PANEL: CPT

## 2021-02-18 PROCEDURE — 82607 VITAMIN B-12: CPT

## 2021-02-18 PROCEDURE — 82977 ASSAY OF GGT: CPT

## 2021-02-18 PROCEDURE — 36415 COLL VENOUS BLD VENIPUNCTURE: CPT

## 2021-02-19 LAB
ALBUMIN SERPL BCP-MCNC: 4.5 G/DL (ref 3.5–5.2)
ALP SERPL-CCNC: 62 U/L (ref 55–135)
ALT SERPL W/O P-5'-P-CCNC: 28 U/L (ref 10–44)
ANION GAP SERPL CALC-SCNC: 9 MMOL/L (ref 8–16)
AST SERPL-CCNC: 26 U/L (ref 10–40)
BILIRUB SERPL-MCNC: 1 MG/DL (ref 0.1–1)
BUN SERPL-MCNC: 20 MG/DL (ref 8–23)
CALCIUM SERPL-MCNC: 8.9 MG/DL (ref 8.7–10.5)
CHLORIDE SERPL-SCNC: 103 MMOL/L (ref 95–110)
CHOLEST SERPL-MCNC: 150 MG/DL (ref 120–199)
CHOLEST/HDLC SERPL: 2.8 {RATIO} (ref 2–5)
CO2 SERPL-SCNC: 26 MMOL/L (ref 23–29)
CREAT SERPL-MCNC: 0.9 MG/DL (ref 0.5–1.4)
CRP SERPL-MCNC: 3.2 MG/L (ref 0–3.19)
EST. GFR  (AFRICAN AMERICAN): >60 ML/MIN/1.73 M^2
EST. GFR  (NON AFRICAN AMERICAN): >60 ML/MIN/1.73 M^2
FOLATE SERPL-MCNC: 12.5 NG/ML (ref 4–24)
GGT SERPL-CCNC: 22 U/L (ref 8–55)
GLUCOSE SERPL-MCNC: 93 MG/DL (ref 70–110)
HDLC SERPL-MCNC: 54 MG/DL (ref 40–75)
HDLC SERPL: 36 % (ref 20–50)
LDLC SERPL CALC-MCNC: 79.4 MG/DL (ref 63–159)
NONHDLC SERPL-MCNC: 96 MG/DL
POTASSIUM SERPL-SCNC: 3.8 MMOL/L (ref 3.5–5.1)
PROT SERPL-MCNC: 7.2 G/DL (ref 6–8.4)
SODIUM SERPL-SCNC: 138 MMOL/L (ref 136–145)
TRIGL SERPL-MCNC: 83 MG/DL (ref 30–150)
VIT B12 SERPL-MCNC: 380 PG/ML (ref 210–950)

## 2021-02-24 ENCOUNTER — IMMUNIZATION (OUTPATIENT)
Dept: FAMILY MEDICINE | Facility: CLINIC | Age: 61
End: 2021-02-24
Payer: COMMERCIAL

## 2021-02-24 DIAGNOSIS — Z23 NEED FOR VACCINATION: Primary | ICD-10-CM

## 2021-02-24 PROCEDURE — 91300 COVID-19, MRNA, LNP-S, PF, 30 MCG/0.3 ML DOSE VACCINE: CPT | Mod: PBBFAC | Performed by: INTERNAL MEDICINE

## 2021-03-17 ENCOUNTER — IMMUNIZATION (OUTPATIENT)
Dept: FAMILY MEDICINE | Facility: CLINIC | Age: 61
End: 2021-03-17
Payer: COMMERCIAL

## 2021-03-17 DIAGNOSIS — Z71.9 HEALTH EDUCATION/COUNSELING: Primary | ICD-10-CM

## 2021-03-17 DIAGNOSIS — Z23 NEED FOR VACCINATION: Primary | ICD-10-CM

## 2021-03-17 PROCEDURE — 0002A COVID-19, MRNA, LNP-S, PF, 30 MCG/0.3 ML DOSE VACCINE: CPT | Mod: PBBFAC | Performed by: INTERNAL MEDICINE

## 2021-03-17 PROCEDURE — 91300 COVID-19, MRNA, LNP-S, PF, 30 MCG/0.3 ML DOSE VACCINE: CPT | Mod: PBBFAC | Performed by: INTERNAL MEDICINE

## 2021-05-06 ENCOUNTER — PATIENT MESSAGE (OUTPATIENT)
Dept: UROLOGY | Facility: CLINIC | Age: 61
End: 2021-05-06

## 2021-06-08 ENCOUNTER — LAB VISIT (OUTPATIENT)
Dept: LAB | Facility: HOSPITAL | Age: 61
End: 2021-06-08
Attending: NURSE PRACTITIONER
Payer: COMMERCIAL

## 2021-06-08 DIAGNOSIS — N40.1 BPH WITH OBSTRUCTION/LOWER URINARY TRACT SYMPTOMS: ICD-10-CM

## 2021-06-08 DIAGNOSIS — N13.8 BPH WITH OBSTRUCTION/LOWER URINARY TRACT SYMPTOMS: ICD-10-CM

## 2021-06-08 LAB
PROSTATE SPECIFIC ANTIGEN, TOTAL: 2.8 NG/ML (ref 0–4)
PSA FREE MFR SERPL: 16.07 %
PSA FREE SERPL-MCNC: 0.45 NG/ML (ref 0.01–1.5)

## 2021-06-08 PROCEDURE — 36415 COLL VENOUS BLD VENIPUNCTURE: CPT | Mod: PO | Performed by: NURSE PRACTITIONER

## 2021-06-08 PROCEDURE — 84154 ASSAY OF PSA FREE: CPT | Performed by: NURSE PRACTITIONER

## 2021-06-08 PROCEDURE — 84153 ASSAY OF PSA TOTAL: CPT | Performed by: NURSE PRACTITIONER

## 2021-08-02 ENCOUNTER — LAB VISIT (OUTPATIENT)
Dept: PRIMARY CARE CLINIC | Facility: OTHER | Age: 61
End: 2021-08-02
Attending: INTERNAL MEDICINE
Payer: COMMERCIAL

## 2021-08-02 DIAGNOSIS — Z20.822 ENCOUNTER FOR LABORATORY TESTING FOR COVID-19 VIRUS: ICD-10-CM

## 2021-08-02 PROCEDURE — U0003 INFECTIOUS AGENT DETECTION BY NUCLEIC ACID (DNA OR RNA); SEVERE ACUTE RESPIRATORY SYNDROME CORONAVIRUS 2 (SARS-COV-2) (CORONAVIRUS DISEASE [COVID-19]), AMPLIFIED PROBE TECHNIQUE, MAKING USE OF HIGH THROUGHPUT TECHNOLOGIES AS DESCRIBED BY CMS-2020-01-R: HCPCS | Performed by: INTERNAL MEDICINE

## 2021-08-04 LAB
SARS-COV-2 RNA RESP QL NAA+PROBE: NOT DETECTED
SARS-COV-2- CYCLE NUMBER: -1

## 2021-09-27 ENCOUNTER — OFFICE VISIT (OUTPATIENT)
Dept: CARDIOLOGY | Facility: CLINIC | Age: 61
End: 2021-09-27
Payer: COMMERCIAL

## 2021-09-27 ENCOUNTER — HOSPITAL ENCOUNTER (OUTPATIENT)
Dept: CARDIOLOGY | Facility: HOSPITAL | Age: 61
Discharge: HOME | End: 2021-09-27
Attending: INTERNAL MEDICINE
Payer: COMMERCIAL

## 2021-09-27 VITALS
DIASTOLIC BLOOD PRESSURE: 82 MMHG | SYSTOLIC BLOOD PRESSURE: 142 MMHG | DIASTOLIC BLOOD PRESSURE: 96 MMHG | BODY MASS INDEX: 27.01 KG/M2 | HEIGHT: 70 IN | HEART RATE: 47 BPM | BODY MASS INDEX: 26.05 KG/M2 | SYSTOLIC BLOOD PRESSURE: 146 MMHG | WEIGHT: 182 LBS | OXYGEN SATURATION: 100 % | HEART RATE: 55 BPM | WEIGHT: 188.69 LBS | HEIGHT: 70 IN

## 2021-09-27 DIAGNOSIS — I63.9 CRYPTOGENIC STROKE: ICD-10-CM

## 2021-09-27 DIAGNOSIS — R25.2 LEG CRAMPS: ICD-10-CM

## 2021-09-27 DIAGNOSIS — Q21.12 PFO (PATENT FORAMEN OVALE): ICD-10-CM

## 2021-09-27 DIAGNOSIS — Q21.12 PFO (PATENT FORAMEN OVALE): Primary | ICD-10-CM

## 2021-09-27 LAB
ASCENDING AORTA: 4.1 CM
AV INDEX (PROSTH): 0.74
AV MEAN GRADIENT: 5 MMHG
AV PEAK GRADIENT: 6 MMHG
AV VALVE AREA: 2.38 CM2
AV VELOCITY RATIO: 0.79
BSA FOR ECHO PROCEDURE: 2.02 M2
CV ECHO LV RWT: 0.37 CM
DOP CALC AO PEAK VEL: 1.26 M/S
DOP CALC AO VTI: 33.75 CM
DOP CALC LVOT AREA: 3.2 CM2
DOP CALC LVOT DIAMETER: 2.03 CM
DOP CALC LVOT PEAK VEL: 1 M/S
DOP CALC LVOT STROKE VOLUME: 80.45 CM3
DOP CALCLVOT PEAK VEL VTI: 24.87 CM
E WAVE DECELERATION TIME: 321.66 MSEC
E/A RATIO: 0.94
E/E' RATIO: 5.63 M/S
ECHO LV POSTERIOR WALL: 0.99 CM (ref 0.6–1.1)
EJECTION FRACTION: 65 %
FRACTIONAL SHORTENING: 31 % (ref 28–44)
INTERVENTRICULAR SEPTUM: 0.81 CM (ref 0.6–1.1)
LA MAJOR: 5.01 CM
LA MINOR: 5.19 CM
LA WIDTH: 3.92 CM
LEFT ATRIUM SIZE: 4.04 CM
LEFT ATRIUM VOLUME INDEX MOD: 32.3 ML/M2
LEFT ATRIUM VOLUME INDEX: 34.1 ML/M2
LEFT ATRIUM VOLUME MOD: 65 CM3
LEFT ATRIUM VOLUME: 68.63 CM3
LEFT INTERNAL DIMENSION IN SYSTOLE: 3.65 CM (ref 2.1–4)
LEFT VENTRICLE DIASTOLIC VOLUME INDEX: 67.53 ML/M2
LEFT VENTRICLE DIASTOLIC VOLUME: 135.73 ML
LEFT VENTRICLE MASS INDEX: 87 G/M2
LEFT VENTRICLE SYSTOLIC VOLUME INDEX: 27.9 ML/M2
LEFT VENTRICLE SYSTOLIC VOLUME: 56.1 ML
LEFT VENTRICULAR INTERNAL DIMENSION IN DIASTOLE: 5.31 CM (ref 3.5–6)
LEFT VENTRICULAR MASS: 175.07 G
LV LATERAL E/E' RATIO: 5 M/S
LV SEPTAL E/E' RATIO: 6.43 M/S
MV A" WAVE DURATION": 11.8 MSEC
MV PEAK A VEL: 0.48 M/S
MV PEAK E VEL: 0.45 M/S
MV STENOSIS PRESSURE HALF TIME: 93.28 MS
MV VALVE AREA P 1/2 METHOD: 2.36 CM2
PISA TR MAX VEL: 1.97 M/S
PULM VEIN S/D RATIO: 0.73
PV PEAK D VEL: 0.44 M/S
PV PEAK S VEL: 0.32 M/S
RA MAJOR: 5.23 CM
RA PRESSURE: 3 MMHG
RA WIDTH: 3.53 CM
RIGHT VENTRICULAR END-DIASTOLIC DIMENSION: 4.37 CM
RV TISSUE DOPPLER FREE WALL SYSTOLIC VELOCITY 1 (APICAL 4 CHAMBER VIEW): 10.94 CM/S
SINUS: 3.6 CM
STJ: 3.56 CM
TDI LATERAL: 0.09 M/S
TDI SEPTAL: 0.07 M/S
TDI: 0.08 M/S
TR MAX PG: 16 MMHG
TRICUSPID ANNULAR PLANE SYSTOLIC EXCURSION: 2.66 CM
TV REST PULMONARY ARTERY PRESSURE: 19 MMHG

## 2021-09-27 PROCEDURE — 3080F PR MOST RECENT DIASTOLIC BLOOD PRESSURE >= 90 MM HG: ICD-10-PCS | Mod: CPTII,S$GLB,, | Performed by: INTERNAL MEDICINE

## 2021-09-27 PROCEDURE — 93306 TTE W/DOPPLER COMPLETE: CPT

## 2021-09-27 PROCEDURE — 93306 ECHO (CUPID ONLY): ICD-10-PCS | Mod: 26,,, | Performed by: INTERNAL MEDICINE

## 2021-09-27 PROCEDURE — 99999 PR PBB SHADOW E&M-EST. PATIENT-LVL III: CPT | Mod: PBBFAC,,, | Performed by: INTERNAL MEDICINE

## 2021-09-27 PROCEDURE — 93306 TTE W/DOPPLER COMPLETE: CPT | Mod: 26,,, | Performed by: INTERNAL MEDICINE

## 2021-09-27 PROCEDURE — 3008F PR BODY MASS INDEX (BMI) DOCUMENTED: ICD-10-PCS | Mod: CPTII,S$GLB,, | Performed by: INTERNAL MEDICINE

## 2021-09-27 PROCEDURE — 1159F MED LIST DOCD IN RCRD: CPT | Mod: CPTII,S$GLB,, | Performed by: INTERNAL MEDICINE

## 2021-09-27 PROCEDURE — 3077F SYST BP >= 140 MM HG: CPT | Mod: CPTII,S$GLB,, | Performed by: INTERNAL MEDICINE

## 2021-09-27 PROCEDURE — 3077F PR MOST RECENT SYSTOLIC BLOOD PRESSURE >= 140 MM HG: ICD-10-PCS | Mod: CPTII,S$GLB,, | Performed by: INTERNAL MEDICINE

## 2021-09-27 PROCEDURE — 3008F BODY MASS INDEX DOCD: CPT | Mod: CPTII,S$GLB,, | Performed by: INTERNAL MEDICINE

## 2021-09-27 PROCEDURE — 3080F DIAST BP >= 90 MM HG: CPT | Mod: CPTII,S$GLB,, | Performed by: INTERNAL MEDICINE

## 2021-09-27 PROCEDURE — 99999 PR PBB SHADOW E&M-EST. PATIENT-LVL III: ICD-10-PCS | Mod: PBBFAC,,, | Performed by: INTERNAL MEDICINE

## 2021-09-27 PROCEDURE — 99212 OFFICE O/P EST SF 10 MIN: CPT | Mod: S$GLB,,, | Performed by: INTERNAL MEDICINE

## 2021-09-27 PROCEDURE — 1159F PR MEDICATION LIST DOCUMENTED IN MEDICAL RECORD: ICD-10-PCS | Mod: CPTII,S$GLB,, | Performed by: INTERNAL MEDICINE

## 2021-09-27 PROCEDURE — 99212 PR OFFICE/OUTPT VISIT, EST, LEVL II, 10-19 MIN: ICD-10-PCS | Mod: S$GLB,,, | Performed by: INTERNAL MEDICINE

## 2021-09-28 ENCOUNTER — IMMUNIZATION (OUTPATIENT)
Dept: INTERNAL MEDICINE | Facility: CLINIC | Age: 61
End: 2021-09-28
Payer: COMMERCIAL

## 2021-09-28 DIAGNOSIS — Z23 NEED FOR VACCINATION: Primary | ICD-10-CM

## 2021-09-28 PROCEDURE — 0003A COVID-19, MRNA, LNP-S, PF, 30 MCG/0.3 ML DOSE VACCINE: CPT | Mod: CV19,PBBFAC | Performed by: INTERNAL MEDICINE

## 2021-09-28 PROCEDURE — 91300 COVID-19, MRNA, LNP-S, PF, 30 MCG/0.3 ML DOSE VACCINE: CPT | Mod: PBBFAC | Performed by: INTERNAL MEDICINE

## 2022-02-18 ENCOUNTER — PATIENT MESSAGE (OUTPATIENT)
Dept: CARDIOLOGY | Facility: CLINIC | Age: 62
End: 2022-02-18
Payer: COMMERCIAL

## 2022-10-26 NOTE — PROGRESS NOTES
Repeat bubble study completed.  Bubble study X 2 done with & without Valsalva.  Pt tolerated well.   Normal vision: sees adequately in most situations; can see medication labels, newsprint

## 2022-12-02 ENCOUNTER — OFFICE VISIT (OUTPATIENT)
Dept: UROLOGY | Facility: CLINIC | Age: 62
End: 2022-12-02
Payer: COMMERCIAL

## 2022-12-02 VITALS — SYSTOLIC BLOOD PRESSURE: 138 MMHG | HEART RATE: 52 BPM | DIASTOLIC BLOOD PRESSURE: 86 MMHG

## 2022-12-02 DIAGNOSIS — N13.8 BPH WITH OBSTRUCTION/LOWER URINARY TRACT SYMPTOMS: ICD-10-CM

## 2022-12-02 DIAGNOSIS — N40.1 BPH WITH OBSTRUCTION/LOWER URINARY TRACT SYMPTOMS: ICD-10-CM

## 2022-12-02 DIAGNOSIS — F52.32 DELAYED EJACULATION: ICD-10-CM

## 2022-12-02 DIAGNOSIS — Z12.5 PROSTATE CANCER SCREENING: Primary | ICD-10-CM

## 2022-12-02 PROCEDURE — 99213 PR OFFICE/OUTPT VISIT, EST, LEVL III, 20-29 MIN: ICD-10-PCS | Mod: S$GLB,,, | Performed by: NURSE PRACTITIONER

## 2022-12-02 PROCEDURE — 1159F PR MEDICATION LIST DOCUMENTED IN MEDICAL RECORD: ICD-10-PCS | Mod: CPTII,S$GLB,, | Performed by: NURSE PRACTITIONER

## 2022-12-02 PROCEDURE — 1160F PR REVIEW ALL MEDS BY PRESCRIBER/CLIN PHARMACIST DOCUMENTED: ICD-10-PCS | Mod: CPTII,S$GLB,, | Performed by: NURSE PRACTITIONER

## 2022-12-02 PROCEDURE — 3079F DIAST BP 80-89 MM HG: CPT | Mod: CPTII,S$GLB,, | Performed by: NURSE PRACTITIONER

## 2022-12-02 PROCEDURE — 1159F MED LIST DOCD IN RCRD: CPT | Mod: CPTII,S$GLB,, | Performed by: NURSE PRACTITIONER

## 2022-12-02 PROCEDURE — 3079F PR MOST RECENT DIASTOLIC BLOOD PRESSURE 80-89 MM HG: ICD-10-PCS | Mod: CPTII,S$GLB,, | Performed by: NURSE PRACTITIONER

## 2022-12-02 PROCEDURE — 3075F PR MOST RECENT SYSTOLIC BLOOD PRESS GE 130-139MM HG: ICD-10-PCS | Mod: CPTII,S$GLB,, | Performed by: NURSE PRACTITIONER

## 2022-12-02 PROCEDURE — 99213 OFFICE O/P EST LOW 20 MIN: CPT | Mod: S$GLB,,, | Performed by: NURSE PRACTITIONER

## 2022-12-02 PROCEDURE — 1160F RVW MEDS BY RX/DR IN RCRD: CPT | Mod: CPTII,S$GLB,, | Performed by: NURSE PRACTITIONER

## 2022-12-02 PROCEDURE — 3075F SYST BP GE 130 - 139MM HG: CPT | Mod: CPTII,S$GLB,, | Performed by: NURSE PRACTITIONER

## 2022-12-02 RX ORDER — TAMSULOSIN HYDROCHLORIDE 0.4 MG/1
0.8 CAPSULE ORAL DAILY
Qty: 180 CAPSULE | Refills: 3 | Status: SHIPPED | OUTPATIENT
Start: 2022-12-02 | End: 2023-11-29

## 2022-12-02 NOTE — PROGRESS NOTES
Subjective:      Gt Banerjee is a 62 y.o. male who returns today regarding his BPH.    On flomax for LUTS since 2019. Last seen in 2020.    Component      Latest Ref Rng & Units 6/8/2021 11/16/2020 7/31/2019   PSA Total      0.00 - 4.00 ng/mL 2.8     PSA, Free      0.01 - 1.50 ng/mL 0.45     PSA, Free %      Not established % 16.07     PSA Diagnostic      0.00 - 4.00 ng/mL  4.0 3.5     Today he reports urinary hesitancy at times as well as slow stream. Denies dysuria, frequency, urgency and gross hematuria. Denies nocturia.     Denies a history of recurrent UTIs and nephrolithiasis.  Denies ED. Some issues with delayed ejaculation. Denies low libido.    Denies a family history of prostate cancer.     The following portions of the patient's history were reviewed and updated as appropriate: allergies, current medications, past family history, past medical history, past social history, past surgical history and problem list.    Review of Systems  Constitutional: no fever or chills  ENT: no nasal congestion or sore throat  Respiratory: no cough or shortness of breath  Cardiovascular: no chest pain or palpitations  Gastrointestinal: no nausea or vomiting, tolerating diet  Genitourinary: as per HPI  Hematologic/Lymphatic: no easy bruising or lymphadenopathy  Musculoskeletal: no arthralgias or myalgias  Neurological: no seizures or tremors  Behavioral/Psych: no auditory or visual hallucinations     Objective:   Vitals:   Vitals:    12/02/22 1119   BP: 138/86   Pulse: (!) 52     Physical Exam   General: alert and oriented, no acute distress  Head: normocephalic, atraumatic  Neck: supple, normal ROM  Respiratory: Symmetric expansion, non-labored breathing  Cardiovascular: regular rate and rhythm  Abdomen: soft, non tender, non distended  Genitourinary:   Prostate: normal for age, normal consistency, non tender, no specific nodules, enlarged: bilateral, size: 40 gm; seminal vesicles not palpated  Rectum: normal rectal  tone, no rectal mass, normal perineum  Skin: normal coloration and turgor, no rashes, no suspicious skin lesions noted  Neuro: alert and oriented x3, no gross deficits  Psych: normal judgment and insight, normal mood/affect, and non-anxious    Lab Review   Urinalysis demonstrates : negative for all components  Lab Results   Component Value Date    WBC 5.58 02/18/2021    HGB 14.1 02/18/2021    HCT 41.4 02/18/2021    MCV 98 02/18/2021     02/18/2021     Lab Results   Component Value Date    CREATININE 0.8 09/27/2021    BUN 16 09/27/2021     No results found for: PSA  Imaging   None    Assessment:     1. Prostate cancer screening    2. Delayed ejaculation    3. BPH with obstruction/lower urinary tract symptoms      Plan:   Diagnoses and all orders for this visit:    Prostate cancer screening  -     Prostate Specific Antigen, Diagnostic; Future    Delayed ejaculation  -     Testosterone; Future    BPH with obstruction/lower urinary tract symptoms    Other orders  -     tamsulosin (FLOMAX) 0.4 mg Cap; Take 2 capsules (0.8 mg total) by mouth once daily.    Plan:  --PSA and testosterone, early am lab  --Increase Flomax to 0.8 mg   --Discussed urolift, defer for now  --Will notify with results

## 2022-12-05 ENCOUNTER — PATIENT MESSAGE (OUTPATIENT)
Dept: CARDIOLOGY | Facility: CLINIC | Age: 62
End: 2022-12-05
Payer: COMMERCIAL

## 2023-01-12 ENCOUNTER — LAB VISIT (OUTPATIENT)
Dept: LAB | Facility: HOSPITAL | Age: 63
End: 2023-01-12
Payer: COMMERCIAL

## 2023-01-12 DIAGNOSIS — Z12.5 PROSTATE CANCER SCREENING: ICD-10-CM

## 2023-01-12 DIAGNOSIS — F52.32 DELAYED EJACULATION: ICD-10-CM

## 2023-01-12 LAB
COMPLEXED PSA SERPL-MCNC: 6.6 NG/ML (ref 0–4)
TESTOST SERPL-MCNC: 502 NG/DL (ref 304–1227)

## 2023-01-12 PROCEDURE — 84403 ASSAY OF TOTAL TESTOSTERONE: CPT | Performed by: NURSE PRACTITIONER

## 2023-01-12 PROCEDURE — 84153 ASSAY OF PSA TOTAL: CPT | Performed by: NURSE PRACTITIONER

## 2023-01-12 PROCEDURE — 36415 COLL VENOUS BLD VENIPUNCTURE: CPT | Mod: PO | Performed by: NURSE PRACTITIONER

## 2023-01-13 ENCOUNTER — PATIENT MESSAGE (OUTPATIENT)
Dept: UROLOGY | Facility: CLINIC | Age: 63
End: 2023-01-13
Payer: COMMERCIAL

## 2023-01-13 DIAGNOSIS — N30.00 ACUTE CYSTITIS WITHOUT HEMATURIA: Primary | ICD-10-CM

## 2023-01-13 DIAGNOSIS — R97.20 ELEVATED PSA: ICD-10-CM

## 2023-01-18 ENCOUNTER — LAB VISIT (OUTPATIENT)
Dept: LAB | Facility: HOSPITAL | Age: 63
End: 2023-01-18
Payer: COMMERCIAL

## 2023-01-18 DIAGNOSIS — R97.20 ELEVATED PSA: ICD-10-CM

## 2023-01-18 LAB
PROSTATE SPECIFIC ANTIGEN, TOTAL: 7.2 NG/ML (ref 0–4)
PSA FREE MFR SERPL: 15.56 %
PSA FREE SERPL-MCNC: 1.12 NG/ML (ref 0–1.5)

## 2023-01-18 PROCEDURE — 84153 ASSAY OF PSA TOTAL: CPT | Performed by: NURSE PRACTITIONER

## 2023-01-18 PROCEDURE — 36415 COLL VENOUS BLD VENIPUNCTURE: CPT | Mod: PO | Performed by: NURSE PRACTITIONER

## 2023-01-22 ENCOUNTER — PATIENT MESSAGE (OUTPATIENT)
Dept: UROLOGY | Facility: CLINIC | Age: 63
End: 2023-01-22
Payer: COMMERCIAL

## 2023-01-31 ENCOUNTER — OFFICE VISIT (OUTPATIENT)
Dept: UROLOGY | Facility: CLINIC | Age: 63
End: 2023-01-31
Payer: COMMERCIAL

## 2023-01-31 DIAGNOSIS — R97.20 ELEVATED PSA: Primary | ICD-10-CM

## 2023-01-31 DIAGNOSIS — N40.1 BPH WITH OBSTRUCTION/LOWER URINARY TRACT SYMPTOMS: ICD-10-CM

## 2023-01-31 DIAGNOSIS — N13.8 BPH WITH OBSTRUCTION/LOWER URINARY TRACT SYMPTOMS: ICD-10-CM

## 2023-01-31 PROCEDURE — 1159F PR MEDICATION LIST DOCUMENTED IN MEDICAL RECORD: ICD-10-PCS | Mod: CPTII,95,, | Performed by: NURSE PRACTITIONER

## 2023-01-31 PROCEDURE — 1160F PR REVIEW ALL MEDS BY PRESCRIBER/CLIN PHARMACIST DOCUMENTED: ICD-10-PCS | Mod: CPTII,95,, | Performed by: NURSE PRACTITIONER

## 2023-01-31 PROCEDURE — 1159F MED LIST DOCD IN RCRD: CPT | Mod: CPTII,95,, | Performed by: NURSE PRACTITIONER

## 2023-01-31 PROCEDURE — 99213 OFFICE O/P EST LOW 20 MIN: CPT | Mod: 95,,, | Performed by: NURSE PRACTITIONER

## 2023-01-31 PROCEDURE — 99213 PR OFFICE/OUTPT VISIT, EST, LEVL III, 20-29 MIN: ICD-10-PCS | Mod: 95,,, | Performed by: NURSE PRACTITIONER

## 2023-01-31 PROCEDURE — 1160F RVW MEDS BY RX/DR IN RCRD: CPT | Mod: CPTII,95,, | Performed by: NURSE PRACTITIONER

## 2023-01-31 NOTE — PROGRESS NOTES
Subjective:      Gt Banerjee is a 62 y.o. male who returns today regarding his elevated PSA.     On flomax for LUTS since 2019. Recently increased to 0.8 mg.     Elevated PSA  Patient is here with an elevated PSA. He has no personal history and no family history of prostate cancer. He has no prior genitourinary history of hematuria, hematospermia, prostatitis, UTI, urolithiasis, epididymal orchitis, previous  surgery.  Previous PSA values are :  Component      Latest Ref Rng & Units 1/18/2023 1/12/2023 6/8/2021 11/16/2020   PSA Total      0.00 - 4.00 ng/mL 7.2 (H)  2.8    PSA, Free      0.00 - 1.50 ng/mL 1.12  0.45    PSA, Free %      Not established % 15.56  16.07    PSA Diagnostic      0.00 - 4.00 ng/mL  6.6 (H)  4.0     Component      Latest Ref Rng & Units 7/31/2019   PSA Total      0.00 - 4.00 ng/mL    PSA, Free      0.00 - 1.50 ng/mL    PSA, Free %      Not established %    PSA Diagnostic      0.00 - 4.00 ng/mL 3.5     Denies dysuria, hematuria, perineal/flank pain and fever/chills.     The following portions of the patient's history were reviewed and updated as appropriate: allergies, current medications, past family history, past medical history, past social history, past surgical history and problem list.    Review of Systems  Constitutional: no fever or chills  ENT: no nasal congestion or sore throat  Respiratory: no cough or shortness of breath  Cardiovascular: no chest pain or palpitations  Gastrointestinal: no nausea or vomiting, tolerating diet  Genitourinary: as per HPI  Hematologic/Lymphatic: no easy bruising or lymphadenopathy  Musculoskeletal: no arthralgias or myalgias  Neurological: no seizures or tremors  Behavioral/Psych: no auditory or visual hallucinations     Objective:   Vitals: There were no vitals taken for this visit. (Virtual visit)     Physical Exam   Deferred, VV    Lab Review   Urinalysis demonstrates : no sample,virtual   Lab Results   Component Value Date    WBC 5.58  02/18/2021    HGB 14.1 02/18/2021    HCT 41.4 02/18/2021    MCV 98 02/18/2021     02/18/2021     Lab Results   Component Value Date    CREATININE 0.8 09/27/2021    BUN 16 09/27/2021     No results found for: PSA  Imaging   None     Assessment:     1. Elevated PSA    2. BPH with obstruction/lower urinary tract symptoms      Plan:   Diagnoses and all orders for this visit:    Elevated PSA  -     Urine culture  -     Basic Metabolic Panel; Future  -     MRI Prostate W W/O Contrast; Future    BPH with obstruction/lower urinary tract symptoms      Plan:  --We discussed this significance of his elevated PSA, various benign etiologies including BPH and infection, and the associated risk of prostate cancer.   --Standing order for urine culture  --Prostate MRI for further evaluation     The patient location is: home  The chief complaint leading to consultation is: elevated PSA    Visit type: audiovisual    Face to Face time with patient: 7 min  20 minutes of total time spent on the encounter, which includes face to face time and non-face to face time preparing to see the patient (eg, review of tests), Obtaining and/or reviewing separately obtained history, Documenting clinical information in the electronic or other health record, Independently interpreting results (not separately reported) and communicating results to the patient/family/caregiver, or Care coordination (not separately reported).     Each patient to whom he or she provides medical services by telemedicine is:  (1) informed of the relationship between the physician and patient and the respective role of any other health care provider with respect to management of the patient; and (2) notified that he or she may decline to receive medical services by telemedicine and may withdraw from such care at any time.    Notes: see above

## 2023-02-02 ENCOUNTER — PATIENT MESSAGE (OUTPATIENT)
Dept: UROLOGY | Facility: CLINIC | Age: 63
End: 2023-02-02
Payer: COMMERCIAL

## 2023-02-18 ENCOUNTER — PATIENT MESSAGE (OUTPATIENT)
Dept: UROLOGY | Facility: CLINIC | Age: 63
End: 2023-02-18
Payer: COMMERCIAL

## 2023-02-20 ENCOUNTER — PATIENT MESSAGE (OUTPATIENT)
Dept: CARDIOLOGY | Facility: CLINIC | Age: 63
End: 2023-02-20
Payer: COMMERCIAL

## 2023-02-20 ENCOUNTER — LAB VISIT (OUTPATIENT)
Dept: LAB | Facility: HOSPITAL | Age: 63
End: 2023-02-20
Payer: COMMERCIAL

## 2023-02-20 ENCOUNTER — IMMUNIZATION (OUTPATIENT)
Dept: INTERNAL MEDICINE | Facility: CLINIC | Age: 63
End: 2023-02-20
Payer: COMMERCIAL

## 2023-02-20 DIAGNOSIS — Z23 NEED FOR VACCINATION: Primary | ICD-10-CM

## 2023-02-20 DIAGNOSIS — R97.20 ELEVATED PSA: ICD-10-CM

## 2023-02-20 LAB
ANION GAP SERPL CALC-SCNC: 6 MMOL/L (ref 8–16)
BUN SERPL-MCNC: 19 MG/DL (ref 8–23)
CALCIUM SERPL-MCNC: 9.2 MG/DL (ref 8.7–10.5)
CHLORIDE SERPL-SCNC: 102 MMOL/L (ref 95–110)
CO2 SERPL-SCNC: 28 MMOL/L (ref 23–29)
CREAT SERPL-MCNC: 0.9 MG/DL (ref 0.5–1.4)
EST. GFR  (NO RACE VARIABLE): >60 ML/MIN/1.73 M^2
GLUCOSE SERPL-MCNC: 97 MG/DL (ref 70–110)
POTASSIUM SERPL-SCNC: 4.3 MMOL/L (ref 3.5–5.1)
SODIUM SERPL-SCNC: 136 MMOL/L (ref 136–145)

## 2023-02-20 PROCEDURE — 0124A COVID-19, MRNA, LNP-S, BIVALENT BOOSTER, PF, 30 MCG/0.3 ML DOSE: CPT | Mod: CV19,PBBFAC | Performed by: INTERNAL MEDICINE

## 2023-02-20 PROCEDURE — 36415 COLL VENOUS BLD VENIPUNCTURE: CPT | Performed by: NURSE PRACTITIONER

## 2023-02-20 PROCEDURE — 91312 COVID-19, MRNA, LNP-S, BIVALENT BOOSTER, PF, 30 MCG/0.3 ML DOSE: CPT | Mod: S$GLB,,, | Performed by: INTERNAL MEDICINE

## 2023-02-20 PROCEDURE — 80048 BASIC METABOLIC PNL TOTAL CA: CPT | Performed by: NURSE PRACTITIONER

## 2023-02-20 PROCEDURE — 91312 COVID-19, MRNA, LNP-S, BIVALENT BOOSTER, PF, 30 MCG/0.3 ML DOSE: ICD-10-PCS | Mod: S$GLB,,, | Performed by: INTERNAL MEDICINE

## 2023-02-22 ENCOUNTER — HOSPITAL ENCOUNTER (OUTPATIENT)
Dept: RADIOLOGY | Facility: HOSPITAL | Age: 63
Discharge: HOME | End: 2023-02-22
Attending: NURSE PRACTITIONER
Payer: COMMERCIAL

## 2023-02-22 DIAGNOSIS — R97.20 ELEVATED PSA: ICD-10-CM

## 2023-02-22 PROCEDURE — 72197 MRI PELVIS W/O & W/DYE: CPT | Mod: TC

## 2023-02-22 PROCEDURE — 72197 MRI PROSTATE W W/O CONTRAST: ICD-10-PCS | Mod: 26,,, | Performed by: RADIOLOGY

## 2023-02-22 PROCEDURE — 25500020 PHARM REV CODE 255: Performed by: NURSE PRACTITIONER

## 2023-02-22 PROCEDURE — A9585 GADOBUTROL INJECTION: HCPCS | Performed by: NURSE PRACTITIONER

## 2023-02-22 PROCEDURE — 72197 MRI PELVIS W/O & W/DYE: CPT | Mod: 26,,, | Performed by: RADIOLOGY

## 2023-02-22 RX ORDER — GADOBUTROL 604.72 MG/ML
10 INJECTION INTRAVENOUS
Status: COMPLETED | OUTPATIENT
Start: 2023-02-22 | End: 2023-02-22

## 2023-02-22 RX ADMIN — GADOBUTROL 10 ML: 604.72 INJECTION INTRAVENOUS at 02:02

## 2023-02-23 DIAGNOSIS — R97.20 ELEVATED PSA: Primary | ICD-10-CM

## 2023-03-29 ENCOUNTER — OFFICE VISIT (OUTPATIENT)
Dept: CARDIOLOGY | Facility: CLINIC | Age: 63
End: 2023-03-29
Payer: COMMERCIAL

## 2023-03-29 VITALS
DIASTOLIC BLOOD PRESSURE: 70 MMHG | BODY MASS INDEX: 26.99 KG/M2 | SYSTOLIC BLOOD PRESSURE: 120 MMHG | WEIGHT: 188.5 LBS | HEIGHT: 70 IN | HEART RATE: 56 BPM | OXYGEN SATURATION: 97 %

## 2023-03-29 DIAGNOSIS — Z13.6 ENCOUNTER FOR SCREENING FOR CARDIOVASCULAR DISORDERS: ICD-10-CM

## 2023-03-29 DIAGNOSIS — Z87.74 S/P PATENT FORAMEN OVALE CLOSURE: ICD-10-CM

## 2023-03-29 DIAGNOSIS — Q21.12 PFO (PATENT FORAMEN OVALE): ICD-10-CM

## 2023-03-29 DIAGNOSIS — Z86.73 HISTORY OF ISCHEMIC VERTEBROBASILAR ARTERY BRAINSTEM STROKE: ICD-10-CM

## 2023-03-29 DIAGNOSIS — I63.9 CRYPTOGENIC STROKE: Primary | ICD-10-CM

## 2023-03-29 PROCEDURE — 3074F PR MOST RECENT SYSTOLIC BLOOD PRESSURE < 130 MM HG: ICD-10-PCS | Mod: CPTII,S$GLB,, | Performed by: INTERNAL MEDICINE

## 2023-03-29 PROCEDURE — 3008F PR BODY MASS INDEX (BMI) DOCUMENTED: ICD-10-PCS | Mod: CPTII,S$GLB,, | Performed by: INTERNAL MEDICINE

## 2023-03-29 PROCEDURE — 3078F DIAST BP <80 MM HG: CPT | Mod: CPTII,S$GLB,, | Performed by: INTERNAL MEDICINE

## 2023-03-29 PROCEDURE — 3008F BODY MASS INDEX DOCD: CPT | Mod: CPTII,S$GLB,, | Performed by: INTERNAL MEDICINE

## 2023-03-29 PROCEDURE — 99213 OFFICE O/P EST LOW 20 MIN: CPT | Mod: S$GLB,,, | Performed by: INTERNAL MEDICINE

## 2023-03-29 PROCEDURE — 99213 PR OFFICE/OUTPT VISIT, EST, LEVL III, 20-29 MIN: ICD-10-PCS | Mod: S$GLB,,, | Performed by: INTERNAL MEDICINE

## 2023-03-29 PROCEDURE — 1159F MED LIST DOCD IN RCRD: CPT | Mod: CPTII,S$GLB,, | Performed by: INTERNAL MEDICINE

## 2023-03-29 PROCEDURE — 3074F SYST BP LT 130 MM HG: CPT | Mod: CPTII,S$GLB,, | Performed by: INTERNAL MEDICINE

## 2023-03-29 PROCEDURE — 99999 PR PBB SHADOW E&M-EST. PATIENT-LVL IV: CPT | Mod: PBBFAC,,, | Performed by: INTERNAL MEDICINE

## 2023-03-29 PROCEDURE — 3078F PR MOST RECENT DIASTOLIC BLOOD PRESSURE < 80 MM HG: ICD-10-PCS | Mod: CPTII,S$GLB,, | Performed by: INTERNAL MEDICINE

## 2023-03-29 PROCEDURE — 1159F PR MEDICATION LIST DOCUMENTED IN MEDICAL RECORD: ICD-10-PCS | Mod: CPTII,S$GLB,, | Performed by: INTERNAL MEDICINE

## 2023-03-29 PROCEDURE — 99999 PR PBB SHADOW E&M-EST. PATIENT-LVL IV: ICD-10-PCS | Mod: PBBFAC,,, | Performed by: INTERNAL MEDICINE

## 2023-03-29 NOTE — PROGRESS NOTES
Subjective:    Patient ID:  Gt Banerjee is a 62 y.o. 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 He continues to do well and has no neurologic symptoms, chest pain or NUNES. He takes a statin and requested if he should continue.   Lab Results   Component Value Date     02/20/2023    K 4.3 02/20/2023     02/20/2023    CO2 28 02/20/2023    BUN 19 02/20/2023    CREATININE 0.9 02/20/2023    GLU 97 02/20/2023    HGBA1C 4.9 07/31/2019    MG 2.2 09/27/2021    AST 21 09/27/2021    ALT 19 09/27/2021    ALBUMIN 4.2 09/27/2021    PROT 6.8 09/27/2021    BILITOT 0.8 09/27/2021    WBC 5.58 02/18/2021    HGB 14.1 02/18/2021    HCT 41.4 02/18/2021    MCV 98 02/18/2021     02/18/2021    INR 1.0 02/17/2021         Lab Results   Component Value Date    CHOL 150 02/18/2021    HDL 54 02/18/2021    TRIG 83 02/18/2021       Lab Results   Component Value Date    LDLCALC 79.4 02/18/2021       Past Medical History:   Diagnosis Date    Stroke        Current Outpatient Medications:     aspirin (ECOTRIN) 81 MG EC tablet, Take 81 mg by mouth once daily., Disp: , Rfl:     atorvastatin (LIPITOR) 20 MG tablet, TAKE ONE TABLET BY MOUTH EVERY DAY, Disp: 30 tablet, Rfl: 0    tamsulosin (FLOMAX) 0.4 mg Cap, Take 1 capsule (0.4 mg total) by mouth once daily., Disp: 30 capsule, Rfl: 0    tamsulosin (FLOMAX) 0.4 mg Cap, Take 2 capsules (0.8 mg total) by mouth once daily., Disp: 180 capsule, Rfl: 3          Review of Systems   Constitutional: Negative for decreased appetite, diaphoresis, fever, malaise/fatigue, weight gain and weight loss.   HENT:  Negative for congestion, ear discharge, ear pain and nosebleeds.    Eyes:  Negative for blurred vision, double vision and visual disturbance.   Cardiovascular:  Negative for chest pain, claudication, cyanosis, dyspnea on exertion, irregular heartbeat, leg swelling, near-syncope, orthopnea,  palpitations, paroxysmal nocturnal dyspnea and syncope.   Respiratory:  Negative for cough, hemoptysis, shortness of breath, sleep disturbances due to breathing, snoring, sputum production and wheezing.    Endocrine: Negative for polydipsia, polyphagia and polyuria.   Hematologic/Lymphatic: Negative for adenopathy and bleeding problem. Does not bruise/bleed easily.   Skin:  Negative for color change, nail changes, poor wound healing and rash.   Musculoskeletal:  Negative for muscle cramps and muscle weakness.   Gastrointestinal:  Negative for abdominal pain, anorexia, change in bowel habit, hematochezia, nausea and vomiting.   Genitourinary:  Negative for dysuria, frequency and hematuria.   Neurological:  Negative for brief paralysis, difficulty with concentration, excessive daytime sleepiness, dizziness, focal weakness, headaches, light-headedness, seizures, vertigo and weakness.   Psychiatric/Behavioral:  Negative for altered mental status and depression.    Allergic/Immunologic: Negative for persistent infections.      Objective:There were no vitals taken for this visit.            Physical Exam  Constitutional:       Appearance: Normal appearance. He is well-developed and normal weight.   HENT:      Head: Normocephalic.      Right Ear: External ear normal.      Left Ear: External ear normal.      Nose: Nose normal.   Eyes:      General: No scleral icterus.     Pupils: Pupils are equal, round, and reactive to light.   Neck:      Thyroid: No thyromegaly.      Vascular: No JVD.      Trachea: No tracheal deviation.   Cardiovascular:      Rate and Rhythm: Normal rate and regular rhythm.      Pulses: Intact distal pulses.           Carotid pulses are 2+ on the right side and 2+ on the left side.       Dorsalis pedis pulses are 2+ on the right side and 2+ on the left side.        Posterior tibial pulses are 2+ on the right side and 2+ on the left side.      Heart sounds: No murmur heard.    No friction rub. No gallop.    Pulmonary:      Effort: Pulmonary effort is normal.      Breath sounds: Normal breath sounds.   Abdominal:      General: Bowel sounds are normal. There is no distension.      Tenderness: There is no abdominal tenderness. There is no guarding.   Musculoskeletal:         General: No tenderness. Normal range of motion.      Cervical back: Normal range of motion and neck supple.   Lymphadenopathy:      Comments: Palpation of neck and groin lymph nodes normal   Skin:     General: Skin is dry.      Comments: Palpation of skin normal   Neurological:      Mental Status: He is alert and oriented to person, place, and time.      Cranial Nerves: No cranial nerve deficit.      Motor: No abnormal muscle tone.      Coordination: Coordination normal.   Psychiatric:         Behavior: Behavior normal.         Thought Content: Thought content normal.         Judgment: Judgment normal.       Assessment:       1. Cryptogenic stroke    2. History of ischemic vertebrobasilar artery brainstem stroke    3. PFO (patent foramen ovale)    4. S/P patent foramen ovale closure         Plan:       Diagnoses and all orders for this visit:    Cryptogenic stroke    History of ischemic vertebrobasilar artery brainstem stroke    PFO (patent foramen ovale)    S/P patent foramen ovale closure

## 2023-04-06 ENCOUNTER — PATIENT MESSAGE (OUTPATIENT)
Dept: CARDIOLOGY | Facility: CLINIC | Age: 63
End: 2023-04-06
Payer: COMMERCIAL

## 2023-04-13 ENCOUNTER — HOSPITAL ENCOUNTER (OUTPATIENT)
Dept: RADIOLOGY | Facility: HOSPITAL | Age: 63
Discharge: HOME | End: 2023-04-13
Attending: INTERNAL MEDICINE
Payer: COMMERCIAL

## 2023-04-13 ENCOUNTER — PATIENT MESSAGE (OUTPATIENT)
Dept: CARDIOLOGY | Facility: CLINIC | Age: 63
End: 2023-04-13
Payer: COMMERCIAL

## 2023-04-13 DIAGNOSIS — Z13.6 ENCOUNTER FOR SCREENING FOR CARDIOVASCULAR DISORDERS: ICD-10-CM

## 2023-04-13 PROCEDURE — 75571 CT HRT W/O DYE W/CA TEST: CPT | Mod: TC

## 2023-04-13 PROCEDURE — 75571 CT CALCIUM SCORING CARDIAC: ICD-10-PCS | Mod: 26,,, | Performed by: RADIOLOGY

## 2023-04-13 PROCEDURE — 75571 CT HRT W/O DYE W/CA TEST: CPT | Mod: 26,,, | Performed by: RADIOLOGY

## 2023-04-14 RX ORDER — ATORVASTATIN CALCIUM 20 MG/1
20 TABLET, FILM COATED ORAL DAILY
Qty: 90 TABLET | Refills: 3 | Status: SHIPPED | OUTPATIENT
Start: 2023-04-14 | End: 2023-12-01

## 2023-09-19 ENCOUNTER — PATIENT MESSAGE (OUTPATIENT)
Dept: UROLOGY | Facility: CLINIC | Age: 63
End: 2023-09-19
Payer: MEDICARE

## 2023-09-28 ENCOUNTER — LAB VISIT (OUTPATIENT)
Dept: LAB | Facility: HOSPITAL | Age: 63
End: 2023-09-28
Payer: MEDICARE

## 2023-09-28 DIAGNOSIS — R97.20 ELEVATED PSA: ICD-10-CM

## 2023-09-28 LAB — COMPLEXED PSA SERPL-MCNC: 7.5 NG/ML (ref 0–4)

## 2023-09-28 PROCEDURE — 84153 ASSAY OF PSA TOTAL: CPT | Performed by: NURSE PRACTITIONER

## 2023-09-28 PROCEDURE — 36415 COLL VENOUS BLD VENIPUNCTURE: CPT | Mod: PO | Performed by: NURSE PRACTITIONER

## 2023-10-02 ENCOUNTER — OFFICE VISIT (OUTPATIENT)
Dept: UROLOGY | Facility: CLINIC | Age: 63
End: 2023-10-02
Payer: MEDICARE

## 2023-10-02 VITALS
DIASTOLIC BLOOD PRESSURE: 82 MMHG | OXYGEN SATURATION: 96 % | BODY MASS INDEX: 27.01 KG/M2 | HEIGHT: 70 IN | HEART RATE: 59 BPM | SYSTOLIC BLOOD PRESSURE: 131 MMHG | WEIGHT: 188.69 LBS

## 2023-10-02 DIAGNOSIS — R97.20 ELEVATED PSA: Primary | ICD-10-CM

## 2023-10-02 PROCEDURE — 3008F PR BODY MASS INDEX (BMI) DOCUMENTED: ICD-10-PCS | Mod: CPTII,S$GLB,, | Performed by: NURSE PRACTITIONER

## 2023-10-02 PROCEDURE — 1159F PR MEDICATION LIST DOCUMENTED IN MEDICAL RECORD: ICD-10-PCS | Mod: CPTII,S$GLB,, | Performed by: NURSE PRACTITIONER

## 2023-10-02 PROCEDURE — 3079F PR MOST RECENT DIASTOLIC BLOOD PRESSURE 80-89 MM HG: ICD-10-PCS | Mod: CPTII,S$GLB,, | Performed by: NURSE PRACTITIONER

## 2023-10-02 PROCEDURE — 3008F BODY MASS INDEX DOCD: CPT | Mod: CPTII,S$GLB,, | Performed by: NURSE PRACTITIONER

## 2023-10-02 PROCEDURE — 1160F RVW MEDS BY RX/DR IN RCRD: CPT | Mod: CPTII,S$GLB,, | Performed by: NURSE PRACTITIONER

## 2023-10-02 PROCEDURE — 3075F PR MOST RECENT SYSTOLIC BLOOD PRESS GE 130-139MM HG: ICD-10-PCS | Mod: CPTII,S$GLB,, | Performed by: NURSE PRACTITIONER

## 2023-10-02 PROCEDURE — 99213 OFFICE O/P EST LOW 20 MIN: CPT | Mod: S$GLB,,, | Performed by: NURSE PRACTITIONER

## 2023-10-02 PROCEDURE — 87086 URINE CULTURE/COLONY COUNT: CPT | Performed by: NURSE PRACTITIONER

## 2023-10-02 PROCEDURE — 3075F SYST BP GE 130 - 139MM HG: CPT | Mod: CPTII,S$GLB,, | Performed by: NURSE PRACTITIONER

## 2023-10-02 PROCEDURE — 1160F PR REVIEW ALL MEDS BY PRESCRIBER/CLIN PHARMACIST DOCUMENTED: ICD-10-PCS | Mod: CPTII,S$GLB,, | Performed by: NURSE PRACTITIONER

## 2023-10-02 PROCEDURE — 99213 PR OFFICE/OUTPT VISIT, EST, LEVL III, 20-29 MIN: ICD-10-PCS | Mod: S$GLB,,, | Performed by: NURSE PRACTITIONER

## 2023-10-02 PROCEDURE — 1159F MED LIST DOCD IN RCRD: CPT | Mod: CPTII,S$GLB,, | Performed by: NURSE PRACTITIONER

## 2023-10-02 PROCEDURE — 3079F DIAST BP 80-89 MM HG: CPT | Mod: CPTII,S$GLB,, | Performed by: NURSE PRACTITIONER

## 2023-10-02 RX ORDER — CIPROFLOXACIN 500 MG/1
500 TABLET ORAL 2 TIMES DAILY
Qty: 4 TABLET | Refills: 0 | Status: SHIPPED | OUTPATIENT
Start: 2023-10-02 | End: 2023-10-04

## 2023-10-02 NOTE — PROGRESS NOTES
Subjective:      Gt Banerjee is a 63 y.o. male who returns today regarding his elevated PSA.    On flomax for LUTS since 2019. Recently increased to 0.8 mg.      Elevated PSA  Patient is here with an elevated PSA. He has no personal history and no family history of prostate cancer. He has no prior genitourinary history of hematuria, hematospermia, prostatitis, UTI, urolithiasis, epididymal orchitis, previous  surgery.  Previous PSA values are :  Component      Latest Ref Rng 7/31/2019 11/16/2020 6/8/2021 1/12/2023 1/18/2023 9/28/2023   PSA Total      0.00 - 4.00 ng/mL   2.8   7.2 (H)     PSA, Free      0.00 - 1.50 ng/mL   0.45   1.12     PSA, Free %      Not established %   16.07   15.56     PSA Diagnostic      0.00 - 4.00 ng/mL 3.5  4.0   6.6 (H)   7.5 (H)        Denies dysuria, hematuria, perineal/flank pain and fever/chills.     The following portions of the patient's history were reviewed and updated as appropriate: allergies, current medications, past family history, past medical history, past social history, past surgical history and problem list.    Review of Systems  Constitutional: no fever or chills  ENT: no nasal congestion or sore throat  Respiratory: no cough or shortness of breath  Cardiovascular: no chest pain or palpitations  Gastrointestinal: no nausea or vomiting, tolerating diet  Genitourinary: as per HPI  Hematologic/Lymphatic: no easy bruising or lymphadenopathy  Musculoskeletal: no arthralgias or myalgias  Neurological: no seizures or tremors  Behavioral/Psych: no auditory or visual hallucinations     Objective:   Vitals:   Vitals:    10/02/23 0804   BP: 131/82   Pulse: (!) 59     Physical Exam   General: alert and oriented, no acute distress  Head: normocephalic, atraumatic  Neck: supple, normal ROM  Respiratory: Symmetric expansion, non-labored breathing  Cardiovascular: regular rate and rhythm  Abdomen: soft, non tender, non distended  Genitourinary: deferred  Skin: normal coloration  "and turgor, no rashes, no suspicious skin lesions noted  Neuro: alert and oriented x3, no gross deficits  Psych: normal judgment and insight, normal mood/affect, and non-anxious    Lab Review   Urinalysis demonstrates negative for all components  Lab Results   Component Value Date    WBC 4.23 04/13/2023    HGB 15.4 04/13/2023    HCT 45.6 04/13/2023    MCV 97 04/13/2023     04/13/2023     Lab Results   Component Value Date    CREATININE 0.9 04/13/2023    BUN 22 04/13/2023     No results found for: "PSA"  Imaging   EXAMINATION:  MRI PROSTATE W W/O CONTRAST     CLINICAL HISTORY:  elevated PSA;  Elevated prostate specific antigen (PSA)     Additional history: None provided.     TECHNIQUE:  Multiparametric MRI of the prostate/pelvis performed on a 3T scanner with phase pelvic coil. Multiplanar, multisequence images including high resolution, small field-of-view T2-WI; axial diffusion weighted images with multiple B-values and creation of ADC-maps; and dynamic contrast enhanced T1-weighted images through the prostate were obtained before, during, and after the administration of 10 cc intravenous gadolinium.     COMPARISON:  None.     FINDINGS:  Previous biopsy: None     PSA: 7.2 ng/mL (01/18/2023)     Prior therapy: None     Prostate: 4.7 x 3.8 x 4.7 cm corresponding to a computed volume of 41.1 cc.     Examination degraded by patient motion artifact. Diffusion-weighted sequences are limited due to susceptibility artifact from adjacent rectal gas.     Peripheral zone: No focal abnormalities with imaging features concerning for prostate cancer, score 1.     Transitional zone: Allowing for limitations, benign prostatic hyperplasia without focal suspicious abnormality, score 2.     Neurovascular bundle: Normal appearance.     Seminal vesicles: Normal appearance.     Adjacent Organ Involvement: No evidence for urinary bladder or rectal invasion.     Lymphadenopathy: None.     Other Findings: Small fat-containing " inguinal hernias.     Impression:  Examination limitations as above.  Benign prostatic hyperplasia.  Allowing for limitations, no findings concerning for prostate malignancy.  Overall Assessment: PI-RADS 2 - Low (clinically significant cancer is unlikely to be present)  Number of targets created for potential MR/US fusion biopsy  Peripheral zone: 0  Transition zone: 0    Assessment:     1. Elevated PSA      Plan:   Gt was seen today for follow-up.    Diagnoses and all orders for this visit:    Elevated PSA  -     Urine culture  -     ciprofloxacin HCl (CIPRO) 500 MG tablet; Take 1 tablet (500 mg total) by mouth 2 (two) times daily. for 4 doses  -     Transrectal Ultrasound w/ Biopsy; Future    Plan:  --We discussed this significance of his elevated PSA, various benign etiologies including BPH and infection, and the associated risk of prostate cancer.   --MRI prostate- PIRADS 2; however with rising PSA will proceed with TRUS/bx  --Instruction handout provided

## 2023-10-03 ENCOUNTER — TELEPHONE (OUTPATIENT)
Dept: UROLOGY | Facility: CLINIC | Age: 63
End: 2023-10-03
Payer: MEDICARE

## 2023-10-03 LAB — BACTERIA UR CULT: NORMAL

## 2023-10-06 ENCOUNTER — TELEPHONE (OUTPATIENT)
Dept: CARDIOLOGY | Facility: CLINIC | Age: 63
End: 2023-10-06

## 2023-10-06 ENCOUNTER — PATIENT MESSAGE (OUTPATIENT)
Dept: CARDIOLOGY | Facility: CLINIC | Age: 63
End: 2023-10-06
Payer: MEDICARE

## 2023-10-22 ENCOUNTER — PATIENT MESSAGE (OUTPATIENT)
Dept: UROLOGY | Facility: CLINIC | Age: 63
End: 2023-10-22
Payer: MEDICARE

## 2023-10-24 ENCOUNTER — PROCEDURE VISIT (OUTPATIENT)
Dept: UROLOGY | Facility: CLINIC | Age: 63
End: 2023-10-24
Payer: MEDICARE

## 2023-10-24 VITALS
HEART RATE: 59 BPM | HEIGHT: 70 IN | BODY MASS INDEX: 27.24 KG/M2 | WEIGHT: 190.25 LBS | TEMPERATURE: 98 F | OXYGEN SATURATION: 96 % | RESPIRATION RATE: 16 BRPM | SYSTOLIC BLOOD PRESSURE: 113 MMHG | DIASTOLIC BLOOD PRESSURE: 79 MMHG

## 2023-10-24 DIAGNOSIS — R97.20 ELEVATED PSA: ICD-10-CM

## 2023-10-24 PROCEDURE — 88341 PR IHC OR ICC EACH ADD'L SINGLE ANTIBODY  STAINPR: ICD-10-PCS | Mod: 26,HCNC,, | Performed by: PATHOLOGY

## 2023-10-24 PROCEDURE — 55700 TRANSRECTAL ULTRASOUND W/ BIOPSY: ICD-10-PCS | Mod: HCNC,S$GLB,, | Performed by: UROLOGY

## 2023-10-24 PROCEDURE — 76872 TRANSRECTAL ULTRASOUND W/ BIOPSY: ICD-10-PCS | Mod: 26,HCNC,S$GLB, | Performed by: UROLOGY

## 2023-10-24 PROCEDURE — G0416 PROSTATE BIOPSY, ANY MTHD: ICD-10-PCS | Mod: 26,HCNC,, | Performed by: PATHOLOGY

## 2023-10-24 PROCEDURE — G0416 PROSTATE BIOPSY, ANY MTHD: HCPCS | Mod: 26,HCNC,, | Performed by: PATHOLOGY

## 2023-10-24 PROCEDURE — 96372 PR INJECTION,THERAP/PROPH/DIAG2ST, IM OR SUBCUT: ICD-10-PCS | Mod: HCNC,59,S$GLB, | Performed by: UROLOGY

## 2023-10-24 PROCEDURE — 88341 IMHCHEM/IMCYTCHM EA ADD ANTB: CPT | Mod: 59,HCNC | Performed by: PATHOLOGY

## 2023-10-24 PROCEDURE — 88342 IMHCHEM/IMCYTCHM 1ST ANTB: CPT | Mod: HCNC | Performed by: PATHOLOGY

## 2023-10-24 PROCEDURE — 88341 IMHCHEM/IMCYTCHM EA ADD ANTB: CPT | Mod: 26,HCNC,, | Performed by: PATHOLOGY

## 2023-10-24 PROCEDURE — 76872 US TRANSRECTAL: CPT | Mod: 26,HCNC,S$GLB, | Performed by: UROLOGY

## 2023-10-24 PROCEDURE — 55700 TRANSRECTAL ULTRASOUND W/ BIOPSY: CPT | Mod: HCNC,S$GLB,, | Performed by: UROLOGY

## 2023-10-24 PROCEDURE — 88342 IMHCHEM/IMCYTCHM 1ST ANTB: CPT | Mod: 26,HCNC,, | Performed by: PATHOLOGY

## 2023-10-24 PROCEDURE — 88305 TISSUE EXAM BY PATHOLOGIST: CPT | Mod: 59,HCNC | Performed by: PATHOLOGY

## 2023-10-24 PROCEDURE — 88342 CHG IMMUNOCYTOCHEMISTRY: ICD-10-PCS | Mod: 26,HCNC,, | Performed by: PATHOLOGY

## 2023-10-24 PROCEDURE — 96372 THER/PROPH/DIAG INJ SC/IM: CPT | Mod: HCNC,59,S$GLB, | Performed by: UROLOGY

## 2023-10-24 RX ORDER — LIDOCAINE HYDROCHLORIDE 10 MG/ML
20 INJECTION INFILTRATION; PERINEURAL
Status: COMPLETED | OUTPATIENT
Start: 2023-10-24 | End: 2023-10-24

## 2023-10-24 RX ORDER — GENTAMICIN SULFATE 40 MG/ML
80 INJECTION, SOLUTION INTRAMUSCULAR; INTRAVENOUS
Status: COMPLETED | OUTPATIENT
Start: 2023-10-24 | End: 2023-10-24

## 2023-10-24 RX ADMIN — LIDOCAINE HYDROCHLORIDE 20 ML: 10 INJECTION INFILTRATION; PERINEURAL at 09:10

## 2023-10-24 RX ADMIN — GENTAMICIN SULFATE 80 MG: 40 INJECTION, SOLUTION INTRAMUSCULAR; INTRAVENOUS at 08:10

## 2023-10-24 NOTE — PROCEDURES
"Transrectal Ultrasound w/ Biopsy    Date/Time: 10/24/2023 9:09 AM    Performed by: Jonas Moncada MD  Authorized by: Jessica Valencia NP    Consent Done?:  Yes (Written)  Time out: Immediately prior to procedure a "time out" was called to verify the correct patient, procedure, equipment, support staff and site/side marked as required.    Indications: Elevated PSA    Preparation: Patient was prepped and draped in usual sterile fashion    Position:  Left lateral  Anesthesia:  Pudendal nerve block, Lidocaine jelly and 20cc's 1% Lidocaine  Patient sedated: No    Prostate Size:  44  Lesions:: No    Left Base Biopsies: 0  Left Mid Biopsies: 0  Left Onset Biopsies: 2  Right Base Biopsies: 2  Right Mid Biopsies: 2  Right Onset Biopsies: 2  Total Biopsies:  8    Patient tolerance:  Patient tolerated the procedure well with no immediate complications     Urine cs neg  MR neg  Bleeding encountered; resolved with direct pressure from probe; given this and the neg MR, only 8 cores were taken  Cipro and gent and enemas done    Elevated psa    RTC 2 weeks to discuss path  Standard instructions given      "

## 2023-11-06 ENCOUNTER — OFFICE VISIT (OUTPATIENT)
Dept: UROLOGY | Facility: CLINIC | Age: 63
End: 2023-11-06
Payer: MEDICARE

## 2023-11-06 VITALS
SYSTOLIC BLOOD PRESSURE: 144 MMHG | BODY MASS INDEX: 27.76 KG/M2 | HEART RATE: 53 BPM | HEIGHT: 70 IN | OXYGEN SATURATION: 94 % | RESPIRATION RATE: 16 BRPM | DIASTOLIC BLOOD PRESSURE: 82 MMHG | WEIGHT: 193.88 LBS

## 2023-11-06 DIAGNOSIS — C61 PROSTATE CANCER: Primary | ICD-10-CM

## 2023-11-06 DIAGNOSIS — N13.8 BPH WITH OBSTRUCTION/LOWER URINARY TRACT SYMPTOMS: ICD-10-CM

## 2023-11-06 DIAGNOSIS — N40.1 BPH WITH OBSTRUCTION/LOWER URINARY TRACT SYMPTOMS: ICD-10-CM

## 2023-11-06 PROCEDURE — 3008F BODY MASS INDEX DOCD: CPT | Mod: HCNC,CPTII,S$GLB, | Performed by: UROLOGY

## 2023-11-06 PROCEDURE — 1101F PT FALLS ASSESS-DOCD LE1/YR: CPT | Mod: HCNC,CPTII,S$GLB, | Performed by: UROLOGY

## 2023-11-06 PROCEDURE — 3008F PR BODY MASS INDEX (BMI) DOCUMENTED: ICD-10-PCS | Mod: HCNC,CPTII,S$GLB, | Performed by: UROLOGY

## 2023-11-06 PROCEDURE — 3077F PR MOST RECENT SYSTOLIC BLOOD PRESSURE >= 140 MM HG: ICD-10-PCS | Mod: HCNC,CPTII,S$GLB, | Performed by: UROLOGY

## 2023-11-06 PROCEDURE — 1159F PR MEDICATION LIST DOCUMENTED IN MEDICAL RECORD: ICD-10-PCS | Mod: HCNC,CPTII,S$GLB, | Performed by: UROLOGY

## 2023-11-06 PROCEDURE — 1159F MED LIST DOCD IN RCRD: CPT | Mod: HCNC,CPTII,S$GLB, | Performed by: UROLOGY

## 2023-11-06 PROCEDURE — 3079F PR MOST RECENT DIASTOLIC BLOOD PRESSURE 80-89 MM HG: ICD-10-PCS | Mod: HCNC,CPTII,S$GLB, | Performed by: UROLOGY

## 2023-11-06 PROCEDURE — 3079F DIAST BP 80-89 MM HG: CPT | Mod: HCNC,CPTII,S$GLB, | Performed by: UROLOGY

## 2023-11-06 PROCEDURE — 3288F FALL RISK ASSESSMENT DOCD: CPT | Mod: HCNC,CPTII,S$GLB, | Performed by: UROLOGY

## 2023-11-06 PROCEDURE — 1101F PR PT FALLS ASSESS DOC 0-1 FALLS W/OUT INJ PAST YR: ICD-10-PCS | Mod: HCNC,CPTII,S$GLB, | Performed by: UROLOGY

## 2023-11-06 PROCEDURE — 99215 OFFICE O/P EST HI 40 MIN: CPT | Mod: HCNC,S$GLB,, | Performed by: UROLOGY

## 2023-11-06 PROCEDURE — 3288F PR FALLS RISK ASSESSMENT DOCUMENTED: ICD-10-PCS | Mod: HCNC,CPTII,S$GLB, | Performed by: UROLOGY

## 2023-11-06 PROCEDURE — 3077F SYST BP >= 140 MM HG: CPT | Mod: HCNC,CPTII,S$GLB, | Performed by: UROLOGY

## 2023-11-06 PROCEDURE — 99215 PR OFFICE/OUTPT VISIT, EST, LEVL V, 40-54 MIN: ICD-10-PCS | Mod: HCNC,S$GLB,, | Performed by: UROLOGY

## 2023-11-06 RX ORDER — AMOXICILLIN 500 MG/1
1000 CAPSULE ORAL 2 TIMES DAILY
COMMUNITY
Start: 2023-10-05 | End: 2023-12-05

## 2023-11-06 NOTE — Clinical Note
Send Prolaris genetic testing then see Dr Moncada and Dr Eaton at [facility] in 1 month.  thanks

## 2023-11-06 NOTE — PROGRESS NOTES
Subjective:      Gt Banerjee is a 63 y.o. male who returns today regarding his     Here to discuss path    No complications    Doing well.    The following portions of the patient's history were reviewed and updated as appropriate: allergies, current medications, past family history, past medical history, past social history, past surgical history and problem list.    Review of Systems  Pertinent items are noted in HPI.  A comprehensive multipoint review of systems was negative except as otherwise stated in the HPI.    Past Medical History:   Diagnosis Date    Stroke      Past Surgical History:   Procedure Laterality Date    TRANSESOPHAGEAL ECHOCARDIOGRAPHY  2/17/2021    Procedure: Echocardiogram, Transesophageal;  Surgeon: Nhung Humphreys MD;  Location: [facility] CATH LAB;  Service: Cardiology;;    VASECTOMY         Review of patient's allergies indicates:  No Known Allergies       Objective:   Vitals: There were no vitals taken for this visit.    Physical Exam   General: alert and oriented, no acute distress  Respiratory: Symmetric expansion, non-labored breathing  Cardiovascular: no peripheral edema  Abdomen: soft, non distended  Skin: normal coloration and turgor, no rashes, no suspicious skin lesions noted  Neuro: no gross deficits  Psych: normal judgment and insight, normal mood/affect, and non-anxious    Physical Exam    Lab Review   Urinalysis demonstrates no specimen    Lab Results   Component Value Date    WBC 4.23 04/13/2023    HGB 15.4 04/13/2023    HCT 45.6 04/13/2023    MCV 97 04/13/2023     04/13/2023     Lab Results   Component Value Date    CREATININE 0.9 04/13/2023    BUN 22 04/13/2023     Lab Results   Component Value Date    PSADIAG 7.5 (H) 09/28/2023    PSADIAG 6.6 (H) 01/12/2023    PSADIAG 4.0 11/16/2020    PSATOTAL 7.2 (H) 01/18/2023    PSATOTAL 2.8 06/08/2021    PSAFREE 1.12 01/18/2023    PSAFREE 0.45 06/08/2021    PSAFREEPCT 15.56 01/18/2023    PSAFREEPCT 16.07 06/08/2021     Final  Pathologic Diagnosis 1. PROSTATE, RIGHT BASE, NEEDLE CORE BIOPSIES:   -  Focal simple and cystic glandular atrophy.     -  No evidence of high-grade prostatic intraepithelial neoplasia (PIN 3) or malignancy.     2. PROSTATE, RIGHT MIDDLE, NEEDLE CORE BIOPSIES:   -  Focal simple and cystic glandular atrophy.     -  No evidence of high-grade prostatic intraepithelial neoplasia (PIN 3) or malignancy.     3. PROSTATE, RIGHT APEX NEEDLE CORE BIOPSIES:   -  Focal simple glandular atrophy.     -  Focal basal cell hyperplasia.   -  No evidence of high-grade prostatic intraepithelial neoplasia (PIN 3) or malignancy.     4. PROSTATE, LEFT APEX, NEEDLE CORE BIOPSIES:     -  Acinar adenocarcinoma, grade Group 2 ([place] score 3+4=7) in 2 out of 2 cores.           Percentage of pattern 4:  35%.             Percentage of prostatic tissue involved by tumor:  8%.           Periprostatic fat invasion: Not identified.             Perineural invasion:  Present.             Intraductal carcinoma (IDC):  Not identified.           Cribriform glands:  Present.     -  Focal simple glandular atrophy.   -  Focal chronic prostatitis.    Comment: Interp By Danuta Perdomo MD, Signed on 11/04/2023 at 14:43       Imaging  MRI PROSTATE W W/O CONTRAST     CLINICAL HISTORY:  elevated PSA;  Elevated prostate specific antigen (PSA)     Additional history: None provided.     TECHNIQUE:  Multiparametric MRI of the prostate/pelvis performed on a 3T scanner with phase pelvic coil. Multiplanar, multisequence images including high resolution, small field-of-view T2-WI; axial diffusion weighted images with multiple B-values and creation of ADC-maps; and dynamic contrast enhanced T1-weighted images through the prostate were obtained before, during, and after the administration of 10 cc intravenous gadolinium.     COMPARISON:  None.     FINDINGS:  Previous biopsy: None     PSA: 7.2 ng/mL (01/18/2023)     Prior therapy: None     Prostate: 4.7 x 3.8 x 4.7  cm corresponding to a computed volume of 41.1 cc.     Examination degraded by patient motion artifact. Diffusion-weighted sequences are limited due to susceptibility artifact from adjacent rectal gas.     Peripheral zone: No focal abnormalities with imaging features concerning for prostate cancer, score 1.        Transitional zone: Allowing for limitations, benign prostatic hyperplasia without focal suspicious abnormality, score 2.     Neurovascular bundle: Normal appearance.     Seminal vesicles: Normal appearance.     Adjacent Organ Involvement: No evidence for urinary bladder or rectal invasion.     Lymphadenopathy: None.     Other Findings: Small fat-containing inguinal hernias.     Impression:     Examination limitations as above.     Benign prostatic hyperplasia.  Allowing for limitations, no findings concerning for prostate malignancy.     Overall Assessment: PI-RADS 2 - Low (clinically significant cancer is unlikely to be present)     Number of targets created for potential MR/US fusion biopsy     Peripheral zone: 0     Transition zone: 0     Electronically signed by resident: Milton Fatima  Date:                                            02/22/2023  Time:                                           14:50     Electronically signed by: Gt Llamas Jr  Date:                                            02/22/2023  Time:                                           16:30  Assessment and Plan:   Prostate cancer  [place] 7 3+4 GG2 cT1c cN0 cMx; psa 7.5 favorable intermediate risk    We discussed that he has prostate cancer and the low grade and stage of his disease.  We discussed the risks and benefits of sugery, radiation, chemotherapy, hormonal therapy, and combinations of these.  We discussed open and robotic surgical approaches.  We discussed that active surveillance and confirmatory biopsy or MR if he chooses this approach.  We discussed referral to radiation oncology and medical oncology.    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

## 2023-11-12 ENCOUNTER — PATIENT MESSAGE (OUTPATIENT)
Dept: UROLOGY | Facility: CLINIC | Age: 63
End: 2023-11-12
Payer: MEDICARE

## 2023-11-29 RX ORDER — TAMSULOSIN HYDROCHLORIDE 0.4 MG/1
0.4 CAPSULE ORAL DAILY
Qty: 180 CAPSULE | Refills: 0 | Status: SHIPPED | OUTPATIENT
Start: 2023-11-29

## 2023-12-01 RX ORDER — ATORVASTATIN CALCIUM 20 MG/1
20 TABLET, FILM COATED ORAL DAILY
Qty: 90 TABLET | Refills: 3 | Status: SHIPPED | OUTPATIENT
Start: 2023-12-01

## 2023-12-05 ENCOUNTER — PATIENT MESSAGE (OUTPATIENT)
Dept: UROLOGY | Facility: CLINIC | Age: 63
End: 2023-12-05
Payer: MEDICARE

## 2023-12-05 ENCOUNTER — OFFICE VISIT (OUTPATIENT)
Dept: DERMATOLOGY | Facility: CLINIC | Age: 63
End: 2023-12-05
Payer: MEDICARE

## 2023-12-05 DIAGNOSIS — L91.8 SKIN TAG: ICD-10-CM

## 2023-12-05 DIAGNOSIS — D18.01 CHERRY ANGIOMA: ICD-10-CM

## 2023-12-05 DIAGNOSIS — L57.0 AK (ACTINIC KERATOSIS): Primary | ICD-10-CM

## 2023-12-05 DIAGNOSIS — L82.1 SEBORRHEIC KERATOSES: ICD-10-CM

## 2023-12-05 DIAGNOSIS — L81.4 LENTIGO: ICD-10-CM

## 2023-12-05 DIAGNOSIS — D22.9 MULTIPLE BENIGN NEVI: ICD-10-CM

## 2023-12-05 DIAGNOSIS — B36.0 TINEA VERSICOLOR: ICD-10-CM

## 2023-12-05 PROCEDURE — 1101F PT FALLS ASSESS-DOCD LE1/YR: CPT | Mod: HCNC,CPTII,S$GLB, | Performed by: DERMATOLOGY

## 2023-12-05 PROCEDURE — 1159F PR MEDICATION LIST DOCUMENTED IN MEDICAL RECORD: ICD-10-PCS | Mod: HCNC,CPTII,S$GLB, | Performed by: DERMATOLOGY

## 2023-12-05 PROCEDURE — 99999 PR PBB SHADOW E&M-EST. PATIENT-LVL III: CPT | Mod: PBBFAC,HCNC,, | Performed by: DERMATOLOGY

## 2023-12-05 PROCEDURE — 99204 PR OFFICE/OUTPT VISIT, NEW, LEVL IV, 45-59 MIN: ICD-10-PCS | Mod: 25,HCNC,S$GLB, | Performed by: DERMATOLOGY

## 2023-12-05 PROCEDURE — 17000 DESTRUCT PREMALG LESION: CPT | Mod: HCNC,S$GLB,, | Performed by: DERMATOLOGY

## 2023-12-05 PROCEDURE — 1101F PR PT FALLS ASSESS DOC 0-1 FALLS W/OUT INJ PAST YR: ICD-10-PCS | Mod: HCNC,CPTII,S$GLB, | Performed by: DERMATOLOGY

## 2023-12-05 PROCEDURE — 17003 DESTRUCT PREMALG LES 2-14: CPT | Mod: HCNC,S$GLB,, | Performed by: DERMATOLOGY

## 2023-12-05 PROCEDURE — 3288F FALL RISK ASSESSMENT DOCD: CPT | Mod: HCNC,CPTII,S$GLB, | Performed by: DERMATOLOGY

## 2023-12-05 PROCEDURE — 17003 DESTRUCTION, PREMALIGNANT LESIONS; SECOND THROUGH 14 LESIONS: ICD-10-PCS | Mod: HCNC,S$GLB,, | Performed by: DERMATOLOGY

## 2023-12-05 PROCEDURE — 1159F MED LIST DOCD IN RCRD: CPT | Mod: HCNC,CPTII,S$GLB, | Performed by: DERMATOLOGY

## 2023-12-05 PROCEDURE — 3288F PR FALLS RISK ASSESSMENT DOCUMENTED: ICD-10-PCS | Mod: HCNC,CPTII,S$GLB, | Performed by: DERMATOLOGY

## 2023-12-05 PROCEDURE — 17000 PR DESTRUCTION(LASER SURGERY,CRYOSURGERY,CHEMOSURGERY),PREMALIGNANT LESIONS,FIRST LESION: ICD-10-PCS | Mod: HCNC,S$GLB,, | Performed by: DERMATOLOGY

## 2023-12-05 PROCEDURE — 99999 PR PBB SHADOW E&M-EST. PATIENT-LVL III: ICD-10-PCS | Mod: PBBFAC,HCNC,, | Performed by: DERMATOLOGY

## 2023-12-05 PROCEDURE — 99204 OFFICE O/P NEW MOD 45 MIN: CPT | Mod: 25,HCNC,S$GLB, | Performed by: DERMATOLOGY

## 2023-12-05 RX ORDER — KETOCONAZOLE 20 MG/G
CREAM TOPICAL
Qty: 60 G | Refills: 3 | Status: SHIPPED | OUTPATIENT
Start: 2023-12-05

## 2023-12-05 NOTE — PATIENT INSTRUCTIONS
Tinea versicolor  -     ketoconazole (NIZORAL) 2 % cream; AAA bid  Dispense: 60 g; Refill: 3  Handout given nature of disease explained that oily skin and sweating can exacerbate condition  Start Vanicream Z-bar soap or sebamed daily to prevent future reoccurances    CRYOSURGERY      Your doctor has used a method called cryosurgery to treat your skin condition. Cryosurgery refers to the use of very cold substances to treat a variety of skin conditions such as warts, pre-skin cancers, molluscum contagiosum, sun spots, and several benign growths. The substance we use in cryosurgery is liquid nitrogen and is so cold (-195 degrees Celsius) that is burns when administered.     Following treatment in the office, the skin may immediately burn and become red. You may find the area around the lesion is affected as well. It is sometimes necessary to treat not only the lesion, but a small area of the surrounding normal skin to achieve a good response.     A blister, and even a blood filled blister, may form after treatment.   This is a normal response. If the blister is painful, it is acceptable to sterilize a needle and with rubbing alcohol and gently pop the blister. It is important that you gently wash the area with soap and warm water as the blister fluid may contain wart virus if a wart was treated. Do no remove the roof of the blister.     The area treated can take anywhere from 1-3 weeks to heal. Healing time depends on the kind skin lesion treated, the location, and how aggressively the lesion was treated. It is recommended that the areas treated are covered with Vaseline or bacitracin ointment and a band-aid. If a band-aid is not practical, just ointment applied several times per day will do. Keeping these areas moist will speed the healing time.    Treatment with liquid nitrogen can leave a scar. In dark skin, it may be a light or dark scar, in light skin it may be a white or pink scar. These will generally fade with  time, but may never go away completely.     If you have any concerns after your treatment, please feel free to call the office.       [facility] [place] [address]/ [phone] FAX/ [email or web address]

## 2023-12-05 NOTE — PROGRESS NOTES
Subjective:      Patient ID:  Gt Banerjee is a 63 y.o. male who presents for   Chief Complaint   Patient presents with    Skin Check     TBSE      Patient here for Total Body Skin Exam    none - personal history of atypical moles removed  none - personal history of MM   none - family history of MM  none - childhood blistering sunburns  none - tanning bed use  none - personal history of NMSC    Patient with specific complaint of lesion(s)  Location: L-shoulder   Duration: >1 yrs   Symptoms: slightly growing   Relieving factors/Previous treatments: none     Patient with new complaint of lesion  Location: L-palm  Duration: >2 yrs   Symptoms: hard to touch; if removed, the lesion grows back   Relieving factors/Previous treatments: none            Review of Systems   Skin:  Positive for activity-related sunscreen use and wears hat. Negative for daily sunscreen use and recent sunburn.   Hematologic/Lymphatic: Does not bruise/bleed easily.       Objective:   Physical Exam   Constitutional: He appears well-developed and well-nourished. No distress.   Neurological: He is alert and oriented to person, place, and time. He is not disoriented.   Psychiatric: He has a normal mood and affect.   Skin:   Areas Examined (abnormalities noted in diagram):   Scalp / Hair Palpated and Inspected  Head / Face Inspection Performed  Neck Inspection Performed  Chest / Axilla Inspection Performed  Abdomen Inspection Performed  Genitals / Buttocks / Groin Inspection Performed  Back Inspection Performed  RUE Inspected  LUE Inspection Performed  RLE Inspected  LLE Inspection Performed  Nails and Digits Inspection Performed                 Diagram Legend     Erythematous scaling macule/papule c/w actinic keratosis       Vascular papule c/w angioma      Pigmented verrucoid papule/plaque c/w seborrheic keratosis      Yellow umbilicated papule c/w sebaceous hyperplasia      Irregularly shaped tan macule c/w lentigo     1-2 mm smooth white  papules consistent with Milia      Movable subcutaneous cyst with punctum c/w epidermal inclusion cyst      Subcutaneous movable cyst c/w pilar cyst      Firm pink to brown papule c/w dermatofibroma      Pedunculated fleshy papule(s) c/w skin tag(s)      Evenly pigmented macule c/w junctional nevus     Mildly variegated pigmented, slightly irregular-bordered macule c/w mildly atypical nevus      Flesh colored to evenly pigmented papule c/w intradermal nevus       Pink pearly papule/plaque c/w basal cell carcinoma      Erythematous hyperkeratotic cursted plaque c/w SCC      Surgical scar with no sign of skin cancer recurrence      Open and closed comedones      Inflammatory papules and pustules      Verrucoid papule consistent consistent with wart     Erythematous eczematous patches and plaques     Dystrophic onycholytic nail with subungual debris c/w onychomycosis     Umbilicated papule    Erythematous-base heme-crusted tan verrucoid plaque consistent with inflamed seborrheic keratosis     Erythematous Silvery Scaling Plaque c/w Psoriasis     See annotation      Assessment / Plan:        AK (actinic keratosis)  -     Photodynamic Therapy; Future  Cryosurgery Procedure Note    Verbal consent from the patient is obtained including, but not limited to, risk of hypopigmentation/hyperpigmentation, scar, recurrence of lesion. The patient is aware of the precancerous quality and need for treatment of these lesions. Liquid nitrogen cryosurgery is applied to the 5 actinic keratoses, as detailed in the physical exam, to produce a freeze injury. The patient is aware that blisters may form and is instructed on wound care with gentle cleansing and use of vaseline ointment to keep moist until healed. The patient is supplied a handout on cryosurgery and is instructed to call if lesions do not completely resolve.     Patient was counseled on Photodynamic therapy and that it is a 1 day procedure where a chemical is painted on skin  and sun damage is brought to surface, it can be painful and cause skin to be red the healing time is approximately 2-4 weeks.         Skin tag  -     Ambulatory referral/consult to Plastic Surgery; Future; Expected date: 12/12/2023  Reassurance given to patient. No treatment is necessary.   Treatment of benign, asymptomatic lesions may be considered cosmetic.    Seborrheic keratoses  These are benign inherited growths without a malignant potential. Reassurance given to patient. No treatment is necessary.     Cherry angioma  This is a benign vascular lesion. Reassurance given. No treatment required.     Lentigo  This is a benign hyperpigmented sun induced lesion. Recommend daily sun protection/avoidance and use of at least SPF 30, broad spectrum sunscreen (OTC drug) will reduce the number of new lesions. Treatment of these benign lesions are considered cosmetic.    Multiple benign nevi  Total body skin examination performed today including at least 12 points as noted in physical examination. No lesions suspicious for malignancy noted.  Reassurance provided.    Instructed patient to observe lesion(s) for changes and follow up in clinic if changes are noted. Patient to monitor skin at home for new or changing lesions and follow up in clinic if noted.    Discussed ABCDE's of moles and brochure provided.    Tinea versicolor  -     ketoconazole (NIZORAL) 2 % cream; AAA bid  Dispense: 60 g; Refill: 3  Handout given nature of disease explained that oily skin and sweating can exacerbate condition  Start Vanicream Z-bar soap or sebamed daily to prevent future reoccurances               Follow up in about 3 months (around 3/5/2024) for after PDT.

## 2023-12-06 ENCOUNTER — TELEPHONE (OUTPATIENT)
Dept: OPHTHALMOLOGY | Facility: CLINIC | Age: 63
End: 2023-12-06
Payer: MEDICARE

## 2023-12-13 LAB
FINAL PATHOLOGIC DIAGNOSIS: NORMAL
GROSS: NORMAL
Lab: NORMAL
MICROSCOPIC EXAM: NORMAL
SUPPLEMENTAL DIAGNOSIS: NORMAL

## 2023-12-13 NOTE — PROGRESS NOTES
[facility]  Multidisciplinary Urologic Oncology Clinic  [facility]  Urology           Subjective:      Gt Banerjee is a 63 y.o. male who returns today regarding his      Here to discuss path     No complications     Doing well.     The following portions of the patient's history were reviewed and updated as appropriate: allergies, current medications, past family history, past medical history, past social history, past surgical history and problem list.     Review of Systems  Pertinent items are noted in HPI.  A comprehensive multipoint review of systems was negative except as otherwise stated in the HPI.          Past Medical History:   Diagnosis Date    Stroke              Past Surgical History:   Procedure Laterality Date    TRANSESOPHAGEAL ECHOCARDIOGRAPHY   2/17/2021     Procedure: Echocardiogram, Transesophageal;  Surgeon: Nhung Humphreys MD;  Location: [facility] CATH LAB;  Service: Cardiology;;    VASECTOMY             Review of patient's allergies indicates:  No Known Allergies        Objective:   Vitals: There were no vitals taken for this visit.     Physical Exam   General: alert and oriented, no acute distress  Respiratory: Symmetric expansion, non-labored breathing  Cardiovascular: no peripheral edema  Abdomen: soft, non distended  Skin: normal coloration and turgor, no rashes, no suspicious skin lesions noted  Neuro: no gross deficits  Psych: normal judgment and insight, normal mood/affect, and non-anxious     Physical Exam     Lab Review   Urinalysis demonstrates no specimen           Lab Results   Component Value Date     WBC 4.23 04/13/2023     HGB 15.4 04/13/2023     HCT 45.6 04/13/2023     MCV 97 04/13/2023      04/13/2023            Lab Results   Component Value Date     CREATININE 0.9 04/13/2023     BUN 22 04/13/2023            Lab Results   Component Value Date     PSADIAG 7.5 (H) 09/28/2023     PSADIAG 6.6 (H) 01/12/2023     PSADIAG 4.0 11/16/2020     PSATOTAL 7.2  (H) 01/18/2023     PSATOTAL 2.8 06/08/2021     PSAFREE 1.12 01/18/2023     PSAFREE 0.45 06/08/2021     PSAFREEPCT 15.56 01/18/2023     PSAFREEPCT 16.07 06/08/2021      Final Pathologic Diagnosis 1. PROSTATE, RIGHT BASE, NEEDLE CORE BIOPSIES:   -  Focal simple and cystic glandular atrophy.     -  No evidence of high-grade prostatic intraepithelial neoplasia (PIN 3) or malignancy.     2. PROSTATE, RIGHT MIDDLE, NEEDLE CORE BIOPSIES:   -  Focal simple and cystic glandular atrophy.     -  No evidence of high-grade prostatic intraepithelial neoplasia (PIN 3) or malignancy.     3. PROSTATE, RIGHT APEX NEEDLE CORE BIOPSIES:   -  Focal simple glandular atrophy.     -  Focal basal cell hyperplasia.   -  No evidence of high-grade prostatic intraepithelial neoplasia (PIN 3) or malignancy.     4. PROSTATE, LEFT APEX, NEEDLE CORE BIOPSIES:     -  Acinar adenocarcinoma, grade Group 2 ([place] score 3+4=7) in 2 out of 2 cores.           Percentage of pattern 4:  35%.             Percentage of prostatic tissue involved by tumor:  8%.           Periprostatic fat invasion: Not identified.             Perineural invasion:  Present.             Intraductal carcinoma (IDC):  Not identified.           Cribriform glands:  Present.     -  Focal simple glandular atrophy.   -  Focal chronic prostatitis.    Comment: Interp By Danuta Perdomo MD, Signed on 11/04/2023 at 14:43           Imaging  MRI PROSTATE W W/O CONTRAST     CLINICAL HISTORY:  elevated PSA;  Elevated prostate specific antigen (PSA)     Additional history: None provided.     TECHNIQUE:  Multiparametric MRI of the prostate/pelvis performed on a 3T scanner with phase pelvic coil. Multiplanar, multisequence images including high resolution, small field-of-view T2-WI; axial diffusion weighted images with multiple B-values and creation of ADC-maps; and dynamic contrast enhanced T1-weighted images through the prostate were obtained before, during, and after the administration  of 10 cc intravenous gadolinium.     COMPARISON:  None.     FINDINGS:  Previous biopsy: None     PSA: 7.2 ng/mL (01/18/2023)     Prior therapy: None     Prostate: 4.7 x 3.8 x 4.7 cm corresponding to a computed volume of 41.1 cc.     Examination degraded by patient motion artifact. Diffusion-weighted sequences are limited due to susceptibility artifact from adjacent rectal gas.     Peripheral zone: No focal abnormalities with imaging features concerning for prostate cancer, score 1.        Transitional zone: Allowing for limitations, benign prostatic hyperplasia without focal suspicious abnormality, score 2.     Neurovascular bundle: Normal appearance.     Seminal vesicles: Normal appearance.     Adjacent Organ Involvement: No evidence for urinary bladder or rectal invasion.     Lymphadenopathy: None.     Other Findings: Small fat-containing inguinal hernias.     Impression:     Examination limitations as above.     Benign prostatic hyperplasia.  Allowing for limitations, no findings concerning for prostate malignancy.     Overall Assessment: PI-RADS 2 - Low (clinically significant cancer is unlikely to be present)     Number of targets created for potential MR/US fusion biopsy     Peripheral zone: 0     Transition zone: 0     Electronically signed by resident: Milton Fatima  Date:                                            02/22/2023  Time:                                           14:50     Electronically signed by: Gt Llamas Jr  Date:                                            02/22/2023  Time:                                           16:30        Assessment and Plan:   Prostate cancer  [place] 7 3+4 GG2 cT1c cN0 cMx; psa 7.5 favorable intermediate risk     We discussed that he has prostate cancer and the low grade and stage of his disease.  We discussed the risks and benefits of sugery, radiation, chemotherapy, hormonal therapy, and combinations of these.  We discussed open and robotic surgical  approaches.  We discussed that active surveillance and confirmatory biopsy or MR if he chooses this approach.  We discussed referral to radiation oncology and medical oncology.     See Dr Eaton this afternoon to discuss radiation therapy options    At this pt he is leaning towards surveillance    Will repeat MRI and Uroanv biopsy in 3 months.   UA today  BMP prior to MRI     BPH with obstruction/lower urinary tract symptoms 41g        I spent 45 min on the day of this encounter preparing for, treating and managing the above

## 2023-12-18 ENCOUNTER — OFFICE VISIT (OUTPATIENT)
Dept: UROLOGY | Facility: CLINIC | Age: 63
End: 2023-12-18
Payer: MEDICARE

## 2023-12-18 ENCOUNTER — OFFICE VISIT (OUTPATIENT)
Dept: RADIATION ONCOLOGY | Facility: CLINIC | Age: 63
End: 2023-12-18
Attending: RADIOLOGY
Payer: MEDICARE

## 2023-12-18 VITALS
DIASTOLIC BLOOD PRESSURE: 94 MMHG | HEART RATE: 55 BPM | OXYGEN SATURATION: 97 % | RESPIRATION RATE: 16 BRPM | BODY MASS INDEX: 27.46 KG/M2 | WEIGHT: 191.81 LBS | HEIGHT: 70 IN | SYSTOLIC BLOOD PRESSURE: 170 MMHG

## 2023-12-18 VITALS
HEIGHT: 70 IN | OXYGEN SATURATION: 97 % | SYSTOLIC BLOOD PRESSURE: 138 MMHG | DIASTOLIC BLOOD PRESSURE: 88 MMHG | BODY MASS INDEX: 27.46 KG/M2 | RESPIRATION RATE: 16 BRPM | HEART RATE: 54 BPM | WEIGHT: 191.81 LBS

## 2023-12-18 DIAGNOSIS — C61 PROSTATE CANCER: Primary | ICD-10-CM

## 2023-12-18 LAB
BILIRUB UR QL STRIP: NEGATIVE
CLARITY UR REFRACT.AUTO: CLEAR
COLOR UR AUTO: YELLOW
GLUCOSE UR QL STRIP: NEGATIVE
HGB UR QL STRIP: NEGATIVE
KETONES UR QL STRIP: NEGATIVE
LEUKOCYTE ESTERASE UR QL STRIP: NEGATIVE
NITRITE UR QL STRIP: NEGATIVE
PH UR STRIP: 7 [PH] (ref 5–8)
PROT UR QL STRIP: NEGATIVE
SP GR UR STRIP: 1.02 (ref 1–1.03)
URN SPEC COLLECT METH UR: NORMAL

## 2023-12-18 PROCEDURE — 3080F DIAST BP >= 90 MM HG: CPT | Mod: HCNC,CPTII,S$GLB, | Performed by: UROLOGY

## 2023-12-18 PROCEDURE — 1159F PR MEDICATION LIST DOCUMENTED IN MEDICAL RECORD: ICD-10-PCS | Mod: HCNC,CPTII,S$GLB, | Performed by: UROLOGY

## 2023-12-18 PROCEDURE — 99215 PR OFFICE/OUTPT VISIT, EST, LEVL V, 40-54 MIN: ICD-10-PCS | Mod: HCNC,S$GLB,, | Performed by: UROLOGY

## 2023-12-18 PROCEDURE — 1101F PR PT FALLS ASSESS DOC 0-1 FALLS W/OUT INJ PAST YR: ICD-10-PCS | Mod: HCNC,CPTII,S$GLB, | Performed by: UROLOGY

## 2023-12-18 PROCEDURE — 3075F SYST BP GE 130 - 139MM HG: CPT | Mod: HCNC,CPTII,S$GLB, | Performed by: RADIOLOGY

## 2023-12-18 PROCEDURE — 99999 PR PBB SHADOW E&M-EST. PATIENT-LVL III: ICD-10-PCS | Mod: PBBFAC,HCNC,, | Performed by: RADIOLOGY

## 2023-12-18 PROCEDURE — 3288F PR FALLS RISK ASSESSMENT DOCUMENTED: ICD-10-PCS | Mod: HCNC,CPTII,S$GLB, | Performed by: RADIOLOGY

## 2023-12-18 PROCEDURE — 3008F BODY MASS INDEX DOCD: CPT | Mod: HCNC,CPTII,S$GLB, | Performed by: RADIOLOGY

## 2023-12-18 PROCEDURE — 1160F RVW MEDS BY RX/DR IN RCRD: CPT | Mod: HCNC,CPTII,S$GLB, | Performed by: RADIOLOGY

## 2023-12-18 PROCEDURE — 99999 PR PBB SHADOW E&M-EST. PATIENT-LVL III: CPT | Mod: PBBFAC,HCNC,, | Performed by: RADIOLOGY

## 2023-12-18 PROCEDURE — 3079F DIAST BP 80-89 MM HG: CPT | Mod: HCNC,CPTII,S$GLB, | Performed by: RADIOLOGY

## 2023-12-18 PROCEDURE — 3077F SYST BP >= 140 MM HG: CPT | Mod: HCNC,CPTII,S$GLB, | Performed by: UROLOGY

## 2023-12-18 PROCEDURE — 1101F PT FALLS ASSESS-DOCD LE1/YR: CPT | Mod: HCNC,CPTII,S$GLB, | Performed by: RADIOLOGY

## 2023-12-18 PROCEDURE — 3079F PR MOST RECENT DIASTOLIC BLOOD PRESSURE 80-89 MM HG: ICD-10-PCS | Mod: HCNC,CPTII,S$GLB, | Performed by: RADIOLOGY

## 2023-12-18 PROCEDURE — 3008F PR BODY MASS INDEX (BMI) DOCUMENTED: ICD-10-PCS | Mod: HCNC,CPTII,S$GLB, | Performed by: UROLOGY

## 2023-12-18 PROCEDURE — 3075F PR MOST RECENT SYSTOLIC BLOOD PRESS GE 130-139MM HG: ICD-10-PCS | Mod: HCNC,CPTII,S$GLB, | Performed by: RADIOLOGY

## 2023-12-18 PROCEDURE — 1160F PR REVIEW ALL MEDS BY PRESCRIBER/CLIN PHARMACIST DOCUMENTED: ICD-10-PCS | Mod: HCNC,CPTII,S$GLB, | Performed by: RADIOLOGY

## 2023-12-18 PROCEDURE — 3288F PR FALLS RISK ASSESSMENT DOCUMENTED: ICD-10-PCS | Mod: HCNC,CPTII,S$GLB, | Performed by: UROLOGY

## 2023-12-18 PROCEDURE — 1159F MED LIST DOCD IN RCRD: CPT | Mod: HCNC,CPTII,S$GLB, | Performed by: UROLOGY

## 2023-12-18 PROCEDURE — 1101F PR PT FALLS ASSESS DOC 0-1 FALLS W/OUT INJ PAST YR: ICD-10-PCS | Mod: HCNC,CPTII,S$GLB, | Performed by: RADIOLOGY

## 2023-12-18 PROCEDURE — 1159F PR MEDICATION LIST DOCUMENTED IN MEDICAL RECORD: ICD-10-PCS | Mod: HCNC,CPTII,S$GLB, | Performed by: RADIOLOGY

## 2023-12-18 PROCEDURE — 1101F PT FALLS ASSESS-DOCD LE1/YR: CPT | Mod: HCNC,CPTII,S$GLB, | Performed by: UROLOGY

## 2023-12-18 PROCEDURE — 1159F MED LIST DOCD IN RCRD: CPT | Mod: HCNC,CPTII,S$GLB, | Performed by: RADIOLOGY

## 2023-12-18 PROCEDURE — 3008F BODY MASS INDEX DOCD: CPT | Mod: HCNC,CPTII,S$GLB, | Performed by: UROLOGY

## 2023-12-18 PROCEDURE — 3080F PR MOST RECENT DIASTOLIC BLOOD PRESSURE >= 90 MM HG: ICD-10-PCS | Mod: HCNC,CPTII,S$GLB, | Performed by: UROLOGY

## 2023-12-18 PROCEDURE — 81003 URINALYSIS AUTO W/O SCOPE: CPT | Mod: HCNC | Performed by: UROLOGY

## 2023-12-18 PROCEDURE — 99204 PR OFFICE/OUTPT VISIT, NEW, LEVL IV, 45-59 MIN: ICD-10-PCS | Mod: HCNC,S$GLB,, | Performed by: RADIOLOGY

## 2023-12-18 PROCEDURE — 99215 OFFICE O/P EST HI 40 MIN: CPT | Mod: HCNC,S$GLB,, | Performed by: UROLOGY

## 2023-12-18 PROCEDURE — 3288F FALL RISK ASSESSMENT DOCD: CPT | Mod: HCNC,CPTII,S$GLB, | Performed by: RADIOLOGY

## 2023-12-18 PROCEDURE — 3077F PR MOST RECENT SYSTOLIC BLOOD PRESSURE >= 140 MM HG: ICD-10-PCS | Mod: HCNC,CPTII,S$GLB, | Performed by: UROLOGY

## 2023-12-18 PROCEDURE — 3288F FALL RISK ASSESSMENT DOCD: CPT | Mod: HCNC,CPTII,S$GLB, | Performed by: UROLOGY

## 2023-12-18 PROCEDURE — 3008F PR BODY MASS INDEX (BMI) DOCUMENTED: ICD-10-PCS | Mod: HCNC,CPTII,S$GLB, | Performed by: RADIOLOGY

## 2023-12-18 PROCEDURE — 99204 OFFICE O/P NEW MOD 45 MIN: CPT | Mod: HCNC,S$GLB,, | Performed by: RADIOLOGY

## 2023-12-18 RX ORDER — CIPROFLOXACIN 500 MG/1
500 TABLET ORAL 2 TIMES DAILY
Qty: 4 TABLET | Refills: 0 | Status: SHIPPED | OUTPATIENT
Start: 2023-12-18 | End: 2023-12-20

## 2023-12-18 NOTE — Clinical Note
UA and reflex cs today In march 2024, BMP, MRI prostate at [facility], then Uronav biopsy at [place] or [place], then see Dr Moncada 2 weeks later to discuss path.  thanks

## 2023-12-19 NOTE — PROGRESS NOTES
Multidisciplinary Uro-Oncology Clinic  [facility] - Radiation Oncology    HISTORY OF PRESENT ILLNESS:   This patient presents for discussion of management options for a recently diagnosed prostate cancer.      Mr. Banerjee was referred to urology for evaluation of an elevated PSA of 7.2 ng/ml in January of 2023.  MRI on 2/22/23 revealed a 41 cc prostate with no focal abnormalities in the peripheral or transitional zone.  The seminal vesicles and neurovascular bundles were unremarkable.  There was no adenopathy.   Repeat PSA in September remained elevated at 7.5 ng/ml.  Biopsies on 10/24/23 revealed Kristin 7 (3+4) adenocarcinoma involving 8% of 2/2 cores from the Lt. apex.  The [place] pattern 4 accounted for 35% of the tumor.  The Lt. mid gland and Lt. base were not able to be biopsied secondary to bleeding.  Cores from the Rt. lobe were negative.  Polaris molecular score indicated active surveillance.  Today the patient states he feels well.   No complaints.       REVIEW OF SYSTEMS:   Review of Systems   Constitutional:  Negative for chills, malaise/fatigue and weight loss.   Gastrointestinal:  Negative for abdominal pain, constipation and diarrhea.   Genitourinary: Negative.      PAST MEDICAL HISTORY:  Past Medical History:   Diagnosis Date    Prostate cancer     Stroke        PAST SURGICAL HISTORY:  Past Surgical History:   Procedure Laterality Date    PROSTATE BIOPSY  10/24/2023    TRANSESOPHAGEAL ECHOCARDIOGRAPHY  02/17/2021    Procedure: Echocardiogram, Transesophageal;  Surgeon: Nhung Humphreys MD;  Location: [facility] CATH LAB;  Service: Cardiology;;    VASECTOMY         ALLERGIES:   Review of patient's allergies indicates:  No Known Allergies    MEDICATIONS:  Current Outpatient Medications   Medication Sig    aspirin (ECOTRIN) 81 MG EC tablet Take 81 mg by mouth once daily.    atorvastatin (LIPITOR) 20 MG tablet Take 1 tablet (20 mg total) by mouth once daily.    ketoconazole  (NIZORAL) 2 % cream AAA bid    tamsulosin (FLOMAX) 0.4 mg Cap Take 1 capsule (0.4 mg total) by mouth once daily.    ciprofloxacin HCl (CIPRO) 500 MG tablet Take 1 tablet (500 mg total) by mouth 2 (two) times daily. for 4 doses     No current facility-administered medications for this visit.       SOCIAL HISTORY:  Social History     Socioeconomic History    Marital status:    Tobacco Use    Smoking status: Never    Smokeless tobacco: Never   Substance and Sexual Activity    Alcohol use: Yes     Alcohol/week: 1.0 standard drink of alcohol     Types: 1 Shots of liquor per week     Comment: moderate    Drug use: Never    Sexual activity: Not Currently     Partners: Female       FAMILY HISTORY:  Family History   Problem Relation Age of Onset    Stroke Mother     Hypertension Mother     Hypertension Father     Heart attack Neg Hx     Heart disease Neg Hx     Heart failure Neg Hx     Hyperlipidemia Neg Hx          PHYSICAL EXAMINATION:  Vitals:    23 1457   BP: 138/88   Pulse: (!) 54   Resp: 16     Physical Exam  Constitutional:       General: He is not in acute distress.     Appearance: Normal appearance.   Neurological:      Mental Status: He is alert and oriented to person, place, and time.   Psychiatric:         Mood and Affect: Mood normal.         Judgment: Judgment normal.       ASSESSMENT/PLAN:  Clinical stage IIB (T1c, N0, M0, GG2, PSA < 10) prostate cancer       ECO    I had a long discussion with the patient and his wife.  Explained he is considered to have favorable intermediate risk prostate cancer.  Discussed the implications of this classification.  We discussed option of active surveillance vs definitive therapy with either surgery or definitive radiotherapy.  Discussed the pros and cons of each approach.  Discussed the procedures, risks and benefits of therapy.  Discussed the acute and long term side effects of treatment.  Discussed the pros and cons of further biopsies to sample the Lt.  mid gland and Lt. base.  The patient is planned for repeat MRI with further biopsies in March, after which he will make a decision regarding further management. Thank you for allowing us to participate in the care of this patient.      Psychosocial Distress screening score of Distress Score: 0 - No Distress noted and reviewed. No intervention indicated.     I spent approximately 50 minutes reviewing the available records and evaluating the patient, out of which over 50% of the time was spent face to face with the patient in counseling and coordinating this patient's care.

## 2024-01-05 ENCOUNTER — CLINICAL SUPPORT (OUTPATIENT)
Dept: DERMATOLOGY | Facility: CLINIC | Age: 64
End: 2024-01-05
Payer: MEDICARE

## 2024-01-05 ENCOUNTER — TELEPHONE (OUTPATIENT)
Dept: DERMATOLOGY | Facility: CLINIC | Age: 64
End: 2024-01-05

## 2024-01-05 ENCOUNTER — PATIENT MESSAGE (OUTPATIENT)
Dept: DERMATOLOGY | Facility: CLINIC | Age: 64
End: 2024-01-05

## 2024-01-05 DIAGNOSIS — L57.0 AK (ACTINIC KERATOSIS): Primary | ICD-10-CM

## 2024-01-05 PROCEDURE — 96567 PDT DSTR PRMLG LES SKN: CPT | Mod: HCNC,S$GLB,, | Performed by: DERMATOLOGY

## 2024-01-05 PROCEDURE — 99999 PR PBB SHADOW E&M-EST. PATIENT-LVL II: CPT | Mod: PBBFAC,HCNC,,

## 2024-01-05 NOTE — TELEPHONE ENCOUNTER
Called and LVM informing pt that nurse Re wanted me to get him scheduled for a f/u with Dr. Barber for PDT treatment/skin check in April. Informed pt to message me back in the portal or give our office a call back to schedule.      ----- Message from Mary Abdi LPN sent at 1/5/2024 10:12 AM CST -----  Patient had PDT today per Dr. Atkins.  Could you call him and schedule a follow up with Dr. Barber for sometime in April.  Thanks so much.

## 2024-01-05 NOTE — PATIENT INSTRUCTIONS
[facility]  [facility] [address]  [address]  [address]  [facility] [address]  [phone]      Jaelyn Elias :1966 MRN:[id number]      2017 Time Session Began: 1  Time Session Ended: 2    Session Type:45 Minute Therapy (05759)    Others Present: no    Intervention: Behavioral, Cognitive, Systemic    Suicide/Homicide/Violence Ideation: No    If Yes, explain:     No current outpatient prescriptions on file.     No current facility-administered medications for this visit.        Change in Medication(s) Reported: No  If Yes, explain:     Patient/Family Education Provided: Yes  Patient/Family Displays Understanding: Yes    If No, explain:     Chief complaint in patient's own words: emotions and past got a hold and felt real crisis point since last session    DARP:   D: Patient and with above description and then proceeding to spend most of the session describing the arrival of her daughter's in-laws, the drinking that went on in the disinterest on their part. She detailed how much she tried to get in her daughter's ear so that she would recognize what was happening and at some point got so frustrated and so thrown back into her past that she got increasingly distraught and what flashed through her mind briefly was a suicide thought, felt so hopeless. From there patient stated that she got some good support from her son and began to wake up to the fact that she's got to release trying to convince her daughter.  A: I listened mostly today as patient had her own process and insights that she wanted to describe  R: Patient truly feels that she has let something go in terms of attachment to convince her daughter, recognizing that she's got a focus on herself. She appreciated my stating that I do believe she has provided a lot of good teaching for her daughter  P: return in two weeks    Need for Community Resources  PHOTODYNAMIC THERAPY POST-OP INSTRUCTIONS      Great Job! You have finished your Ty-Light treatment. The treatment you have completed will destroy precancerous cells in the skin and reduce your risk of developing skin cancer in the future.    What to expect when you leave the office:  You may notice some redness and swelling similar to a sunburn. This is normal and typical with the Ty-Light procedure. In some cases, crusting or blistering of the skin that was treated may occur. This also is normal, and is actually a good sign that the treatment is working to destroy cancerous and precancerous cells. These symptoms typically resolve within a few days, but in some cases more robust reactions may last as long as 1-2 weeks.    Daily Instructions:  Your skin will be very sensitive to sunlight for up to 7 days. You will need to wear sunscreen (SPF30 or higher), as well as clothing that will protect your skin from the sun (wide-brimmed hat, long-sleeved shirts, etc). We recommend that you plan to use sunscreen forever! You must avoid direct sunlight for 48 hours following PDT treatment. Window light should also be avoided during the first 48 hours.    At Night:  If your skin is dry/red/tender/crusting, apply Vaseline/petroleum jelly to the area to moisturize the skin. If needed, you may also soak the treatment area in Epson Salt/[place] bath for a few minutes to help relieve itching and remove crust or scale.    Itching/ Burning Sensations can be treated by:  Keeping the treatment area covered with a layer of Vaseline/Pretoleum jelly/aquaphor.  Extra strength Tylenol and Benadryl  Epson Salt or [place] Soaks    Future Appointments:  Remember while the Ty-Light treatment greatly reduces your risk of skin cancer and precancerous growths, it does not eliminate the risk completely. It remains important you have close follow-up with your dermatologist.     You should still have regular exams to detect skin cancer as early as  Assessed: Yes    Resources Needed: No    If Yes, what resources:     Primary Diagnosis: parenting, ptsd  : N/A    Treatment Plan: Unchanged    Discharge Plan: N/A    Next Appointment: 2 weeks  Kallie Jo PHD     possible and treat precancers not destroyed by the Ty-Light treatment. A follow up appointment should be made 1 to 3 months following your Ty-Light treatment.    Call us with any questions or problems at [phone]              POST PDT       Do not pick at the skin while it is peeling.    Use gentle unscented mild cleanser such as cetaphil.   Ibuprofen can be taken as directed for any pain or discomfort.   Do not let skin dry out on the treated area. Apply a thin layer of Aquaphor or    Vaseline jelly   frequently / as needed.   Keep treated skin away from direct sunlight (outside and windows) for 48    hours after treatment.   Apply SPF everyday   Notify our office if you have any questions or concerns at [phone].

## 2024-01-05 NOTE — PROGRESS NOTES
Photodynamic Therapy Note.     PDT ordered per Wilbert     Patient here today for treatment of actinic keratoses using photodynamic therapy.  Risks including but not limited to burning, stinging, redness, swelling, crusting or blistering of the skin of the area treated were discussed with patient.  Patient elects to proceed with photodynamic therapy.     Treatment area:  Scalp and ears  Treatment area cleaned with rubbing alcohol, 1 Levulan Kerastick (NDC: [id number]) applied evenly to entire surface and allowed to absorb for 120 minutes.  Patient then placed under Ty-U light for 16 minutes 40 seconds.     Patient tolerated treatment well with only mild but tolerable symptoms of discomfort.  Area washed gently with mild soap and water; Zinc oxide sunscreen applied.  Patient advised to avoid any significant light exposure (sun and artificial) for next 48 hours.     RTC:  In 1 month or sooner if any problems arise.

## 2024-03-07 ENCOUNTER — OFFICE VISIT (OUTPATIENT)
Dept: OPHTHALMOLOGY | Facility: CLINIC | Age: 64
End: 2024-03-07
Payer: MEDICARE

## 2024-03-07 DIAGNOSIS — H57.813 BROW PTOSIS, BILATERAL: Primary | ICD-10-CM

## 2024-03-07 DIAGNOSIS — H02.831 DERMATOCHALASIS OF BOTH UPPER EYELIDS: ICD-10-CM

## 2024-03-07 DIAGNOSIS — H02.834 DERMATOCHALASIS OF BOTH UPPER EYELIDS: ICD-10-CM

## 2024-03-07 DIAGNOSIS — L91.8 SKIN TAG: ICD-10-CM

## 2024-03-07 DIAGNOSIS — L57.0 AK (ACTINIC KERATOSIS): ICD-10-CM

## 2024-03-07 PROCEDURE — 1101F PT FALLS ASSESS-DOCD LE1/YR: CPT | Mod: HCNC,CPTII,S$GLB, | Performed by: OPHTHALMOLOGY

## 2024-03-07 PROCEDURE — 1160F RVW MEDS BY RX/DR IN RCRD: CPT | Mod: HCNC,CPTII,S$GLB, | Performed by: OPHTHALMOLOGY

## 2024-03-07 PROCEDURE — 99999 PR PBB SHADOW E&M-EST. PATIENT-LVL III: CPT | Mod: PBBFAC,HCNC,, | Performed by: OPHTHALMOLOGY

## 2024-03-07 PROCEDURE — 99204 OFFICE O/P NEW MOD 45 MIN: CPT | Mod: HCNC,S$GLB,, | Performed by: OPHTHALMOLOGY

## 2024-03-07 PROCEDURE — 1159F MED LIST DOCD IN RCRD: CPT | Mod: HCNC,CPTII,S$GLB, | Performed by: OPHTHALMOLOGY

## 2024-03-07 PROCEDURE — 3288F FALL RISK ASSESSMENT DOCD: CPT | Mod: HCNC,CPTII,S$GLB, | Performed by: OPHTHALMOLOGY

## 2024-03-07 PROCEDURE — 92285 EXTERNAL OCULAR PHOTOGRAPHY: CPT | Mod: HCNC,S$GLB,, | Performed by: OPHTHALMOLOGY

## 2024-03-07 NOTE — PROGRESS NOTES
HPI    Gt Banerjee is a/an 63 y.o. male here for skin tag and ptosis evaluation.     Referred by: Dr. Atkins  Eye Meds: none    Pt sts he was being referred for the skin tags on RASHAAD present for over a   year. Patient mentions bothered by drooping of both upper eyelids.     How long have eyelid(s) been droopy? 3-5 yrs  Any h/o wearing hard CLs? no  Do eyelids feel heavy? no  Does the height of the eyelid(s) fluctuate throughout the day? no  Do eyelid(s) interfere with daily activities such as driving, reading,   working? no  Spontaneous orbital pain? no  Orbital pain w eye movement? no  Red eyes? no  Red eyelids? no  Eyelid swelling? no      Last edited by Bhargavi Huerta MD on 3/7/2024 12:26 PM.            Assessment /Plan     For exam results, see Encounter Report.    Brow ptosis, bilateral    Dermatochalasis of both upper eyelids  -     External Photography - OU - Both Eyes    Skin tag  -     Ambulatory referral/consult to Plastic Surgery    AK (actinic keratosis)  -     Photodynamic Therapy      The patient is a pleasant 63-year-old male here for evaluation of skin tags along with evaluation of the appearance of both eyes in photographs.  The patient has noticed that his eyes are not as visible as they used to be in photographs.  He is interested in options for surgical correction.  The patient does not have any prior history of ocular surgery or eyelid surgery.  He does not have any history of facial Fillers.  He does have a history of refractive error and wears corrective lenses.    On exam, the patient has chronic frontalis use.  He has mild temporal brow ptosis slightly worse on the left than the right.  He has bilateral upper eyelid dermatochalasis with skin lash touch.  He has multiple bilateral upper eyelid skin tags worse on the left than the right.  He has bilateral lower eyelid dermatochalasis with mild herniation of the fat pads along with moderate lateral canthal laxity.    These findings were  discussed with the patient.    We discussed the option of cosmetic bilateral direct temporal brow plasty and cosmetic bilateral upper eyelid blepharoplasty to help open up the palpebral fissure.  Plan for removal of skin tags at the same time as brow plasty and upper eyelid blepharoplasty.  The patient would like to proceed dependent upon the cost of the procedures.      Informed consent obtained after extensive risks/benefits/alternatives were discussed with the patient including but not limited to pain, bleeding, infection, ocular injury, loss of the eye, asymmetry, need for revision in future, scarring.  Alternatives such as waiting were discussed.  All questions were answered.         Hold ASA, NSAIDS, fish oil, Vitamin E and Multivitamins 5 to 7 days prior to procedure.     Return for surgery

## 2024-03-11 ENCOUNTER — HOSPITAL ENCOUNTER (OUTPATIENT)
Dept: RADIOLOGY | Facility: HOSPITAL | Age: 64
Discharge: HOME | End: 2024-03-11
Attending: UROLOGY
Payer: MEDICARE

## 2024-03-11 DIAGNOSIS — C61 PROSTATE CANCER: ICD-10-CM

## 2024-03-11 PROCEDURE — 72197 MRI PELVIS W/O & W/DYE: CPT | Mod: TC,HCNC

## 2024-03-11 PROCEDURE — 72197 MRI PELVIS W/O & W/DYE: CPT | Mod: 26,HCNC,, | Performed by: STUDENT IN AN ORGANIZED HEALTH CARE EDUCATION/TRAINING PROGRAM

## 2024-03-11 PROCEDURE — 25500020 PHARM REV CODE 255: Mod: HCNC | Performed by: UROLOGY

## 2024-03-11 PROCEDURE — A9585 GADOBUTROL INJECTION: HCPCS | Mod: HCNC | Performed by: UROLOGY

## 2024-03-11 RX ORDER — GADOBUTROL 604.72 MG/ML
10 INJECTION INTRAVENOUS
Status: COMPLETED | OUTPATIENT
Start: 2024-03-11 | End: 2024-03-11

## 2024-03-11 RX ADMIN — GADOBUTROL 10 ML: 604.72 INJECTION INTRAVENOUS at 02:03

## 2024-03-20 ENCOUNTER — TELEPHONE (OUTPATIENT)
Dept: UROLOGY | Facility: CLINIC | Age: 64
End: 2024-03-20
Payer: MEDICARE

## 2024-03-20 NOTE — TELEPHONE ENCOUNTER
Patient originally thought he lost the rx for cipro. No additional needs at this time.    ----- Message from Janey Nina sent at 3/20/2024  8:43 AM CDT -----  Name of Who is Calling: JENNY OTT [[id number]]              What is the request in detail: Patient requesting a call back to discuss upcoming procedure              Can the clinic reply by [facility]: No              What Number to Call Back if not in [facility]: [phone]

## 2024-03-21 ENCOUNTER — TELEPHONE (OUTPATIENT)
Dept: UROLOGY | Facility: CLINIC | Age: 64
End: 2024-03-21

## 2024-03-22 ENCOUNTER — PROCEDURE VISIT (OUTPATIENT)
Dept: UROLOGY | Facility: CLINIC | Age: 64
End: 2024-03-22
Payer: MEDICARE

## 2024-03-22 VITALS
HEART RATE: 54 BPM | RESPIRATION RATE: 20 BRPM | BODY MASS INDEX: 27.01 KG/M2 | DIASTOLIC BLOOD PRESSURE: 77 MMHG | SYSTOLIC BLOOD PRESSURE: 135 MMHG | TEMPERATURE: 97 F | WEIGHT: 188.25 LBS

## 2024-03-22 DIAGNOSIS — C61 PROSTATE CANCER: ICD-10-CM

## 2024-03-22 PROCEDURE — 76872 US TRANSRECTAL: CPT | Mod: 26,HCNC,S$GLB, | Performed by: UROLOGY

## 2024-03-22 PROCEDURE — G0416 PROSTATE BIOPSY, ANY MTHD: HCPCS | Mod: 26,HCNC,, | Performed by: STUDENT IN AN ORGANIZED HEALTH CARE EDUCATION/TRAINING PROGRAM

## 2024-03-22 PROCEDURE — 55700 TRANSRECTAL ULTRASOUND W/ BIOPSY: CPT | Mod: HCNC,S$GLB,, | Performed by: UROLOGY

## 2024-03-22 PROCEDURE — 96372 THER/PROPH/DIAG INJ SC/IM: CPT | Mod: HCNC,59,S$GLB, | Performed by: UROLOGY

## 2024-03-22 PROCEDURE — 88305 TISSUE EXAM BY PATHOLOGIST: CPT | Mod: 59,HCNC | Performed by: STUDENT IN AN ORGANIZED HEALTH CARE EDUCATION/TRAINING PROGRAM

## 2024-03-22 RX ORDER — LIDOCAINE HYDROCHLORIDE 10 MG/ML
20 INJECTION INFILTRATION; PERINEURAL
Status: COMPLETED | OUTPATIENT
Start: 2024-03-22 | End: 2024-03-22

## 2024-03-22 RX ORDER — CEFTRIAXONE 1 G/1
1 INJECTION, POWDER, FOR SOLUTION INTRAMUSCULAR; INTRAVENOUS
Status: COMPLETED | OUTPATIENT
Start: 2024-03-22 | End: 2024-03-22

## 2024-03-22 RX ORDER — LIDOCAINE HYDROCHLORIDE 20 MG/ML
JELLY TOPICAL
Status: COMPLETED | OUTPATIENT
Start: 2024-03-22 | End: 2024-03-22

## 2024-03-22 RX ADMIN — CEFTRIAXONE 1 G: 1 INJECTION, POWDER, FOR SOLUTION INTRAMUSCULAR; INTRAVENOUS at 12:03

## 2024-03-22 RX ADMIN — LIDOCAINE HYDROCHLORIDE: 20 JELLY TOPICAL at 12:03

## 2024-03-22 RX ADMIN — LIDOCAINE HYDROCHLORIDE 20 ML: 10 INJECTION INFILTRATION; PERINEURAL at 12:03

## 2024-03-22 NOTE — PATIENT INSTRUCTIONS
What to Expect After a Prostate Biopsy    Please be sure to finish your pre-procedure antibiotics as instructed.    You may have mild bleeding from the rectum or urine for about 1 week to 1 month, or in your ejaculate for several months. This bleeding is normal and expected, and it will stop. You may have mild discomfort in your rectal or urethral area for 24-48 hours.    You cannot do any strenuous lifting, straining, or exercising for 24 hours. You may return to full activity the day after the biopsy.    You may continue to take all your regular medications after the procedure except for the blood thinners.    You may resume all blood-thinning medications once you no longer see any bleeding or whenever your physician prescribing the medication says it is all right to do so. You may take Tylenol if you have a fever and your temperature is less than 100° F or if you have some discomfort.    You will receive a call from the Urology Department at [facility] with the results of your prostate biopsy within one week.    Signs and Symptoms to Report    Call your [facility] urologist at [phone] if you develop any of the following:  Temperature greater than 101°  F  Inability to urinate  A large amount of bleeding from the rectum or in the urine  Persistent or severe pain    After hours or on weekends, you may reach a urology resident on call at this number: [phone].

## 2024-03-22 NOTE — PROCEDURES
"Transrectal Ultrasound w/ Biopsy    Date/Time: 3/22/2024 12:50 PM    Performed by: Jonas Moncada MD  Authorized by: Jonas Moncada MD    Consent Done?:  Yes (Written)  Time out: Immediately prior to procedure a "time out" was called to verify the correct patient, procedure, equipment, support staff and site/side marked as required.    Indications: Prostate Cancer    Preparation: Patient was prepped and draped in usual sterile fashion    Anesthesia:  Lidocaine jelly, Pudendal nerve block and 20cc's 1% Lidocaine  Patient sedated: No    Prostate Size:  45  Lesions:: No    Left Base Biopsies: 2  Left Mid Biopsies: 2  Left [place] Biopsies: 4  Right Base Biopsies: 2  Right Mid Biopsies: 2  Right [place] Biopsies: 2  Transitional zone: Yes    Total Biopsies:  14    Patient tolerance:  Patient tolerated the procedure well with no immediate complications     Rocephin and cipro done  UA neg  Enemas done    No bleeding  2 additional core taken from left apex which is the site of previous mayito 7  MRI neg (limited study)    Prosate ca active surveillance    Virtual visit in 1-2 weeks to discuss path  Standard instructions given        "

## 2024-03-26 ENCOUNTER — PATIENT MESSAGE (OUTPATIENT)
Dept: UROLOGY | Facility: CLINIC | Age: 64
End: 2024-03-26
Payer: MEDICARE

## 2024-03-26 DIAGNOSIS — Z00.00 ENCOUNTER FOR MEDICARE ANNUAL WELLNESS EXAM: ICD-10-CM

## 2024-03-27 LAB
FINAL PATHOLOGIC DIAGNOSIS: NORMAL
GROSS: NORMAL
Lab: NORMAL

## 2024-03-28 ENCOUNTER — PATIENT MESSAGE (OUTPATIENT)
Dept: UROLOGY | Facility: CLINIC | Age: 64
End: 2024-03-28
Payer: MEDICARE

## 2024-03-28 NOTE — PROGRESS NOTES
Dr Moncada called Mr Greenwood with his path results  Please make appt for him to see me in person in 3 weeks to discuss the options

## 2024-04-18 ENCOUNTER — PATIENT MESSAGE (OUTPATIENT)
Dept: OPHTHALMOLOGY | Facility: CLINIC | Age: 64
End: 2024-04-18
Payer: MEDICARE

## 2024-04-22 NOTE — PROGRESS NOTES
Subjective:      Gt Banerjee is a 63 y.o. male who returns today regarding his             The following portions of the patient's history were reviewed and updated as appropriate: allergies, current medications, past family history, past medical history, past social history, past surgical history and problem list.    Review of Systems  Pertinent items are noted in HPI.  A comprehensive multipoint review of systems was negative except as otherwise stated in the HPI.    Past Medical History:   Diagnosis Date    Prostate cancer     Stroke due to PFO  Enodovascular repair*2 with Dr Gloria      Past Surgical History:   Procedure Laterality Date    PROSTATE BIOPSY  10/24/2023    TRANSESOPHAGEAL ECHOCARDIOGRAPHY  02/17/2021    Procedure: Echocardiogram, Transesophageal;  Surgeon: Nhung Humphreys MD;  Location: [facility] CATH LAB;  Service: Cardiology;;    VASECTOMY         Review of patient's allergies indicates:  No Known Allergies       Objective:   Vitals: There were no vitals taken for this visit.    Physical Exam   General: alert and oriented, no acute distress  Respiratory: Symmetric expansion, non-labored breathing  Cardiovascular: no peripheral edema  Abdomen: soft, non distended  Skin: normal coloration and turgor, no rashes, no suspicious skin lesions noted  Neuro: no gross deficits  Psych: normal judgment and insight, normal mood/affect, and non-anxious    Physical Exam    Lab Review   Urinalysis demonstrates no specimen    Lab Results   Component Value Date    WBC 4.23 04/13/2023    HGB 15.4 04/13/2023    HCT 45.6 04/13/2023    MCV 97 04/13/2023     04/13/2023     Lab Results   Component Value Date    CREATININE 0.8 03/11/2024    BUN 22 03/11/2024     Lab Results   Component Value Date    PSADIAG 7.5 (H) 09/28/2023    PSADIAG 6.6 (H) 01/12/2023    PSADIAG 4.0 11/16/2020    PSATOTAL 7.2 (H) 01/18/2023    PSATOTAL 2.8 06/08/2021    PSAFREE 1.12 01/18/2023    PSAFREE 0.45 06/08/2021    PSAFREEPCT 15.56  01/18/2023    PSAFREEPCT 16.07 06/08/2021           Final Pathologic Diagnosis 1. Prostate, left apex, biopsy:      - Prostatic acinar adenocarcinoma      - [place] grade: 3+4, Group 2      - The percentage of [place] grade 4: 25%      - Cribriform pattern 4: present      - Four of four cores are positive for carcinoma      - The percentage of tissue with carcinoma: 5%, 10%, 50% and 50%      - The linear amount of tissue with carcinoma: 0.5 mm, 1 mm, 6 mm and 8 mm      - Perineural invasion seen      2. Prostate, left middle, biopsy:      - Prostatic acinar adenocarcinoma      - Kristin grade: 3+3, Group 1      - Two of two cores positive for carcinoma      - The percentage of tissue with carcinoma: 10% and 15%      - The linear amount of tissue with carcinoma: 1 mm and 2 mm      3. Prostate, left base, biopsy:      - Prostatic acinar adenocarcinoma      - Kristin grade: 3+4, Group 2      - The percentage of [place] grade 4: 40%      - Cribriform pattern 4: present      - Two of two cores are positive for carcinoma      - The percentage of tissue with carcinoma: 20% and 20%      - The linear amount of tissue with carcinoma: 1 mm and 2 mm      - Perineural invasion seen      - Benign seminal vesicle tissue      4. Prostate, right apex, biopsy:      - Benign prostatic tissue      5. Prostate, right middle, biopsy:      - Benign prostatic tissue      6. Prostate, right base, biopsy:      - Benign prostatic tissue   Comment: Interp By Shikha Ozuna M.D., Signed on 03/27/2024 at 09:54     8/14 cores +    Imaging  MRI neg (limimted study)    Assessment and Plan:   Prostate cancer  Kristin 7 3+4 cT1c cN0 cMx; psa 7.5 unfavorable intermediate risk      We discussed his grade and stage of disease, life expectancy and active surveillance vs treatment.  We discussed the roles of surgery, radiation (external and brachytherapy), HIFU and Cryosurgery, hormonal therapy and chemotherapy in the treatment of prostate cancer.  We  discussed brachytherapy and external radiation therapy and open and robotic surgery.  We discussed the risks and benefits of each. We discussed erectile dysfunction, urinary incontinence and bowel issues.  We discussed referral to radiation oncology to further discuss options.  I answered his questions.      PSMA PET then see rad onc  Cardiology appt for preop risk assessment; Dr Donna Braga  Would need preop antibiotics      I spent 45- min on the day of this encounter preparing for, treating and managing the above

## 2024-04-23 ENCOUNTER — OFFICE VISIT (OUTPATIENT)
Dept: UROLOGY | Facility: CLINIC | Age: 64
End: 2024-04-23
Payer: MEDICARE

## 2024-04-23 VITALS
OXYGEN SATURATION: 100 % | SYSTOLIC BLOOD PRESSURE: 122 MMHG | BODY MASS INDEX: 26.82 KG/M2 | WEIGHT: 187.38 LBS | HEIGHT: 70 IN | HEART RATE: 53 BPM | DIASTOLIC BLOOD PRESSURE: 78 MMHG | TEMPERATURE: 97 F | RESPIRATION RATE: 12 BRPM

## 2024-04-23 DIAGNOSIS — C61 PROSTATE CANCER: Primary | ICD-10-CM

## 2024-04-23 PROCEDURE — 1159F MED LIST DOCD IN RCRD: CPT | Mod: CPTII,S$GLB,, | Performed by: UROLOGY

## 2024-04-23 PROCEDURE — 3008F BODY MASS INDEX DOCD: CPT | Mod: CPTII,S$GLB,, | Performed by: UROLOGY

## 2024-04-23 PROCEDURE — 3288F FALL RISK ASSESSMENT DOCD: CPT | Mod: CPTII,S$GLB,, | Performed by: UROLOGY

## 2024-04-23 PROCEDURE — 1126F AMNT PAIN NOTED NONE PRSNT: CPT | Mod: CPTII,S$GLB,, | Performed by: UROLOGY

## 2024-04-23 PROCEDURE — 99215 OFFICE O/P EST HI 40 MIN: CPT | Mod: S$GLB,,, | Performed by: UROLOGY

## 2024-04-23 PROCEDURE — 3074F SYST BP LT 130 MM HG: CPT | Mod: CPTII,S$GLB,, | Performed by: UROLOGY

## 2024-04-23 PROCEDURE — 1101F PT FALLS ASSESS-DOCD LE1/YR: CPT | Mod: CPTII,S$GLB,, | Performed by: UROLOGY

## 2024-04-23 PROCEDURE — 3078F DIAST BP <80 MM HG: CPT | Mod: CPTII,S$GLB,, | Performed by: UROLOGY

## 2024-04-23 NOTE — Clinical Note
Mr. Banerjee is leaning towards surgery for his prostate cancer.  Pat, can you see him for a preop risk assessment please?  Thanks.  Jonas Christianson, please schedule a PSMA PET then see me and Dr Eaton again.  thanks

## 2024-04-26 ENCOUNTER — TELEPHONE (OUTPATIENT)
Dept: UROLOGY | Facility: CLINIC | Age: 64
End: 2024-04-26
Payer: MEDICARE

## 2024-04-26 NOTE — TELEPHONE ENCOUNTER
Message sent for scheduling.  ----- Message from Jonas Moncada MD sent at 4/23/2024  2:00 PM CDT -----  Mr. Banerjee is leaning towards surgery for his prostate cancer.  Pat, can you see him for a preop risk assessment please?  Thanks.  Jonas Christianson, please schedule a PSMA PET then see me and Dr Eaton again.  thanks

## 2024-04-30 ENCOUNTER — HOSPITAL ENCOUNTER (OUTPATIENT)
Dept: RADIOLOGY | Facility: HOSPITAL | Age: 64
Discharge: HOME | End: 2024-04-30
Attending: UROLOGY
Payer: MEDICARE

## 2024-04-30 DIAGNOSIS — C61 PROSTATE CANCER: ICD-10-CM

## 2024-04-30 PROCEDURE — 78815 PET IMAGE W/CT SKULL-THIGH: CPT | Mod: TC

## 2024-04-30 PROCEDURE — A9595 HC PIFLUFOLASTAT F-18, DX, PER 1 MCI: HCPCS | Mod: TB | Performed by: UROLOGY

## 2024-04-30 PROCEDURE — 78815 PET IMAGE W/CT SKULL-THIGH: CPT | Mod: 26,PI,, | Performed by: STUDENT IN AN ORGANIZED HEALTH CARE EDUCATION/TRAINING PROGRAM

## 2024-04-30 RX ADMIN — PIFLUFOLASTAT F-18 9.56 MILLICURIE: 80 INJECTION INTRAVENOUS at 01:04

## 2024-05-01 ENCOUNTER — PATIENT MESSAGE (OUTPATIENT)
Dept: UROLOGY | Facility: CLINIC | Age: 64
End: 2024-05-01
Payer: MEDICARE

## 2024-05-01 DIAGNOSIS — C61 PROSTATE CANCER: Primary | ICD-10-CM

## 2024-05-01 RX ORDER — HEPARIN SODIUM 5000 [USP'U]/ML
5000 INJECTION, SOLUTION INTRAVENOUS; SUBCUTANEOUS
OUTPATIENT
Start: 2024-06-05 | End: 2024-06-05

## 2024-05-01 NOTE — TELEPHONE ENCOUNTER
Dr Moncada called Mr. Banerjee  He has chosen to schedule surgery  He is seeing cardiology 5/20  Will schedule robotic prostatectomy and PLND 6/5  Preop appt with Dr Moncada

## 2024-05-16 PROBLEM — I70.0 AORTIC ATHEROSCLEROSIS: Status: ACTIVE | Noted: 2024-05-16

## 2024-05-16 NOTE — PROGRESS NOTES
General Cardiology Clinic Note  Reason for Visit: Pre-op  Last Clinic Visit: 3/29/2023  General Cardiologist: Dr. Braga    HPI:   Gt Banerjee is a 63 y.o. male who presents for pre-op    Problems:  PFO s/p closure with a 35 mm Amplatzer device 12/2019 followed by 18 mm Cribriform ASD device on 2/17/21   Hx of cryptogenic CVA  CACS 21  Dilated aortic root (4.0 cm)    HPI  Patient presents for pre-op clearance for robotic prostatectomy and PLND. He denies symptoms of CAD, CHF, arrhythmia, hypotension, TIA, claudication. He exercises regularly at the gym. He uses the [employer] and does weights.     ROS:      Review of Systems   Constitutional: Negative for diaphoresis, malaise/fatigue, weight gain and weight loss.   HENT:  Negative for nosebleeds.    Eyes:  Negative for vision loss in left eye, vision loss in right eye and visual disturbance.   Cardiovascular:  Negative for chest pain, claudication, dyspnea on exertion, irregular heartbeat, leg swelling, near-syncope, orthopnea, palpitations, paroxysmal nocturnal dyspnea and syncope.   Respiratory:  Negative for cough, shortness of breath, sleep disturbances due to breathing, snoring and wheezing.    Hematologic/Lymphatic: Negative for bleeding problem. Does not bruise/bleed easily.   Skin:  Negative for poor wound healing and rash.   Musculoskeletal:  Negative for muscle cramps and myalgias.   Gastrointestinal:  Negative for bloating, abdominal pain, diarrhea, heartburn, melena, nausea and vomiting.   Genitourinary:  Negative for hematuria and nocturia.   Neurological:  Negative for brief paralysis, dizziness, headaches, light-headedness, numbness and weakness.   Psychiatric/Behavioral:  Negative for depression.    Allergic/Immunologic: Negative for hives.       PMH:     Past Medical History:   Diagnosis Date    Prostate cancer     Stroke      Past Surgical History:   Procedure Laterality Date    PROSTATE BIOPSY  10/24/2023    TRANSESOPHAGEAL  "ECHOCARDIOGRAPHY  02/17/2021    Procedure: Echocardiogram, Transesophageal;  Surgeon: Nhung Humphreys MD;  Location: [facility] CATH LAB;  Service: Cardiology;;    VASECTOMY       Allergies:   Review of patient's allergies indicates:  No Known Allergies  Medications:     Current Outpatient Medications on File Prior to Visit   Medication Sig Dispense Refill    aspirin (ECOTRIN) 81 MG EC tablet Take 81 mg by mouth once daily.      atorvastatin (LIPITOR) 20 MG tablet Take 1 tablet (20 mg total) by mouth once daily. 90 tablet 3    ketoconazole (NIZORAL) 2 % cream AAA bid 60 g 3    tamsulosin (FLOMAX) 0.4 mg Cap Take 1 capsule (0.4 mg total) by mouth once daily. 180 capsule 0     No current facility-administered medications on file prior to visit.     Social History:     Social History     Tobacco Use    Smoking status: Never    Smokeless tobacco: Never   Substance Use Topics    Alcohol use: Yes     Alcohol/week: 1.0 standard drink of alcohol     Types: 1 Shots of liquor per week     Comment: moderate     Family History:     Family History   Problem Relation Name Age of Onset    Stroke Mother      Hypertension Mother      Hypertension Father      Heart attack Neg Hx      Heart disease Neg Hx      Heart failure Neg Hx      Hyperlipidemia Neg Hx       Physical Exam:   /82   Pulse (!) 54   Ht 5' 10" (1.778 m)   Wt 85.8 kg (189 lb 2.5 oz)   SpO2 97%   BMI 27.14 kg/m²        Physical Exam  Vitals and nursing note reviewed.   Constitutional:       General: He is not in acute distress.     Appearance: Normal appearance.   HENT:      Head: Normocephalic and atraumatic.      Nose: Nose normal.   Eyes:      General: No scleral icterus.     Extraocular Movements: Extraocular movements intact.      Conjunctiva/sclera: Conjunctivae normal.   Neck:      Thyroid: No thyromegaly.      Vascular: No carotid bruit or JVD.   Cardiovascular:      Rate and Rhythm: Normal rate and regular rhythm.      Pulses: Normal pulses.      " "Heart sounds: Normal heart sounds. No murmur heard.     No friction rub. No gallop.   Pulmonary:      Effort: Pulmonary effort is normal.      Breath sounds: Normal breath sounds. No wheezing, rhonchi or rales.   Chest:      Chest wall: No tenderness.   Abdominal:      General: Bowel sounds are normal. There is no distension.      Palpations: Abdomen is soft.      Tenderness: There is no abdominal tenderness.   Musculoskeletal:      Cervical back: Neck supple.      Right lower leg: No edema.      Left lower leg: No edema.   Skin:     General: Skin is warm and dry.      Coloration: Skin is not pale.      Findings: No erythema or rash.      Nails: There is no clubbing.   Neurological:      Mental Status: He is alert and oriented to person, place, and time.   Psychiatric:         Mood and Affect: Mood and affect normal.         Behavior: Behavior normal.          Labs:     Lab Results   Component Value Date     03/11/2024    K 4.0 03/11/2024     03/11/2024    CO2 25 03/11/2024    BUN 22 03/11/2024    CREATININE 0.8 03/11/2024    ANIONGAP 8 03/11/2024     Lab Results   Component Value Date    HGBA1C 4.9 07/31/2019     No results found for: "BNP", "BNPTRIAGEBLO" Lab Results   Component Value Date    WBC 4.23 04/13/2023    HGB 15.4 04/13/2023    HCT 45.6 04/13/2023     04/13/2023    GRAN 2.3 04/13/2023    GRAN 53.2 04/13/2023     Lab Results   Component Value Date    CHOL 171 04/13/2023    HDL 64 04/13/2023    LDLCALC 97.8 04/13/2023    TRIG 46 04/13/2023          Imaging:   Echocardiograms:   TTE 9/27/2021  The left ventricle is normal in size with normal systolic function.  The estimated ejection fraction is 65%.  Indeterminate left ventricular diastolic function.  Mild right ventricular enlargement with normal right ventricular systolic function.  The estimated PA systolic pressure is 19 mmHg.  Normal central venous pressure (3 mmHg).  The ascending aorta is mildly dilated.  Negative bubble study. " There is no evidence of intracardiac shunting.  S/p 35mm Amplatzer PFO closure device (with residual shunt) followed by 18mm Amplatzer occluder device.    REDD 2/17/2021  Normal systolic function. The estimated ejection fraction is 55%  Normal right ventricular size with normal right ventricular systolic function.  Biatrial enlargement.  Initially there was a right to left shunt along the anterior-superior border of the previously placed septal occluder.  Status post successful placement of an occluder cribriform 18 mm with no residual shunt.    REDD 12/4/2019  REDD Bubble study to evalaute for intraatrial shunt.  S/p 35mm Amplatzer PFO occluder. Study is positive for a right to left shunt. Bubbles cross the interatrial septum at the anterior/superior margin of the device.  The left ventricle is normal in size with low normal systolic function. The estimated ejection fraction is 50%.  Normal right ventricular size with normal right ventricular systolic function.  Grade 1 plaque present in the descending aorta.    TTE 11/16/2020  The left ventricle is normal in size with normal systolic function. The estimated ejection fraction is 60%.  There is left ventricular concentric remodeling.  Normal left ventricular diastolic function.  Mild right ventricular enlargement with normal right ventricular systolic function.  The estimated PA systolic pressure is 18 mmHg.  Normal central venous pressure (3 mmHg).  The ascending aorta is mildly dilated at 3.9cm. The aorta is not as well seen as the prior study, which demonstrated a 4.4cm proximal ascending aorta.  S/p 35mm Amplatzer PFO occluder. Study is positive for a right to left shunt (image 113). Bubbles cross the interatrial septum within 3 cardiac cycles. Color flow doppler also positive for shunt.    TTE 6/1/2020  Normal left ventricular systolic function. The estimated ejection fraction is 60%.  Normal LV diastolic function.  No wall motion abnormalities.  Normal right  ventricular systolic function.  The estimated PA systolic pressure is 21 mmHg.  Normal central venous pressure (3 mmHg).  The ascending aorta is mildly dilated at 4.4cm.  Mild left atrial enlargement.  S/p 35 mm Amplatzer PFO occluder. Study is positive for a right to left shunt across the interatrial septum. Shunting occurs in two cardiac cycles.    Stress Tests:   None    Caths:   None    Other:  CT Coronary calcium score 4/13/2023  1.  Agatston calcium score equals 21, which translates to the 25-50th percentile for coronary calcium load based on age and sex.     2.  0.5 cm solid nodule in the right upper lobe.  According to Fleischner Society guidelines, single solid noncalcified lung nodules less than 6 mm do not require routine follow-up, but certainly patients at high risk with suspicious nodule morphology, upper lobe location, or both may warrant 12 month follow-up     3.  Aneurysmal aortic root up to 4.0 cm.    48 Hour Holter Monitor 4/29/2020  Sinus rhythm with frequent PVCs (mostly unifocal, 12.3% burden)     Event monitor 10/2/2019  Sinus rhythm and sinus bradycardia with occasional PVCs and 1 episode of competing accelerated junctional rhythm.     CTA Head and Neck 8/6/2019  CTA head and neck: Unremarkable CTA of the head and neck specifically without evidence for proximal significant stenosis or occlusion as detailed above.     Incidental 5 mm nodule right upper lobe of the lung.  Please see above for further details.     CT head: Mild moderate right paramedian cerebellar encephalomalacia most compatible prior infarction.  No evidence for acute intracranial hemorrhage or definite abnormal parenchymal enhancement.    EKG 5/20/2024: Sinus bradycardia with first degree AV block, cannot rule out inferior infarct.     Assessment:     1. Pre-operative cardiovascular examination    2. Aortic atherosclerosis    3. S/P patent foramen ovale closure    4. PFO (patent foramen ovale)    5. Cryptogenic stroke       Plan:     Pre-op robotic prostatectomy and PLND  Pt has no active cardiac condition (ACS/USA, decompenstated CHF, significant arrhythmias or severe valvular disease) and can easily achieve 4 METS.  As such, pt does not require further cardiac evaluation prior to undergoing surgery.  Aspirin therapy can be held but should be restarted as soon as safely possible.  The remaining cardiac meds can be held as needed but should also be restarted after the procedure.     His estimated risk with the proposed surgery/procedure for an adverse cardiac outcome is 6.0% (95% CI 4.9%-7.4%) (Revised Cardiac Risk Index [RCRI] Score = 1) based on the following risk factors: History of cerebrovascular disease.    PFO s/p closure with a 35 mm Amplatzer device 12/2019 followed by 18 mm Cribriform ASD device on 2/17/21   Hx of cryptogenic CVA  Continue ASA  Recommend IE prophylaxis with 2g of Amoxicillin 30 minutes prior to invasive dental or oral procedures.     CACS 21  Dilated ascending aorta (4 cm)  Asymptomatic. Continue statin      Follow up with Dr. Braga as scheduled.     Signed:  Celeste Langley PA-C  Cardiology   [phone] - General

## 2024-05-17 ENCOUNTER — PATIENT MESSAGE (OUTPATIENT)
Dept: UROLOGY | Facility: CLINIC | Age: 64
End: 2024-05-17
Payer: MEDICARE

## 2024-05-17 DIAGNOSIS — R00.2 PALPITATIONS: Primary | ICD-10-CM

## 2024-05-20 ENCOUNTER — OFFICE VISIT (OUTPATIENT)
Dept: CARDIOLOGY | Facility: CLINIC | Age: 64
End: 2024-05-20
Payer: MEDICARE

## 2024-05-20 VITALS
BODY MASS INDEX: 27.08 KG/M2 | HEART RATE: 54 BPM | DIASTOLIC BLOOD PRESSURE: 82 MMHG | OXYGEN SATURATION: 97 % | SYSTOLIC BLOOD PRESSURE: 137 MMHG | WEIGHT: 189.13 LBS | HEIGHT: 70 IN

## 2024-05-20 DIAGNOSIS — I63.9 CRYPTOGENIC STROKE: ICD-10-CM

## 2024-05-20 DIAGNOSIS — I70.0 AORTIC ATHEROSCLEROSIS: ICD-10-CM

## 2024-05-20 DIAGNOSIS — Z01.810 PRE-OPERATIVE CARDIOVASCULAR EXAMINATION: Primary | ICD-10-CM

## 2024-05-20 DIAGNOSIS — Q21.12 PFO (PATENT FORAMEN OVALE): ICD-10-CM

## 2024-05-20 DIAGNOSIS — Z87.74 S/P PATENT FORAMEN OVALE CLOSURE: ICD-10-CM

## 2024-05-20 LAB
OHS QRS DURATION: 96 MS
OHS QTC CALCULATION: 371 MS

## 2024-05-20 PROCEDURE — 3008F BODY MASS INDEX DOCD: CPT | Mod: CPTII,S$GLB,, | Performed by: PHYSICIAN ASSISTANT

## 2024-05-20 PROCEDURE — 99214 OFFICE O/P EST MOD 30 MIN: CPT | Mod: 25,S$GLB,, | Performed by: PHYSICIAN ASSISTANT

## 2024-05-20 PROCEDURE — 3079F DIAST BP 80-89 MM HG: CPT | Mod: CPTII,S$GLB,, | Performed by: PHYSICIAN ASSISTANT

## 2024-05-20 PROCEDURE — 93000 ELECTROCARDIOGRAM COMPLETE: CPT | Mod: S$GLB,,, | Performed by: INTERNAL MEDICINE

## 2024-05-20 PROCEDURE — 3288F FALL RISK ASSESSMENT DOCD: CPT | Mod: CPTII,S$GLB,, | Performed by: PHYSICIAN ASSISTANT

## 2024-05-20 PROCEDURE — 1160F RVW MEDS BY RX/DR IN RCRD: CPT | Mod: CPTII,S$GLB,, | Performed by: PHYSICIAN ASSISTANT

## 2024-05-20 PROCEDURE — 3075F SYST BP GE 130 - 139MM HG: CPT | Mod: CPTII,S$GLB,, | Performed by: PHYSICIAN ASSISTANT

## 2024-05-20 PROCEDURE — 1159F MED LIST DOCD IN RCRD: CPT | Mod: CPTII,S$GLB,, | Performed by: PHYSICIAN ASSISTANT

## 2024-05-20 PROCEDURE — 1101F PT FALLS ASSESS-DOCD LE1/YR: CPT | Mod: CPTII,S$GLB,, | Performed by: PHYSICIAN ASSISTANT

## 2024-05-20 PROCEDURE — 1126F AMNT PAIN NOTED NONE PRSNT: CPT | Mod: CPTII,S$GLB,, | Performed by: PHYSICIAN ASSISTANT

## 2024-05-20 PROCEDURE — 99999 PR PBB SHADOW E&M-EST. PATIENT-LVL IV: CPT | Mod: PBBFAC,,, | Performed by: PHYSICIAN ASSISTANT

## 2024-05-20 RX ORDER — TAMSULOSIN HYDROCHLORIDE 0.4 MG/1
1 CAPSULE ORAL
Qty: 90 CAPSULE | Refills: 3 | Status: SHIPPED | OUTPATIENT
Start: 2024-05-20

## 2024-05-21 NOTE — PROGRESS NOTES
"Subjective:      Gt Banerjee is a 63 y.o. male who returns today regarding his     Preop visit  No previous ab surgeries    See Cardiology recs below"    Pre-op robotic prostatectomy and PLND  Pt has no active cardiac condition (ACS/USA, decompenstated CHF, significant arrhythmias or severe valvular disease) and can easily achieve 4 METS.  As such, pt does not require further cardiac evaluation prior to undergoing surgery.  Aspirin therapy can be held but should be restarted as soon as safely possible.  The remaining cardiac meds can be held as needed but should also be restarted after the procedure.      His estimated risk with the proposed surgery/procedure for an adverse cardiac outcome is 6.0% (95% CI 4.9%-7.4%) (Revised Cardiac Risk Index [RCRI] Score = 1) based on the following risk factors: History of cerebrovascular disease.     PFO s/p closure with a 35 mm Amplatzer device 12/2019 followed by 18 mm Cribriform ASD device on 2/17/21   Hx of cryptogenic CVA  Continue ASA  Recommend IE prophylaxis with 2g of Amoxicillin 30 minutes prior to invasive dental or oral procedures.      CACS 21  Dilated ascending aorta (4 cm)  Asymptomatic. Continue statin        Follow up with Dr. Braga as scheduled.      Signed:  Celeste Langley PA-C  Cardiology   [phone] - General   .    The following portions of the patient's history were reviewed and updated as appropriate: allergies, current medications, past family history, past medical history, past social history, past surgical history and problem list.    Review of Systems  Pertinent items are noted in HPI.  A comprehensive multipoint review of systems was negative except as otherwise stated in the HPI.    Past Medical History:   Diagnosis Date    Prostate cancer     Stroke      Past Surgical History:   Procedure Laterality Date    PROSTATE BIOPSY  10/24/2023    TRANSESOPHAGEAL ECHOCARDIOGRAPHY  02/17/2021    Procedure: Echocardiogram, Transesophageal;  Surgeon: " Nhung Humphreys MD;  Location: [facility] CATH LAB;  Service: Cardiology;;    VASECTOMY         Review of patient's allergies indicates:  No Known Allergies       Objective:   Vitals: There were no vitals taken for this visit.    Physical Exam   General: alert and oriented, no acute distress  Respiratory: Symmetric expansion, non-labored breathing  Cardiovascular: no peripheral edema  Abdomen: soft, non distended  Skin: normal coloration and turgor, no rashes, no suspicious skin lesions noted  Neuro: no gross deficits  Psych: normal judgment and insight, normal mood/affect, and non-anxious    Physical Exam    Lab Review   Urinalysis demonstrates no specimen    Lab Results   Component Value Date    WBC 4.23 04/13/2023    HGB 15.4 04/13/2023    HCT 45.6 04/13/2023    MCV 97 04/13/2023     04/13/2023     Lab Results   Component Value Date    CREATININE 0.8 03/11/2024    BUN 22 03/11/2024     Lab Results   Component Value Date     PSADIAG 7.5 (H) 09/28/2023     PSADIAG 6.6 (H) 01/12/2023     PSADIAG 4.0 11/16/2020     PSATOTAL 7.2 (H) 01/18/2023     PSATOTAL 2.8 06/08/2021     PSAFREE 1.12 01/18/2023     PSAFREE 0.45 06/08/2021     PSAFREEPCT 15.56 01/18/2023     PSAFREEPCT 16.07 06/08/2021               Final Pathologic Diagnosis 1. Prostate, left apex, biopsy:      - Prostatic acinar adenocarcinoma      - Crane grade: 3+4, Group 2      - The percentage of Crane grade 4: 25%      - Cribriform pattern 4: present      - Four of four cores are positive for carcinoma      - The percentage of tissue with carcinoma: 5%, 10%, 50% and 50%      - The linear amount of tissue with carcinoma: 0.5 mm, 1 mm, 6 mm and 8 mm      - Perineural invasion seen      2. Prostate, left middle, biopsy:      - Prostatic acinar adenocarcinoma      - Crane grade: 3+3, Group 1      - Two of two cores positive for carcinoma      - The percentage of tissue with carcinoma: 10% and 15%      - The linear amount of tissue with carcinoma: 1  mm and 2 mm      3. Prostate, left base, biopsy:      - Prostatic acinar adenocarcinoma      - Amsterdam grade: 3+4, Group 2      - The percentage of Kristin grade 4: 40%      - Cribriform pattern 4: present      - Two of two cores are positive for carcinoma      - The percentage of tissue with carcinoma: 20% and 20%      - The linear amount of tissue with carcinoma: 1 mm and 2 mm      - Perineural invasion seen      - Benign seminal vesicle tissue      4. Prostate, right apex, biopsy:      - Benign prostatic tissue      5. Prostate, right middle, biopsy:      - Benign prostatic tissue      6. Prostate, right base, biopsy:      - Benign prostatic tissue   Comment: Interp By Shikha Ozuna M.D., Signed on 03/27/2024 at 09:54      8/14 cores +     Imaging  MRI neg (limimted study)         Imaging  PSMA PET no nets        Assessment and Plan:   Prostate cancer      Kristin 7 3+4 cT1c cN0 cMx; psa 7.5 unfavorable intermediate risk        We discussed his grade and stage of disease, life expectancy and active surveillance vs treatment.  We discussed the roles of surgery, radiation (external and brachytherapy), HIFU and Cryosurgery, hormonal therapy and chemotherapy in the treatment of prostate cancer.  We discussed brachytherapy and external radiation therapy and open and robotic surgery.  We discussed the risks and benefits of each. We discussed erectile dysfunction, urinary incontinence and bowel issues.  We discussed referral to radiation oncology to further discuss options.  I answered his questions.           HE IS VERY CONCERNED ABOUT HIS CARDIAC RISK; WE WILL POSTPONE SURGERY AND HAVE HIM SEE CARDIOLOGY AGAIN BEFORE SURGERY; HE WILL ALSO DISCUSS THIS WITH ANESTHESIA TODAY    HE PREVIOUSLY DECLINED RAD ONC APPT;  I INSIST THAT HE SEE RAD ONC FOR A CONSULT BEFORE WE RESCHEDULE SURGERY.        I spent 45- min on the day of this encounter preparing for, treating and managing the above

## 2024-05-23 ENCOUNTER — ANESTHESIA EVENT (OUTPATIENT)
Dept: SURGERY | Facility: OTHER | Age: 64
End: 2024-05-23
Payer: MEDICARE

## 2024-05-23 NOTE — ANESTHESIA PREPROCEDURE EVALUATION
05/23/2024  Gt Banerjee is a 63 y.o., male.      Pre-op Assessment    I have reviewed the Patient Summary Reports.     I have reviewed the Nursing Notes. I have reviewed the NPO Status.   I have reviewed the Medications.     Review of Systems  Anesthesia Hx:  No problems with previous Anesthesia   History of prior surgery of interest to airway management or planning:  Previous anesthesia: General 2021 PFO closure with general anesthesia.        Denies Family Hx of Anesthesia complications.    Denies Personal Hx of Anesthesia complications.                    Social:  Non-Smoker, Social Alcohol Use       Hematology/Oncology:                      Current/Recent Cancer.  Other (see Oncology comments)          Oncology Comments: Prostate CA     Cardiovascular:  Exercise tolerance: good   Hypertension    Denies CAD.          PVD     Hx PFO s/p closure 2021  Dilated aortic root    Normal EF                         Pulmonary:  Pulmonary Normal        Incidental Lung Nodule               Renal/:   Denies Chronic Renal Disease.  BPH              Hepatic/GI:  Hepatic/GI Normal                 Musculoskeletal:  Musculoskeletal Normal                Neurological:   CVA, no residual symptoms                                    Endocrine:  Endocrine Normal            Psych:  Psychiatric Normal                  Physical Exam  General: Well nourished and Cooperative    Airway:  Mallampati: II   Mouth Opening: Normal  TM Distance: Normal  Neck ROM: Normal ROM    Dental:  Intact      Anesthesia Plan  Type of Anesthesia, risks & benefits discussed:    Anesthesia Type: Gen ETT  Intra-op Monitoring Plan: Standard ASA Monitors  Post Op Pain Control Plan: multimodal analgesia and peripheral nerve block  Induction:  IV  Airway Plan: Video  Informed Consent: Informed consent signed with the Patient and all parties understand the  risks and agree with anesthesia plan.  All questions answered.   ASA Score: 3  Day of Surgery Review of History & Physical: H&P Update referred to the surgeon/provider.  Anesthesia Plan Notes:   Will Discuss TAP block  Recent Labs  Needs T&S    Remove bubbles form IVF out of caution (no shunting on last echo)    Cardiac clearance in epic - however patient wishes to explore obtaining a repeat echo preop    Spoke with pharmacy and IV amoxicillin not available however recommended either ancef or ampicillin for endocarditis prophylaxis as both have coverage; will proceed with ancef    Ready For Surgery From Anesthesia Perspective.     .

## 2024-05-24 ENCOUNTER — HOSPITAL ENCOUNTER (OUTPATIENT)
Dept: PREADMISSION TESTING | Facility: OTHER | Age: 64
Discharge: HOME | End: 2024-05-24
Attending: UROLOGY
Payer: MEDICARE

## 2024-05-24 ENCOUNTER — OFFICE VISIT (OUTPATIENT)
Dept: UROLOGY | Facility: CLINIC | Age: 64
End: 2024-05-24
Payer: MEDICARE

## 2024-05-24 VITALS
SYSTOLIC BLOOD PRESSURE: 117 MMHG | TEMPERATURE: 98 F | WEIGHT: 189 LBS | HEIGHT: 70 IN | BODY MASS INDEX: 27.06 KG/M2 | DIASTOLIC BLOOD PRESSURE: 72 MMHG | HEART RATE: 56 BPM | OXYGEN SATURATION: 98 %

## 2024-05-24 VITALS — SYSTOLIC BLOOD PRESSURE: 138 MMHG | DIASTOLIC BLOOD PRESSURE: 74 MMHG | HEART RATE: 57 BPM

## 2024-05-24 DIAGNOSIS — C61 PROSTATE CANCER: Primary | ICD-10-CM

## 2024-05-24 PROCEDURE — 3078F DIAST BP <80 MM HG: CPT | Mod: CPTII,S$GLB,, | Performed by: UROLOGY

## 2024-05-24 PROCEDURE — 99215 OFFICE O/P EST HI 40 MIN: CPT | Mod: S$GLB,,, | Performed by: UROLOGY

## 2024-05-24 PROCEDURE — 1101F PT FALLS ASSESS-DOCD LE1/YR: CPT | Mod: CPTII,S$GLB,, | Performed by: UROLOGY

## 2024-05-24 PROCEDURE — 1159F MED LIST DOCD IN RCRD: CPT | Mod: CPTII,S$GLB,, | Performed by: UROLOGY

## 2024-05-24 PROCEDURE — 3288F FALL RISK ASSESSMENT DOCD: CPT | Mod: CPTII,S$GLB,, | Performed by: UROLOGY

## 2024-05-24 PROCEDURE — 3075F SYST BP GE 130 - 139MM HG: CPT | Mod: CPTII,S$GLB,, | Performed by: UROLOGY

## 2024-05-24 RX ORDER — MULTIVITAMIN
1 TABLET ORAL DAILY
COMMUNITY

## 2024-05-24 NOTE — DISCHARGE INSTRUCTIONS
Information to Prepare you for your Surgery    PRE-ADMIT TESTING -  [phone]    [address]  [facility]          Your surgery has been scheduled at [facility]. We are pleased to have the opportunity to serve you. For Further Information please call [phone].    On the day of surgery please report to the Information Desk on the 1st floor.    CONTACT YOUR PHYSICIAN'S OFFICE THE DAY PRIOR TO YOUR SURGERY TO OBTAIN YOUR ARRIVAL TIME.     The evening before surgery do not eat anything after 9 p.m. ( this includes hard candy, chewing gum and mints).  You may only have GATORADE, POWERADE AND WATER  from 9 p.m. until you leave your home.   DO NOT DRINK ANY LIQUIDS ON THE WAY TO THE HOSPITAL.      Why does your anesthesiologist allow you to drink Gatorade/Powerade before surgery?  Gatorade/Powerade helps to increase your comfort before surgery and to decrease your nausea after surgery. The carbohydrates in Gatorade/Powerade help reduce your body's stress response to surgery.  If you are a diabetic-drink only water prior to surgery.    Outpatient Surgery- May allow 2 adult (18 and older) Support Persons (1 being the designated ) for all surgical/procedural patients. A breastfeeding mother will be allowed her infant and 2 adult Support Persons. No one under the age of 18 will be allowed in the building. No swapping out of visitors in the [facility].      SPECIAL MEDICATION INSTRUCTIONS: TAKE medications checked off by the Anesthesiologist on your Medication List.    Angiogram Patients: Take medications as instructed by your physician, including aspirin.     Surgery Patients:    If you take ASPIRIN - Your PHYSICIAN/SURGEON will need to inform you IF/OR when you need to stop taking aspirin prior to your surgery.     The week prior to surgery do not ot take any medications containing IBUPROFEN or NSAIDS ( Advil, Motrin, Goodys, BC, Aleve, Naproxen etc)  If you are not sure if you should take a medicine please call your surgeon's office.  Ok to take Tylenol    Do Not Wear any make-up (especially eye make-up) to surgery. Please remove any false eyelashes or eyelash extensions. If you arrive the day of surgery with makeup/eyelashes on you will be required to remove prior to surgery. (There is a risk of corneal abrasions if eye makeup/eyelash extensions are not removed)      Leave all valuables at home.   Do Not wear any jewelry or watches, including any metal in body piercings. Jewelry must be removed prior to coming to the hospital.  There is a possibility that rings that are unable to be removed may be cut off if they are on the surgical extremity.    Please remove all hair extensions, wigs, clips and any other metal accessories/ ornaments from your hair.  These items may pose a flammable/fire risk in Surgery and must be removed.    Do not shave your surgical area at least 5 days prior to your surgery. The surgical prep will be performed at the hospital according to Infection Control regulations.    Contact Lens must be removed before surgery. Either do not wear the contact lens or bring a case and solution for storage.  Please bring a container for eyeglasses or dentures as required.  Bring any paperwork your physician has provided, such as consent forms,  history and physicals, doctor's orders, etc.   Bring comfortable clothes that are loose fitting to wear upon discharge. Take into consideration the type of surgery being performed.  Maintain your diet as advised per your physician the day prior to surgery.      Adequate rest the night before surgery is advised.   Park in the Parking lot behind the hospital or in the [facility] across the street from the parking lot. Parking is complimentary.  If you will be discharged the same day as your procedure, please arrange for a responsible adult to drive you home or to accompany you if traveling by taxi.    YOU WILL NOT BE PERMITTED TO DRIVE OR TO LEAVE THE HOSPITAL ALONE AFTER SURGERY.   If you are being discharged the same day, it is strongly recommended that you arrange for someone to remain with you for the first 24 hrs following your surgery.    The Surgeon will speak to your family/visitor after your surgery regarding the outcome of your surgery and post op care.  The Surgeon may speak to you after your surgery, but there is a possibility you may not remember the details.  Please check with your family members regarding the conversation with the Surgeon.    We strongly recommend whoever is bringing you home be present for discharge instructions.  This will ensure a thorough understanding for your post op home care.          Thank you for your cooperation.  The Staff of [facility].            Bathing Instructions with Hibiclens    Shower the evening before and morning of your procedure with Chlorhexidine (Hibiclens)  do not use Chlorhexidine on your face or genitals. Do not get in your eyes.  Wash your face with water and your regular face wash/soap  Use your regular shampoo  Apply Chlorhexidine (Hibiclens) directly on your skin or on a wet washcloth and wash gently. When showering: Move away from the shower stream when applying Chlorhexidine (Hibiclens) to avoid rinsing off too soon.  Rinse thoroughly with warm water  Do not dilute Chlorhexidine (Hibiclens)   Dry off as usual, do not use any deodorant, powder, body lotions, perfume, after shave or cologne.

## 2024-05-24 NOTE — Clinical Note
Meghan, please postpone his surgery.  Have him see [facility] and rad onc then RTC to see me.  thanks

## 2024-05-27 ENCOUNTER — TELEPHONE (OUTPATIENT)
Dept: CARDIOLOGY | Facility: CLINIC | Age: 64
End: 2024-05-27
Payer: MEDICARE

## 2024-05-27 ENCOUNTER — TELEPHONE (OUTPATIENT)
Dept: UROLOGY | Facility: CLINIC | Age: 64
End: 2024-05-27
Payer: MEDICARE

## 2024-05-27 NOTE — TELEPHONE ENCOUNTER
- Message sent to Lidya to place case in dep[place]  - RadOnc appt 6/3  - Cards appt 7/9  - RTC 7/12    ----- Message from Jonas Moncada MD sent at 5/24/2024 10:00 AM CDT -----  Meghan, please postpone his surgery.  Have him see [facility] and rad onc then RTC to see me.  thanks

## 2024-05-27 NOTE — TELEPHONE ENCOUNTER
Spoke to wife- appt scheduled for 6/10/24----- Message from Dawit Claudio MD sent at 5/24/2024  4:19 PM CDT -----  Maybe he can get his concerns addressed by Dr. Walter Braga.    If he really wants to I would be happy to see him.    Dawit Claudio M.D.  ----- Message -----  From: Jonas Moncada MD  Sent: 5/24/2024  10:00 AM CDT  To: Anatoly Eaton Jr., MD; Dawit Claudio MD; #    Meghan, please postpone his surgery.  Have him see [facility] and rad onc then RTC to see me.  thanks

## 2024-06-03 ENCOUNTER — OFFICE VISIT (OUTPATIENT)
Dept: RADIATION ONCOLOGY | Facility: CLINIC | Age: 64
End: 2024-06-03
Attending: RADIOLOGY
Payer: MEDICARE

## 2024-06-03 VITALS
WEIGHT: 184.81 LBS | HEIGHT: 70 IN | DIASTOLIC BLOOD PRESSURE: 80 MMHG | BODY MASS INDEX: 26.46 KG/M2 | HEART RATE: 53 BPM | SYSTOLIC BLOOD PRESSURE: 131 MMHG

## 2024-06-03 DIAGNOSIS — C61 PROSTATE CANCER: Primary | ICD-10-CM

## 2024-06-03 PROCEDURE — 3008F BODY MASS INDEX DOCD: CPT | Mod: HCNC,CPTII,S$GLB, | Performed by: RADIOLOGY

## 2024-06-03 PROCEDURE — 3079F DIAST BP 80-89 MM HG: CPT | Mod: HCNC,CPTII,S$GLB, | Performed by: RADIOLOGY

## 2024-06-03 PROCEDURE — 99213 OFFICE O/P EST LOW 20 MIN: CPT | Mod: HCNC,S$GLB,, | Performed by: RADIOLOGY

## 2024-06-03 PROCEDURE — 1160F RVW MEDS BY RX/DR IN RCRD: CPT | Mod: HCNC,CPTII,S$GLB, | Performed by: RADIOLOGY

## 2024-06-03 PROCEDURE — 3288F FALL RISK ASSESSMENT DOCD: CPT | Mod: HCNC,CPTII,S$GLB, | Performed by: RADIOLOGY

## 2024-06-03 PROCEDURE — 1101F PT FALLS ASSESS-DOCD LE1/YR: CPT | Mod: HCNC,CPTII,S$GLB, | Performed by: RADIOLOGY

## 2024-06-03 PROCEDURE — 1159F MED LIST DOCD IN RCRD: CPT | Mod: HCNC,CPTII,S$GLB, | Performed by: RADIOLOGY

## 2024-06-03 PROCEDURE — 99999 PR PBB SHADOW E&M-EST. PATIENT-LVL III: CPT | Mod: PBBFAC,HCNC,, | Performed by: RADIOLOGY

## 2024-06-03 PROCEDURE — 3075F SYST BP GE 130 - 139MM HG: CPT | Mod: HCNC,CPTII,S$GLB, | Performed by: RADIOLOGY

## 2024-06-03 PROCEDURE — 1126F AMNT PAIN NOTED NONE PRSNT: CPT | Mod: HCNC,CPTII,S$GLB, | Performed by: RADIOLOGY

## 2024-06-04 ENCOUNTER — PATIENT MESSAGE (OUTPATIENT)
Dept: UROLOGY | Facility: CLINIC | Age: 64
End: 2024-06-04
Payer: MEDICARE

## 2024-06-05 NOTE — PROGRESS NOTES
[facility] - Radiation Oncology     Patient ID: Gt Banerjee is a 63 y.o. male.    Chief Complaint: Prostate Cancer (Discuss xrt)      Mr. Banerjee has a history of Clinical stage IIB (T1c, N0, M0, GG2, PSA < 10) prostate cancer.  He initially presented for evaluation of an elevated PSA of 7.2 ng/ml in January of 2023.  MRI on 2/22/23 revealed a 41 cc prostate with no focal abnormalities in the peripheral or transitional zone.  The seminal vesicles and neurovascular bundles were unremarkable.  There was no adenopathy.   Repeat PSA in September remained elevated at 7.5 ng/ml.  Biopsies on 10/24/23 revealed Kristin 7 (3+4) adenocarcinoma involving 8% of 2/2 cores from the Lt. apex.  The [place] pattern 4 accounted for 35% of the tumor.  The Lt. mid gland and Lt. base were not able to be biopsied secondary to bleeding.  Cores from the Rt. lobe were negative.  Polaris molecular score indicated active surveillance. The patient underwent repeat biopsies on 3/22/24.  There was Kristin 7 (3+4) adenocarcinoma involving 4/4 cores from the Lt. apex, 2/2 cores from the Lt. base.  The [place] pattern 4 accounted for 25 - 40% of the tumor.  There was Kristin 6 (3+3) adenocarcinoma involving 2/2 cores from the Lt. base.  The patient presents for further discussion.        Review of Systems   Constitutional:  Negative for activity change, appetite change, chills and fatigue.   Gastrointestinal:  Negative for constipation, diarrhea and fecal incontinence.   Genitourinary:  Negative for bladder incontinence, difficulty urinating, dysuria, frequency and hematuria.       Physical Exam  Constitutional:       General: He is not in acute distress.     Appearance: Normal appearance.   Neurological:      Mental Status: He is alert and oriented to person, place, and time.   Psychiatric:         Mood and Affect: Mood normal.         Judgment: Judgment normal.       Clinical stage IIB prostate cancer   I had a long  discussion with the patient and his wife.  He is considering RALP but he has a cardiac issue and requires further testing.  We discussed definitive radiotherapy.  Explained given the additional information, we would still consider definitive radiotherapy either with 70 Gy to the prostate and SV or possible brachytherapy +/- external beam to the prostate and SV.  Discussed the procedures, risks and benefits  of therapy.  He is planned for further cardiac evaluation next week and will make a final decision at that time.

## 2024-06-10 ENCOUNTER — OFFICE VISIT (OUTPATIENT)
Dept: CARDIOLOGY | Facility: CLINIC | Age: 64
End: 2024-06-10
Attending: INTERNAL MEDICINE
Payer: MEDICARE

## 2024-06-10 ENCOUNTER — PATIENT MESSAGE (OUTPATIENT)
Dept: CARDIOLOGY | Facility: CLINIC | Age: 64
End: 2024-06-10

## 2024-06-10 ENCOUNTER — PATIENT MESSAGE (OUTPATIENT)
Dept: UROLOGY | Facility: CLINIC | Age: 64
End: 2024-06-10
Payer: MEDICARE

## 2024-06-10 VITALS
OXYGEN SATURATION: 96 % | HEART RATE: 55 BPM | SYSTOLIC BLOOD PRESSURE: 128 MMHG | HEIGHT: 70 IN | DIASTOLIC BLOOD PRESSURE: 78 MMHG | WEIGHT: 185 LBS | BODY MASS INDEX: 26.48 KG/M2

## 2024-06-10 DIAGNOSIS — C61 PROSTATE CANCER: ICD-10-CM

## 2024-06-10 DIAGNOSIS — E66.3 OVERWEIGHT: ICD-10-CM

## 2024-06-10 DIAGNOSIS — I10 PRIMARY HYPERTENSION: ICD-10-CM

## 2024-06-10 DIAGNOSIS — Z87.74 S/P PATENT FORAMEN OVALE CLOSURE: ICD-10-CM

## 2024-06-10 DIAGNOSIS — Z86.73 HISTORY OF ISCHEMIC VERTEBROBASILAR ARTERY BRAINSTEM STROKE: ICD-10-CM

## 2024-06-10 DIAGNOSIS — I44.0 ATRIOVENTRICULAR BLOCK, FIRST DEGREE: ICD-10-CM

## 2024-06-10 DIAGNOSIS — I70.0 ATHEROSCLEROSIS OF AORTA: ICD-10-CM

## 2024-06-10 PROCEDURE — 99999 PR PBB SHADOW E&M-EST. PATIENT-LVL III: CPT | Mod: PBBFAC,HCNC,, | Performed by: INTERNAL MEDICINE

## 2024-06-10 PROCEDURE — 99214 OFFICE O/P EST MOD 30 MIN: CPT | Mod: HCNC,S$GLB,, | Performed by: INTERNAL MEDICINE

## 2024-06-10 PROCEDURE — 1126F AMNT PAIN NOTED NONE PRSNT: CPT | Mod: HCNC,CPTII,S$GLB, | Performed by: INTERNAL MEDICINE

## 2024-06-10 PROCEDURE — 3008F BODY MASS INDEX DOCD: CPT | Mod: HCNC,CPTII,S$GLB, | Performed by: INTERNAL MEDICINE

## 2024-06-10 PROCEDURE — 3074F SYST BP LT 130 MM HG: CPT | Mod: HCNC,CPTII,S$GLB, | Performed by: INTERNAL MEDICINE

## 2024-06-10 PROCEDURE — 1160F RVW MEDS BY RX/DR IN RCRD: CPT | Mod: HCNC,CPTII,S$GLB, | Performed by: INTERNAL MEDICINE

## 2024-06-10 PROCEDURE — 1159F MED LIST DOCD IN RCRD: CPT | Mod: HCNC,CPTII,S$GLB, | Performed by: INTERNAL MEDICINE

## 2024-06-10 PROCEDURE — 3078F DIAST BP <80 MM HG: CPT | Mod: HCNC,CPTII,S$GLB, | Performed by: INTERNAL MEDICINE

## 2024-06-10 RX ORDER — ATORVASTATIN CALCIUM 20 MG/1
20 TABLET, FILM COATED ORAL DAILY
Qty: 90 TABLET | Refills: 3 | Status: SHIPPED | OUTPATIENT
Start: 2024-06-10 | End: 2024-06-10 | Stop reason: SDUPTHER

## 2024-06-10 RX ORDER — ASPIRIN 81 MG/1
81 TABLET ORAL DAILY
COMMUNITY
Start: 2024-06-10

## 2024-06-10 RX ORDER — ASPIRIN 81 MG/1
81 TABLET ORAL DAILY
COMMUNITY
Start: 2024-06-10 | End: 2024-06-10 | Stop reason: SDUPTHER

## 2024-06-10 RX ORDER — ATORVASTATIN CALCIUM 20 MG/1
20 TABLET, FILM COATED ORAL DAILY
Qty: 90 TABLET | Refills: 3 | Status: SHIPPED | OUTPATIENT
Start: 2024-06-10

## 2024-06-10 NOTE — PROGRESS NOTES
Subjective     Gt Banerjee is a 63 y.o. male with hypertension. In 2019 he had a cryptogenic CVA. He was diagnosed with a PFO. He had the PFO closed in 2019 and then an additional procedure in 2021. An echocardiogram in 2021 did not reveal any shunting, He has a first degree atrioventricular block. He has been diagnosed with prostate cancer. He comes in due to cardiac concerns prior to the planned surgery.      Heart Problem  This is a chronic problem. The current episode started more than 1 year ago. The problem occurs constantly. Pertinent negatives include no abdominal pain, anorexia, arthralgias, change in bowel habit, chest pain, chills, congestion, coughing, diaphoresis, fatigue, fever, headaches, joint swelling, myalgias, nausea, neck pain, numbness, rash, sore throat, swollen glands, urinary symptoms, vertigo, visual change, vomiting or weakness.   Cerebrovascular Accident  Pertinent negatives include no abdominal pain, anorexia, arthralgias, change in bowel habit, chest pain, chills, congestion, coughing, diaphoresis, fatigue, fever, headaches, joint swelling, myalgias, nausea, neck pain, numbness, rash, sore throat, swollen glands, urinary symptoms, vertigo, visual change, vomiting or weakness.       Review of Systems   Constitutional: Negative for chills, diaphoresis, fatigue, fever and malaise/fatigue.   HENT:  Negative for congestion, nosebleeds, sore throat and tinnitus.    Eyes:  Negative for double vision, vision loss in left eye and vision loss in right eye.   Cardiovascular:  Negative for chest pain, claudication, dyspnea on exertion, irregular heartbeat, leg swelling, near-syncope, orthopnea, palpitations, paroxysmal nocturnal dyspnea and syncope.   Respiratory:  Negative for cough, hemoptysis, shortness of breath and wheezing.    Endocrine: Negative for cold intolerance and heat intolerance.   Hematologic/Lymphatic: Negative for bleeding problem. Does not bruise/bleed easily.   Skin:  Negative  "for color change and rash.   Musculoskeletal:  Negative for arthralgias, back pain, falls, joint swelling, muscle weakness, myalgias and neck pain.   Gastrointestinal:  Negative for abdominal pain, anorexia, change in bowel habit, diarrhea, dysphagia, heartburn, hematemesis, hematochezia, hemorrhoids, jaundice, melena, nausea and vomiting.   Genitourinary:  Negative for dysuria and hematuria.   Neurological:  Negative for dizziness, focal weakness, headaches, light-headedness, loss of balance, numbness, tremors, vertigo and weakness.   Psychiatric/Behavioral:  Negative for altered mental status, depression and memory loss. The patient is not nervous/anxious.    Allergic/Immunologic: Negative for hives and persistent infections.       Current Outpatient Medications on File Prior to Visit   Medication Sig Dispense Refill    aspirin (ECOTRIN) 81 MG EC tablet Take 81 mg by mouth once daily.      atorvastatin (LIPITOR) 20 MG tablet Take 1 tablet (20 mg total) by mouth once daily. 90 tablet 3    ketoconazole (NIZORAL) 2 % cream AAA bid (Patient taking differently: as needed. AAA bid) 60 g 3    multivitamin (ONE DAILY MULTIVITAMIN) per tablet Take 1 tablet by mouth once daily.      tamsulosin (FLOMAX) 0.4 mg Cap TAKE 1 CAPSULE ONE TIME DAILY 90 capsule 3     No current facility-administered medications on file prior to visit.       /78 (BP Location: Right arm, Patient Position: Sitting, BP Method: Large (Manual))   Pulse (!) 55   Ht 5' 10" (1.778 m)   Wt 83.9 kg (185 lb)   SpO2 96%   BMI 26.54 kg/m²     Objective     Physical Exam  Constitutional:       General: He is not in acute distress.     Appearance: Normal appearance. He is well-developed. He is not toxic-appearing or diaphoretic.   HENT:      Head: Normocephalic and atraumatic.      Nose: Nose normal.   Eyes:      General:         Right eye: No discharge.         Left eye: No discharge.      Conjunctiva/sclera:      Right eye: Right conjunctiva is not " injected.      Left eye: Left conjunctiva is not injected.      Pupils: Pupils are equal.      Right eye: Pupil is round.      Left eye: Pupil is round.   Neck:      Thyroid: No thyromegaly.      Vascular: No carotid bruit or JVD.   Cardiovascular:      Rate and Rhythm: Normal rate and regular rhythm. No extrasystoles are present.     Chest Wall: PMI is not displaced.      Pulses:           Radial pulses are 2+ on the right side and 2+ on the left side.        Femoral pulses are 2+ on the right side and 2+ on the left side.       Dorsalis pedis pulses are 2+ on the right side and 2+ on the left side.        Posterior tibial pulses are 2+ on the right side and 2+ on the left side.      Heart sounds: S1 normal and S2 normal.      No gallop.   Pulmonary:      Effort: Pulmonary effort is normal.      Breath sounds: Normal breath sounds.   Abdominal:      Palpations: Abdomen is soft.      Tenderness: There is no abdominal tenderness.   Musculoskeletal:      Cervical back: Neck supple.      Right lower leg: Normal. No swelling. No edema.      Left lower leg: Normal. No swelling. No edema.   Lymphadenopathy:      Head:      Right side of head: No submandibular adenopathy.      Left side of head: No submandibular adenopathy.      Cervical: No cervical adenopathy.   Skin:     General: Skin is warm and dry.      Findings: No rash.      Nails: There is no clubbing.   Neurological:      General: No focal deficit present.      Mental Status: He is alert and oriented to person, place, and time. He is not disoriented.      Cranial Nerves: No cranial nerve deficit.   Psychiatric:         Attention and Perception: Attention normal.         Mood and Affect: Mood and affect normal.         Speech: Speech normal.         Behavior: Behavior normal.         Thought Content: Thought content normal.         Cognition and Memory: Cognition and memory normal.         Judgment: Judgment normal.         Assessment and Plan     1. S/P patent  foramen ovale closure    2. Atrioventricular block, first degree    3. History of ischemic vertebrobasilar artery brainstem stroke    4. Atherosclerosis of aorta    5. Primary hypertension    6. Overweight    7. Prostate cancer        Plan:    History of Closed PFO  2019: Cryptogenic CVA.  2019 & 2021: PFO closure.  9/27/2021: Echo: No shunting.  On aspirin 81 mg Q24.  2026: Plan next Echo.    2. Atrioventricular Block, First Degree   5/20/2024: ECG: AV1.  ms.   To be followed.    3. History of Cerebrovascular Accident   2019: CVA: Affected vision.    4. Atherosclerosis of Aorta   2024: Mentioned in chart.   On atorvastatin 20 mg Q24.   4/13/2014: Chol 171. HDL 64. LDL 98. TG 46.   On atorvastatin 20 mg Q24.   Favorable lipid panel.   On aspirin 81 mg Q24.    5. Hypertension   2019: Diagnosed.   Appears well controlled without any antihypertensive.    6. Overweight   6/10/2024: Weight 84 kg. BMI 27.    7. Prostate Cancer   2023: Diagnosed.   2024: Plan is prostatectomy.    8. Primary Care   In transition.    9. Pre Operative Cardiovascular Evaluation   2024: Plan is prostatectomy. Dr. Jonas Moncada.   Low cardiac risk.    F/u 3 months.    Dawit Claudio M.D.      Copy:    Dr. Jonas Moncada.

## 2024-07-08 NOTE — PROGRESS NOTES
Subjective:      Gt Banerjee is a 63 y.o. male who returns today regarding his      Preop visit  No previous ab surgeries     Saw Dr Claudio for 2nd cardiology opinion9. Pre Operative Cardiovascular Evaluation:              2024: Plan is prostatectomy. Dr. Jonas Moncada.              Low cardiac risk.     F/u 3 months.     Dawit Claudio M.D.   .     The following portions of the patient's history were reviewed and updated as appropriate: allergies, current medications, past family history, past medical history, past social history, past surgical history and problem list.     Review of Systems  Pertinent items are noted in HPI.  A comprehensive multipoint review of systems was negative except as otherwise stated in the HPI.          Past Medical History:   Diagnosis Date    Prostate cancer      Stroke              Past Surgical History:   Procedure Laterality Date    PROSTATE BIOPSY   10/24/2023    TRANSESOPHAGEAL ECHOCARDIOGRAPHY   02/17/2021     Procedure: Echocardiogram, Transesophageal;  Surgeon: Nhung Humphreys MD;  Location: [facility] CATH LAB;  Service: Cardiology;;    VASECTOMY             Review of patient's allergies indicates:  No Known Allergies        Objective:   Vitals: There were no vitals taken for this visit.     Physical Exam   General: alert and oriented, no acute distress  Respiratory: Symmetric expansion, non-labored breathing  Cardiovascular: no peripheral edema  Abdomen: soft, non distended  Skin: normal coloration and turgor, no rashes, no suspicious skin lesions noted  Neuro: no gross deficits  Psych: normal judgment and insight, normal mood/affect, and non-anxious     Physical Exam     Lab Review   Urinalysis demonstrates no specimen           Lab Results   Component Value Date     WBC 4.23 04/13/2023     HGB 15.4 04/13/2023     HCT 45.6 04/13/2023     MCV 97 04/13/2023      04/13/2023            Lab Results   Component Value Date     CREATININE 0.8 03/11/2024     BUN 22  03/11/2024            Lab Results   Component Value Date     PSADIAG 7.5 (H) 09/28/2023     PSADIAG 6.6 (H) 01/12/2023     PSADIAG 4.0 11/16/2020     PSATOTAL 7.2 (H) 01/18/2023     PSATOTAL 2.8 06/08/2021     PSAFREE 1.12 01/18/2023     PSAFREE 0.45 06/08/2021     PSAFREEPCT 15.56 01/18/2023     PSAFREEPCT 16.07 06/08/2021               Final Pathologic Diagnosis 1. Prostate, left apex, biopsy:      - Prostatic acinar adenocarcinoma      - Kristin grade: 3+4, Group 2      - The percentage of Bayard grade 4: 25%      - Cribriform pattern 4: present      - Four of four cores are positive for carcinoma      - The percentage of tissue with carcinoma: 5%, 10%, 50% and 50%      - The linear amount of tissue with carcinoma: 0.5 mm, 1 mm, 6 mm and 8 mm      - Perineural invasion seen      2. Prostate, left middle, biopsy:      - Prostatic acinar adenocarcinoma      - Bayard grade: 3+3, Group 1      - Two of two cores positive for carcinoma      - The percentage of tissue with carcinoma: 10% and 15%      - The linear amount of tissue with carcinoma: 1 mm and 2 mm      3. Prostate, left base, biopsy:      - Prostatic acinar adenocarcinoma      - Bayard grade: 3+4, Group 2      - The percentage of Kristin grade 4: 40%      - Cribriform pattern 4: present      - Two of two cores are positive for carcinoma      - The percentage of tissue with carcinoma: 20% and 20%      - The linear amount of tissue with carcinoma: 1 mm and 2 mm      - Perineural invasion seen      - Benign seminal vesicle tissue      4. Prostate, right apex, biopsy:      - Benign prostatic tissue      5. Prostate, right middle, biopsy:      - Benign prostatic tissue      6. Prostate, right base, biopsy:      - Benign prostatic tissue   Comment: Interp By Shikha Ozuna M.D., Signed on 03/27/2024 at 09:54      8/14 cores +     Imaging  MRI neg (limimted study)           Imaging  PSMA PET no nets           Assessment and Plan:   Prostate cancer        Kristin 7  3+4 cT1c cN0 cMx; psa 7.5 unfavorable intermediate risk        We discussed his grade and stage of disease, life expectancy and active surveillance vs treatment.  We discussed the roles of surgery, radiation (external and brachytherapy), HIFU and Cryosurgery, hormonal therapy and chemotherapy in the treatment of prostate cancer.  We discussed brachytherapy and external radiation therapy and open and robotic surgery.  We discussed the risks and benefits of each. We discussed erectile dysfunction, urinary incontinence and bowel issues.  We discussed referral to radiation oncology to further discuss options.  I answered his questions.        He saw cardiology, Dr Claudio, and rad onc, Dr Eaton, and would like to proceed with robotic prostatectomy and PLND 7/18         OK to continue taking ASA; He does not need to stop this     I spent 45- min on the day of this encounter preparing for, treating and managing the above

## 2024-07-12 ENCOUNTER — HOSPITAL ENCOUNTER (OUTPATIENT)
Dept: PREADMISSION TESTING | Facility: OTHER | Age: 64
Discharge: HOME | End: 2024-07-12
Attending: UROLOGY
Payer: MEDICARE

## 2024-07-12 ENCOUNTER — OFFICE VISIT (OUTPATIENT)
Dept: UROLOGY | Facility: CLINIC | Age: 64
End: 2024-07-12
Payer: MEDICARE

## 2024-07-12 VITALS
DIASTOLIC BLOOD PRESSURE: 81 MMHG | BODY MASS INDEX: 26.48 KG/M2 | WEIGHT: 184.94 LBS | OXYGEN SATURATION: 97 % | SYSTOLIC BLOOD PRESSURE: 128 MMHG | HEIGHT: 70 IN | HEART RATE: 58 BPM

## 2024-07-12 DIAGNOSIS — Z01.818 PRE-OP TESTING: ICD-10-CM

## 2024-07-12 DIAGNOSIS — C61 PROSTATE CANCER: Primary | ICD-10-CM

## 2024-07-12 LAB
ABO + RH BLD: NORMAL
ANION GAP SERPL CALC-SCNC: 8 MMOL/L (ref 8–16)
BASOPHILS # BLD AUTO: 0.03 K/UL (ref 0–0.2)
BASOPHILS NFR BLD: 0.6 % (ref 0–1.9)
BLD GP AB SCN CELLS X3 SERPL QL: NORMAL
BUN SERPL-MCNC: 27 MG/DL (ref 8–23)
CALCIUM SERPL-MCNC: 9.7 MG/DL (ref 8.7–10.5)
CHLORIDE SERPL-SCNC: 103 MMOL/L (ref 95–110)
CO2 SERPL-SCNC: 27 MMOL/L (ref 23–29)
CREAT SERPL-MCNC: 1 MG/DL (ref 0.5–1.4)
DIFFERENTIAL METHOD BLD: ABNORMAL
EOSINOPHIL # BLD AUTO: 0.1 K/UL (ref 0–0.5)
EOSINOPHIL NFR BLD: 1 % (ref 0–8)
ERYTHROCYTE [DISTWIDTH] IN BLOOD BY AUTOMATED COUNT: 11.5 % (ref 11.5–14.5)
EST. GFR  (NO RACE VARIABLE): >60 ML/MIN/1.73 M^2
GLUCOSE SERPL-MCNC: 91 MG/DL (ref 70–110)
HCT VFR BLD AUTO: 44.9 % (ref 40–54)
HGB BLD-MCNC: 15.7 G/DL (ref 14–18)
IMM GRANULOCYTES # BLD AUTO: 0.01 K/UL (ref 0–0.04)
IMM GRANULOCYTES NFR BLD AUTO: 0.2 % (ref 0–0.5)
LYMPHOCYTES # BLD AUTO: 1.3 K/UL (ref 1–4.8)
LYMPHOCYTES NFR BLD: 26.6 % (ref 18–48)
MCH RBC QN AUTO: 33.2 PG (ref 27–31)
MCHC RBC AUTO-ENTMCNC: 35 G/DL (ref 32–36)
MCV RBC AUTO: 95 FL (ref 82–98)
MONOCYTES # BLD AUTO: 0.6 K/UL (ref 0.3–1)
MONOCYTES NFR BLD: 11.1 % (ref 4–15)
NEUTROPHILS # BLD AUTO: 3 K/UL (ref 1.8–7.7)
NEUTROPHILS NFR BLD: 60.5 % (ref 38–73)
NRBC BLD-RTO: 0 /100 WBC
PLATELET # BLD AUTO: 194 K/UL (ref 150–450)
PMV BLD AUTO: 9.9 FL (ref 9.2–12.9)
POTASSIUM SERPL-SCNC: 4.5 MMOL/L (ref 3.5–5.1)
RBC # BLD AUTO: 4.73 M/UL (ref 4.6–6.2)
SODIUM SERPL-SCNC: 138 MMOL/L (ref 136–145)
SPECIMEN OUTDATE: NORMAL
WBC # BLD AUTO: 4.96 K/UL (ref 3.9–12.7)

## 2024-07-12 PROCEDURE — 36415 COLL VENOUS BLD VENIPUNCTURE: CPT | Mod: HCNC | Performed by: UROLOGY

## 2024-07-12 PROCEDURE — 85025 COMPLETE CBC W/AUTO DIFF WBC: CPT | Mod: HCNC | Performed by: UROLOGY

## 2024-07-12 PROCEDURE — 80048 BASIC METABOLIC PNL TOTAL CA: CPT | Mod: HCNC | Performed by: UROLOGY

## 2024-07-12 PROCEDURE — 86850 RBC ANTIBODY SCREEN: CPT | Mod: HCNC | Performed by: UROLOGY

## 2024-07-12 PROCEDURE — 86900 BLOOD TYPING SEROLOGIC ABO: CPT | Mod: HCNC | Performed by: UROLOGY

## 2024-07-12 NOTE — PRE ADMISSION SCREENING
Advised to be compliant with the ASV  Loose weight  Need 2 week download data Labs sent to Dr. Moncada.

## 2024-07-17 ENCOUNTER — TELEPHONE (OUTPATIENT)
Dept: UROLOGY | Facility: CLINIC | Age: 64
End: 2024-07-17
Payer: MEDICARE

## 2024-07-18 ENCOUNTER — ANESTHESIA (OUTPATIENT)
Dept: SURGERY | Facility: OTHER | Age: 64
End: 2024-07-18
Payer: MEDICARE

## 2024-07-18 PROCEDURE — 63600175 PHARM REV CODE 636 W HCPCS: Mod: HCNC | Performed by: UROLOGY

## 2024-07-18 PROCEDURE — 63600175 PHARM REV CODE 636 W HCPCS: Mod: HCNC | Performed by: ANESTHESIOLOGY

## 2024-07-18 PROCEDURE — 25000003 PHARM REV CODE 250: Mod: HCNC | Performed by: NURSE ANESTHETIST, CERTIFIED REGISTERED

## 2024-07-18 PROCEDURE — 25000003 PHARM REV CODE 250: Mod: HCNC | Performed by: UROLOGY

## 2024-07-18 PROCEDURE — 63600175 PHARM REV CODE 636 W HCPCS: Mod: HCNC

## 2024-07-18 PROCEDURE — C9290 INJ, BUPIVACAINE LIPOSOME: HCPCS | Mod: HCNC | Performed by: ANESTHESIOLOGY

## 2024-07-18 PROCEDURE — 64488 TAP BLOCK BI INJECTION: CPT | Mod: HCNC | Performed by: ANESTHESIOLOGY

## 2024-07-18 PROCEDURE — P9045 ALBUMIN (HUMAN), 5%, 250 ML: HCPCS | Mod: JZ,JG,HCNC | Performed by: NURSE ANESTHETIST, CERTIFIED REGISTERED

## 2024-07-18 PROCEDURE — 63600175 PHARM REV CODE 636 W HCPCS: Mod: HCNC | Performed by: NURSE ANESTHETIST, CERTIFIED REGISTERED

## 2024-07-18 RX ORDER — EPHEDRINE SULFATE 50 MG/ML
INJECTION, SOLUTION INTRAVENOUS
Status: DISCONTINUED | OUTPATIENT
Start: 2024-07-18 | End: 2024-07-18

## 2024-07-18 RX ORDER — ROCURONIUM BROMIDE 10 MG/ML
INJECTION, SOLUTION INTRAVENOUS
Status: DISCONTINUED | OUTPATIENT
Start: 2024-07-18 | End: 2024-07-18

## 2024-07-18 RX ORDER — KETAMINE HCL IN 0.9 % NACL 50 MG/5 ML
SYRINGE (ML) INTRAVENOUS
Status: DISCONTINUED | OUTPATIENT
Start: 2024-07-18 | End: 2024-07-18

## 2024-07-18 RX ORDER — BUPIVACAINE HYDROCHLORIDE 2.5 MG/ML
INJECTION, SOLUTION EPIDURAL; INFILTRATION; INTRACAUDAL
Status: COMPLETED | OUTPATIENT
Start: 2024-07-18 | End: 2024-07-18

## 2024-07-18 RX ORDER — LIDOCAINE HYDROCHLORIDE 20 MG/ML
INJECTION INTRAVENOUS
Status: DISCONTINUED | OUTPATIENT
Start: 2024-07-18 | End: 2024-07-18

## 2024-07-18 RX ORDER — MIDAZOLAM HYDROCHLORIDE 1 MG/ML
INJECTION INTRAMUSCULAR; INTRAVENOUS
Status: DISCONTINUED | OUTPATIENT
Start: 2024-07-18 | End: 2024-07-18

## 2024-07-18 RX ORDER — VECURONIUM BROMIDE FOR INJECTION 1 MG/ML
INJECTION, POWDER, LYOPHILIZED, FOR SOLUTION INTRAVENOUS
Status: DISCONTINUED | OUTPATIENT
Start: 2024-07-18 | End: 2024-07-18

## 2024-07-18 RX ORDER — DEXAMETHASONE SODIUM PHOSPHATE 4 MG/ML
INJECTION, SOLUTION INTRA-ARTICULAR; INTRALESIONAL; INTRAMUSCULAR; INTRAVENOUS; SOFT TISSUE
Status: DISCONTINUED | OUTPATIENT
Start: 2024-07-18 | End: 2024-07-18

## 2024-07-18 RX ORDER — ALBUMIN HUMAN 50 G/1000ML
SOLUTION INTRAVENOUS
Status: DISCONTINUED | OUTPATIENT
Start: 2024-07-18 | End: 2024-07-18

## 2024-07-18 RX ORDER — ONDANSETRON HYDROCHLORIDE 2 MG/ML
INJECTION, SOLUTION INTRAVENOUS
Status: DISCONTINUED | OUTPATIENT
Start: 2024-07-18 | End: 2024-07-18

## 2024-07-18 RX ORDER — DEXMEDETOMIDINE HYDROCHLORIDE 100 UG/ML
INJECTION, SOLUTION INTRAVENOUS CONTINUOUS PRN
Status: DISCONTINUED | OUTPATIENT
Start: 2024-07-18 | End: 2024-07-18

## 2024-07-18 RX ORDER — PROPOFOL 10 MG/ML
VIAL (ML) INTRAVENOUS
Status: DISCONTINUED | OUTPATIENT
Start: 2024-07-18 | End: 2024-07-18

## 2024-07-18 RX ADMIN — VECURONIUM BROMIDE FOR INJECTION 2 MG: 1 INJECTION, POWDER, LYOPHILIZED, FOR SOLUTION INTRAVENOUS at 07:07

## 2024-07-18 RX ADMIN — PROPOFOL 150 MG: 10 INJECTION, EMULSION INTRAVENOUS at 07:07

## 2024-07-18 RX ADMIN — VECURONIUM BROMIDE FOR INJECTION 2 MG: 1 INJECTION, POWDER, LYOPHILIZED, FOR SOLUTION INTRAVENOUS at 08:07

## 2024-07-18 RX ADMIN — DEXMEDETOMIDINE HYDROCHLORIDE 0.5 MCG/KG/HR: 100 INJECTION, SOLUTION, CONCENTRATE INTRAVENOUS at 07:07

## 2024-07-18 RX ADMIN — BUPIVACAINE 20 ML: 13.3 INJECTION, SUSPENSION, LIPOSOMAL INFILTRATION at 06:07

## 2024-07-18 RX ADMIN — BUPIVACAINE HYDROCHLORIDE 40 ML: 2.5 INJECTION, SOLUTION EPIDURAL; INFILTRATION; INTRACAUDAL; PERINEURAL at 06:07

## 2024-07-18 RX ADMIN — VECURONIUM BROMIDE FOR INJECTION 1 MG: 1 INJECTION, POWDER, LYOPHILIZED, FOR SOLUTION INTRAVENOUS at 10:07

## 2024-07-18 RX ADMIN — EPHEDRINE SULFATE 10 MG: 50 INJECTION INTRAVENOUS at 09:07

## 2024-07-18 RX ADMIN — DEXAMETHASONE SODIUM PHOSPHATE 8 MG: 4 INJECTION, SOLUTION INTRAMUSCULAR; INTRAVENOUS at 07:07

## 2024-07-18 RX ADMIN — ALBUMIN (HUMAN) 250 ML: 12.5 SOLUTION INTRAVENOUS at 07:07

## 2024-07-18 RX ADMIN — SODIUM CHLORIDE, SODIUM LACTATE, POTASSIUM CHLORIDE, AND CALCIUM CHLORIDE: 600; 310; 30; 20 INJECTION, SOLUTION INTRAVENOUS at 06:07

## 2024-07-18 RX ADMIN — ALBUMIN (HUMAN) 250 ML: 12.5 SOLUTION INTRAVENOUS at 08:07

## 2024-07-18 RX ADMIN — MIDAZOLAM HYDROCHLORIDE 2 MG: 1 INJECTION INTRAMUSCULAR; INTRAVENOUS at 06:07

## 2024-07-18 RX ADMIN — SUGAMMADEX 400 MG: 100 INJECTION, SOLUTION INTRAVENOUS at 10:07

## 2024-07-18 RX ADMIN — CEFAZOLIN 2 G: 2 INJECTION, POWDER, FOR SOLUTION INTRAMUSCULAR; INTRAVENOUS at 07:07

## 2024-07-18 RX ADMIN — VECURONIUM BROMIDE FOR INJECTION 1 MG: 1 INJECTION, POWDER, LYOPHILIZED, FOR SOLUTION INTRAVENOUS at 09:07

## 2024-07-18 RX ADMIN — ROCURONIUM BROMIDE 50 MG: 10 INJECTION, SOLUTION INTRAVENOUS at 07:07

## 2024-07-18 RX ADMIN — SODIUM CHLORIDE, SODIUM LACTATE, POTASSIUM CHLORIDE, AND CALCIUM CHLORIDE: 600; 310; 30; 20 INJECTION, SOLUTION INTRAVENOUS at 08:07

## 2024-07-18 RX ADMIN — GLYCOPYRROLATE 0.2 MG: 0.2 INJECTION, SOLUTION INTRAMUSCULAR; INTRAVITREAL at 07:07

## 2024-07-18 RX ADMIN — ONDANSETRON HYDROCHLORIDE 4 MG: 2 INJECTION INTRAMUSCULAR; INTRAVENOUS at 07:07

## 2024-07-18 RX ADMIN — LIDOCAINE HYDROCHLORIDE 50 MG: 20 INJECTION, SOLUTION INTRAVENOUS at 07:07

## 2024-07-18 RX ADMIN — EPHEDRINE SULFATE 10 MG: 50 INJECTION INTRAVENOUS at 08:07

## 2024-07-18 RX ADMIN — VECURONIUM BROMIDE FOR INJECTION 2 MG: 1 INJECTION, POWDER, LYOPHILIZED, FOR SOLUTION INTRAVENOUS at 09:07

## 2024-07-18 RX ADMIN — Medication 50 MG: at 07:07

## 2024-07-18 NOTE — TRANSFER OF CARE
"Anesthesia Transfer of Care Note    Patient: Gt Banerjee    Procedure(s) Performed: Procedure(s) (LRB):  ROBOTIC PROSTATECTOMY (N/A)  ROBOTIC LYMPHADENECTOMY, PELVIS (Bilateral)    Patient location: PACU    Anesthesia Type: general    Transport from OR: Transported from OR on 6-10 L/min O2 by face mask with adequate spontaneous ventilation    Post pain: adequate analgesia    Post assessment: no apparent anesthetic complications    Post vital signs: stable    Level of consciousness: awake    Nausea/Vomiting: no nausea/vomiting    Complications: none    Transfer of care protocol was followed      Last vitals: Visit Vitals  /79 (BP Location: Right arm, Patient Position: Sitting)   Pulse (!) 59   Temp 36.6 °C (97.9 °F) (Oral)   Resp (!) 59   Ht 5' 10" (1.778 m)   Wt 85.7 kg (189 lb)   SpO2 97%   BMI 27.12 kg/m²     "

## 2024-07-18 NOTE — ANESTHESIA POSTPROCEDURE EVALUATION
Anesthesia Post Evaluation    Patient: Gt Banerjee    Procedure(s) Performed: Procedure(s) (LRB):  ROBOTIC PROSTATECTOMY (N/A)  ROBOTIC LYMPHADENECTOMY, PELVIS (Bilateral)    Final Anesthesia Type: general      Patient location during evaluation: PACU  Patient participation: Yes- Able to Participate  Level of consciousness: awake and alert  Post-procedure vital signs: reviewed and stable  Pain management: adequate  Airway patency: patent    PONV status at discharge: No PONV  Anesthetic complications: no      Cardiovascular status: blood pressure returned to baseline  Respiratory status: unassisted  Hydration status: euvolemic  Follow-up not needed.              Vitals Value Taken Time   /58 07/18/24 1224   Temp 36 °C (96.8 °F) 07/18/24 1224   Pulse 53 07/18/24 1224   Resp 14 07/18/24 1224   SpO2 98 % 07/18/24 1224   Vitals shown include unfiled device data.      Event Time   Out of Recovery 11:46:00         Pain/Araceli Score: Araceli Score: 9 (7/18/2024 11:30 AM)

## 2024-07-18 NOTE — ANESTHESIA PROCEDURE NOTES
Intubation    Date/Time: 7/18/2024 7:15 AM    Performed by: Brianda Busby CRNA  Authorized by: Leo Jackson MD    Intubation:     Induction:  Intravenous    Intubated:  Postinduction    Mask Ventilation:  Easy with oral airway    Attempts:  1    Attempted By:  CRNA    Method of Intubation:  Video laryngoscopy    Blade:  Sumner 4    Laryngeal View Grade: Grade I - full view of cords      Difficult Airway Encountered?: No      Complications:  None    Airway Device:  Oral endotracheal tube    Airway Device Size:  8.0    Style/Cuff Inflation:  Cuffed (inflated to minimal occlusive pressure)    Secured at:  The lips    Placement Verified By:  Capnometry    Complicating Factors:  None    Findings Post-Intubation:  BS equal bilateral and atraumatic/condition of teeth unchanged

## 2024-07-18 NOTE — ANESTHESIA PROCEDURE NOTES
Bilateral TAP    Patient location during procedure: holding area   Block not for primary anesthetic.  Reason for block: at surgeon's request and post-op pain management   Post-op Pain Location: abdomen   Timeout: 7/18/2024 6:30 AM   End time: 7/18/2024 6:40 AM    Staffing  Authorizing Provider: Leo Jackson MD  Performing Provider: Leo Jackson MD    Staffing  Performed by: Leo Jackson MD  Authorized by: Leo Jackson MD    Preanesthetic Checklist  Completed: patient identified, IV checked, site marked, risks and benefits discussed, surgical consent, monitors and equipment checked, pre-op evaluation and timeout performed  Peripheral Block  Patient position: supine  Prep: ChloraPrep and site prepped and draped  Patient monitoring: heart rate and continuous pulse ox  Block type: TAP  Laterality: bilateral  Injection technique: single shot  Needle  Needle type: Echogenic   Needle gauge: 20 G  Needle length: 4 in  Needle localization: anatomical landmarks and ultrasound guidance   -ultrasound image captured on disc.  Assessment  Injection assessment: negative aspiration, negative parasthesia and local visualized surrounding nerve  Paresthesia pain: none  Heart rate change: no  Slow fractionated injection: yes  Pain Tolerance: comfortable throughout block and no complaints  Medications:    Medications: bupivacaine (pf) (MARCAINE) injection 0.25% - Perineural   40 mL - 7/18/2024 6:40:00 AM    Additional Notes  Local visualized to spread between internal oblique and transversus abdominis  Each side injected with 20 cc 0.25% Marcaine plus 10 cc Exparel

## 2024-07-22 ENCOUNTER — PATIENT OUTREACH (OUTPATIENT)
Dept: ADMINISTRATIVE | Facility: CLINIC | Age: 64
End: 2024-07-22
Payer: MEDICARE

## 2024-07-22 NOTE — PROGRESS NOTES
C3 nurse spoke with Gt Banerjee for a TCC post hospital discharge follow up call. Patient declined follow up appt with PCP at this time.

## 2024-07-26 ENCOUNTER — OFFICE VISIT (OUTPATIENT)
Dept: UROLOGY | Facility: CLINIC | Age: 64
End: 2024-07-26
Payer: MEDICARE

## 2024-07-26 VITALS
BODY MASS INDEX: 27.15 KG/M2 | RESPIRATION RATE: 18 BRPM | WEIGHT: 189.63 LBS | DIASTOLIC BLOOD PRESSURE: 72 MMHG | HEIGHT: 70 IN | HEART RATE: 63 BPM | SYSTOLIC BLOOD PRESSURE: 116 MMHG

## 2024-07-26 DIAGNOSIS — C61 PROSTATE CANCER: ICD-10-CM

## 2024-07-26 DIAGNOSIS — N52.9 ERECTILE DYSFUNCTION, UNSPECIFIED ERECTILE DYSFUNCTION TYPE: ICD-10-CM

## 2024-07-26 DIAGNOSIS — R50.9 LOW GRADE FEVER: Primary | ICD-10-CM

## 2024-07-26 PROCEDURE — 87086 URINE CULTURE/COLONY COUNT: CPT | Mod: HCNC | Performed by: NURSE PRACTITIONER

## 2024-07-26 PROCEDURE — 87088 URINE BACTERIA CULTURE: CPT | Mod: HCNC | Performed by: NURSE PRACTITIONER

## 2024-07-26 PROCEDURE — 87186 SC STD MICRODIL/AGAR DIL: CPT | Mod: HCNC | Performed by: NURSE PRACTITIONER

## 2024-07-26 RX ORDER — TADALAFIL 20 MG/1
20 TABLET ORAL
Qty: 30 TABLET | Refills: 3 | Status: SHIPPED | OUTPATIENT
Start: 2024-07-26 | End: 2025-07-26

## 2024-07-26 RX ORDER — CEPHALEXIN 500 MG/1
500 CAPSULE ORAL EVERY 12 HOURS
Qty: 14 CAPSULE | Refills: 0 | Status: SHIPPED | OUTPATIENT
Start: 2024-07-26 | End: 2024-08-02

## 2024-07-26 NOTE — PROGRESS NOTES
Subjective:      Gt Banerjee is a 63 y.o. male who returns today regarding his pickering catheter.    The patient is s/p RALP with BPLND 7/18/24 with Dr. Moncada. Discharged on POD #1.     He presents today for VT. Doing well. More bladder pain/urethral discomfort over the last 2 days. Low grade fever yesterday 100.6 F, resolved. He reports significant R flank and testicular bruising. Denies constipation.     The following portions of the patient's history were reviewed and updated as appropriate: allergies, current medications, past family history, past medical history, past social history, past surgical history and problem list.    Review of Systems  Constitutional: no fever or chills  ENT: no nasal congestion or sore throat  Respiratory: no cough or shortness of breath  Cardiovascular: no chest pain or palpitations  Gastrointestinal: no nausea or vomiting, tolerating diet  Genitourinary: as per HPI  Hematologic/Lymphatic: no easy bruising or lymphadenopathy  Musculoskeletal: no arthralgias or myalgias  Neurological: no seizures or tremors  Behavioral/Psych: no auditory or visual hallucinations     Objective:   Vitals:   Vitals:    07/26/24 0833   BP: 116/72   Pulse: 63   Resp: 18     Physical Exam   General: alert and oriented, no acute distress  Head: normocephalic, atraumatic  Neck: supple, normal ROM  Respiratory: Symmetric expansion, non-labored breathing  Cardiovascular: regular rate and rhythm  Abdomen: soft, non tender, non distended; R flank hematoma, soft resolving   Genitourinary:   Penis: normal, no lesions, patent orthotopic meatus, no plaques; pickering to gravity draining clear glenn urine  Scrotum: ecchymosis R>L  Testes: descended bilaterally, no masses, nontender, normal epididymides bilaterally, no hydroceles  Skin: normal coloration and turgor, no rashes, no suspicious skin lesions noted  Neuro: alert and oriented x3, no gross deficits  Psych: normal judgment and insight, normal mood/affect, and  "non-anxious    Lab Review   Urinalysis demonstrates : no specimen, pickering   Lab Results   Component Value Date    WBC 4.96 07/12/2024    HGB 15.7 07/12/2024    HCT 44.9 07/12/2024    MCV 95 07/12/2024     07/12/2024     Lab Results   Component Value Date    CREATININE 1.0 07/12/2024    BUN 27 (H) 07/12/2024     No results found for: "PSA"  Imaging   None    Voiding Trial (Fill/Pull/Void): 200cc of sterile saline was instilled into the bladder through the previously placed pickering catheter.  The pickering balloon was then deflated and the pickering removed.  The patient subsequently voided 200cc, consistent with successful voiding trial.  The pickering was not replaced.    Assessment:     1. Low grade fever    2. Prostate cancer    3. Erectile dysfunction, unspecified erectile dysfunction type      Plan:   Diagnoses and all orders for this visit:    Low grade fever  -     Urine culture  -     cephALEXin (KEFLEX) 500 MG capsule; Take 1 capsule (500 mg total) by mouth every 12 (twelve) hours. for 7 days    Prostate cancer  -     Prostate Specific Antigen, Diagnostic; Future    Erectile dysfunction, unspecified erectile dysfunction type  -     tadalafiL (CIALIS) 20 MG Tab; Take 1 tablet (20 mg total) by mouth every 72 hours as needed (ED).    Plan:  --Urine culture  --Keflex BID x 7 days, will adjust if needed based on culture results   --Successful VT  --Pathology pending- Dr. Moncada will notify when available  --Kegels  --Cialis  --Continue to avoid straining for BM and lifting >5 lbs x 6 weeks   --Follow up with Dr. Moncada in 1 month with PSA        "

## 2024-07-27 LAB — BACTERIA UR CULT: ABNORMAL

## 2024-07-29 ENCOUNTER — PATIENT MESSAGE (OUTPATIENT)
Dept: UROLOGY | Facility: CLINIC | Age: 64
End: 2024-07-29
Payer: MEDICARE

## 2024-07-31 ENCOUNTER — PATIENT MESSAGE (OUTPATIENT)
Dept: UROLOGY | Facility: CLINIC | Age: 64
End: 2024-07-31
Payer: MEDICARE

## 2024-08-01 DIAGNOSIS — N30.00 ACUTE CYSTITIS WITHOUT HEMATURIA: Primary | ICD-10-CM

## 2024-08-01 RX ORDER — SULFAMETHOXAZOLE AND TRIMETHOPRIM 800; 160 MG/1; MG/1
1 TABLET ORAL 2 TIMES DAILY
Qty: 20 TABLET | Refills: 0 | Status: SHIPPED | OUTPATIENT
Start: 2024-08-01 | End: 2024-08-11

## 2024-08-12 ENCOUNTER — LAB VISIT (OUTPATIENT)
Dept: LAB | Facility: OTHER | Age: 64
End: 2024-08-12
Attending: NURSE PRACTITIONER
Payer: MEDICARE

## 2024-08-12 DIAGNOSIS — C61 PROSTATE CANCER: ICD-10-CM

## 2024-08-12 LAB — COMPLEXED PSA SERPL-MCNC: 0.08 NG/ML (ref 0–4)

## 2024-08-12 PROCEDURE — 36415 COLL VENOUS BLD VENIPUNCTURE: CPT | Mod: HCNC | Performed by: NURSE PRACTITIONER

## 2024-08-12 PROCEDURE — 84153 ASSAY OF PSA TOTAL: CPT | Mod: HCNC | Performed by: NURSE PRACTITIONER

## 2024-08-19 NOTE — PROGRESS NOTES
Subjective:      Gt Banerjee is a 63 y.o. male who returns today regarding his     4 weeks post op.    Wears 3-4 pad daily    Had UTI  Symptoms resolved with antibiotics      Not doing Kegels    The following portions of the patient's history were reviewed and updated as appropriate: allergies, current medications, past family history, past medical history, past social history, past surgical history and problem list.    Review of Systems  Pertinent items are noted in HPI.  A comprehensive multipoint review of systems was negative except as otherwise stated in the HPI.    Past Medical History:   Diagnosis Date    Prostate cancer     Stroke      Past Surgical History:   Procedure Laterality Date    CLOSURE, PFO  2019    PROSTATE BIOPSY  10/24/2023    ROBOT-ASSISTED LAPAROSCOPIC PELVIC LYMPHADENECTOMY Bilateral 7/18/2024    Procedure: ROBOTIC LYMPHADENECTOMY, PELVIS;  Surgeon: Jonas Moncada MD;  Location: [facility];  Service: Urology;  Laterality: Bilateral;    ROBOT-ASSISTED LAPAROSCOPIC PROSTATECTOMY N/A 7/18/2024    Procedure: ROBOTIC PROSTATECTOMY;  Surgeon: Jonas Moncada MD;  Location: [facility];  Service: Urology;  Laterality: N/A;    TRANSESOPHAGEAL ECHOCARDIOGRAPHY  02/17/2021    Procedure: Echocardiogram, Transesophageal;  Surgeon: Nhung Humphreys MD;  Location: [facility] CATH LAB;  Service: Cardiology;;    VASECTOMY         Review of patient's allergies indicates:  No Known Allergies       Objective:   Vitals: There were no vitals taken for this visit.    Physical Exam   General: alert and oriented, no acute distress  Respiratory: Symmetric expansion, non-labored breathing  Cardiovascular: no peripheral edema  Abdomen: soft, non distended  Skin: normal coloration and turgor, no rashes, no suspicious skin lesions noted  Neuro: no gross deficits  Psych: normal judgment and insight, normal mood/affect, and non-anxious    Physical Exam    Lab Review   Urinalysis demonstrates negative for all  components  Lab Results   Component Value Date    WBC 4.96 2024    HGB 15.7 2024    HCT 44.9 2024    MCV 95 2024     2024     Lab Results   Component Value Date    CREATININE 1.0 2024    BUN 27 (H) 2024     Lab Results   Component Value Date    PSADIAG 0.08 2024    PSADIAG 7.5 (H) 2023    PSADIAG 6.6 (H) 2023    PSATOTAL 7.2 (H) 2023    PSATOTAL 2.8 2021    PSAFREE 1.12 2023    PSAFREE 0.45 2021    PSAFREEPCT 15.56 2023    PSAFREEPCT 16.07 2021     Final Pathologic Diagnosis 1. Periprostatic tissue, excision:  Benign fibrovascular adipose tissue.    Negative for malignancy.    2. Left pelvic lymph nodes, excision:  Seven lymph nodes, negative for metastatic tumor (0/7).    3. Right pelvic lymph nodes, excision:  Thirteen lymph nodes, negative for metastatic tumor (0/13).    4. Prostate, radical prostatectomy:  Prostatic adenocarcinoma, grade group 3 (Kristin score 4+3=7).      Please see attached synoptic report for additional information.    CANCER CASE SUMMARY SYNOPTIC REPORT (PROSTATE GLAND: RADICAL PROSTATECTOMY)    SPECIMEN  Procedure: Radical prostatectomy  Prostate size     Prostate weight in  g     Prostate size in cm:  5.0 x 4.7 x 4.3 cm    TUMOR  Histologic type:  Acinar adenocarcinoma, conventional  Histologic grade:  Grade group 3 ([place] score 4+3=7)  Minor tertiary pattern 5: Not identified  Percentage of pattern 4:  60%  Intraductal carcinoma: Not identified  Cribriform glands: Present  Treatment effect:  No known pre-surgical therapy    TUMOR QUANTITATION  Estimated percentage of prostate involved by tumor:  10%  Extraprostatic extension: Not identified  Urinary bladder neck invasion: Not identified  Seminal vesicle invasion: Not identified  Lymphovascular invasion: Not identified  Perineural invasion: Present    MARGINS  Margin status:  All margins negative for invasive carcinoma    REGIONAL  LYMPH NODES  Regional lymph node status:  All regional lymph nodes negative for tumor  Number of lymph nodes with tumor: 0  Number of lymph nodes examined: 20    DISTANT METASTASIS  Not applicable    pTNM CLASSIFICATION (AJCC 8th Edition)  Modified classification:  Not applicable  pT category:  pT2:  Organ confined  pN category:  pN0:  No positive regional nodes  pM category:  Not applicable    ADDITIONAL FINDINGS  None    SPECIAL STUDIES  None    COMMENTS  Best tumor block for subsequent ancillary studies, if needed:  4F.   Comment: Interp By Abel Rasmussen M.D., Signed on 08/01/2024 at 13:47       Imaging  -  Assessment and Plan:   Prostate cancer  [place] 7 3+4 pT2 pN0 cM0 R0     Discussed AUA/SUO definition of biochemical recurrence:  (PSA of 0.2ng/mL and a confirmatory value of 0.2ng/mL or greater following radical prostatectomy) AUA/SUO GUIDELINE 2023    RTC 3 months with repeat psa to see Dr Antwan Patel  PFPT  Cialis 10mg Q 72 hours  LISA    DC flomax  Add gemtesa    UTI resolved  UA and reflex cs    RTC 3 months with psa to see Dr Moncada

## 2024-08-20 ENCOUNTER — OFFICE VISIT (OUTPATIENT)
Dept: UROLOGY | Facility: CLINIC | Age: 64
End: 2024-08-20
Payer: MEDICARE

## 2024-08-20 VITALS
DIASTOLIC BLOOD PRESSURE: 69 MMHG | WEIGHT: 189.63 LBS | HEART RATE: 69 BPM | SYSTOLIC BLOOD PRESSURE: 110 MMHG | BODY MASS INDEX: 27.2 KG/M2 | RESPIRATION RATE: 17 BRPM

## 2024-08-20 DIAGNOSIS — C61 PROSTATE CANCER: Primary | ICD-10-CM

## 2024-08-20 LAB
AMORPH CRY URNS QL MICRO: ABNORMAL
BACTERIA #/AREA URNS HPF: ABNORMAL /HPF
BILIRUB UR QL STRIP: NEGATIVE
CLARITY UR: ABNORMAL
COLOR UR: ABNORMAL
GLUCOSE UR QL STRIP: NEGATIVE
HGB UR QL STRIP: ABNORMAL
HYALINE CASTS #/AREA URNS LPF: 0 /LPF
KETONES UR QL STRIP: NEGATIVE
LEUKOCYTE ESTERASE UR QL STRIP: ABNORMAL
MICROSCOPIC COMMENT: ABNORMAL
NITRITE UR QL STRIP: NEGATIVE
PH UR STRIP: 6 [PH] (ref 5–8)
PROT UR QL STRIP: ABNORMAL
RBC #/AREA URNS HPF: 6 /HPF (ref 0–4)
SP GR UR STRIP: 1.03 (ref 1–1.03)
SQUAMOUS #/AREA URNS HPF: 0 /HPF
UNIDENT CRYS URNS QL MICRO: ABNORMAL
URN SPEC COLLECT METH UR: ABNORMAL
WBC #/AREA URNS HPF: 25 /HPF (ref 0–5)

## 2024-08-20 PROCEDURE — 99024 POSTOP FOLLOW-UP VISIT: CPT | Mod: HCNC,S$GLB,, | Performed by: UROLOGY

## 2024-08-20 PROCEDURE — 87086 URINE CULTURE/COLONY COUNT: CPT | Mod: HCNC | Performed by: UROLOGY

## 2024-08-20 PROCEDURE — 3074F SYST BP LT 130 MM HG: CPT | Mod: HCNC,CPTII,S$GLB, | Performed by: UROLOGY

## 2024-08-20 PROCEDURE — 81000 URINALYSIS NONAUTO W/SCOPE: CPT | Mod: HCNC | Performed by: UROLOGY

## 2024-08-20 PROCEDURE — 1159F MED LIST DOCD IN RCRD: CPT | Mod: HCNC,CPTII,S$GLB, | Performed by: UROLOGY

## 2024-08-20 PROCEDURE — 3078F DIAST BP <80 MM HG: CPT | Mod: HCNC,CPTII,S$GLB, | Performed by: UROLOGY

## 2024-08-20 RX ORDER — SILDENAFIL 100 MG/1
100 TABLET, FILM COATED ORAL DAILY PRN
Qty: 30 TABLET | Refills: 11 | Status: SHIPPED | OUTPATIENT
Start: 2024-08-20

## 2024-08-20 RX ORDER — VIBEGRON 75 MG/1
75 TABLET, FILM COATED ORAL DAILY
Qty: 30 TABLET | Refills: 11 | Status: SHIPPED | OUTPATIENT
Start: 2024-08-20

## 2024-08-21 ENCOUNTER — PATIENT MESSAGE (OUTPATIENT)
Dept: UROLOGY | Facility: CLINIC | Age: 64
End: 2024-08-21
Payer: MEDICARE

## 2024-08-21 ENCOUNTER — TELEPHONE (OUTPATIENT)
Dept: UROLOGY | Facility: CLINIC | Age: 64
End: 2024-08-21
Payer: MEDICARE

## 2024-08-22 LAB — BACTERIA UR CULT: NORMAL

## 2024-08-28 ENCOUNTER — TELEPHONE (OUTPATIENT)
Dept: OPHTHALMOLOGY | Facility: CLINIC | Age: 64
End: 2024-08-28
Payer: MEDICARE

## 2024-08-28 DIAGNOSIS — H02.831 DERMATOCHALASIS OF BOTH UPPER EYELIDS: Primary | ICD-10-CM

## 2024-08-28 DIAGNOSIS — H02.834 DERMATOCHALASIS OF BOTH UPPER EYELIDS: Primary | ICD-10-CM

## 2024-08-29 ENCOUNTER — PATIENT MESSAGE (OUTPATIENT)
Dept: OPHTHALMOLOGY | Facility: CLINIC | Age: 64
End: 2024-08-29
Payer: MEDICARE

## 2024-09-13 ENCOUNTER — PATIENT MESSAGE (OUTPATIENT)
Dept: CARDIOLOGY | Facility: CLINIC | Age: 64
End: 2024-09-13

## 2024-09-13 ENCOUNTER — OFFICE VISIT (OUTPATIENT)
Dept: CARDIOLOGY | Facility: CLINIC | Age: 64
End: 2024-09-13
Attending: INTERNAL MEDICINE
Payer: MEDICARE

## 2024-09-13 VITALS
HEART RATE: 54 BPM | WEIGHT: 182.75 LBS | SYSTOLIC BLOOD PRESSURE: 120 MMHG | BODY MASS INDEX: 26.16 KG/M2 | HEIGHT: 70 IN | DIASTOLIC BLOOD PRESSURE: 82 MMHG | OXYGEN SATURATION: 97 %

## 2024-09-13 DIAGNOSIS — Z87.74 S/P PATENT FORAMEN OVALE CLOSURE: ICD-10-CM

## 2024-09-13 DIAGNOSIS — N52.9 ERECTILE DYSFUNCTION, UNSPECIFIED ERECTILE DYSFUNCTION TYPE: ICD-10-CM

## 2024-09-13 DIAGNOSIS — I70.0 ATHEROSCLEROSIS OF AORTA: ICD-10-CM

## 2024-09-13 DIAGNOSIS — I44.0 ATRIOVENTRICULAR BLOCK, FIRST DEGREE: ICD-10-CM

## 2024-09-13 DIAGNOSIS — E66.3 OVERWEIGHT: ICD-10-CM

## 2024-09-13 DIAGNOSIS — Z86.73 HISTORY OF ISCHEMIC VERTEBROBASILAR ARTERY BRAINSTEM STROKE: ICD-10-CM

## 2024-09-13 DIAGNOSIS — I10 PRIMARY HYPERTENSION: ICD-10-CM

## 2024-09-13 PROCEDURE — 99999 PR PBB SHADOW E&M-EST. PATIENT-LVL III: CPT | Mod: PBBFAC,HCNC,, | Performed by: INTERNAL MEDICINE

## 2024-09-13 RX ORDER — TADALAFIL 20 MG/1
20 TABLET ORAL
Qty: 30 TABLET | Refills: 3 | COMMUNITY
Start: 2024-09-13 | End: 2024-09-13

## 2024-09-13 RX ORDER — TAMSULOSIN HYDROCHLORIDE 0.4 MG/1
1 CAPSULE ORAL
Qty: 90 CAPSULE | Refills: 3 | COMMUNITY
Start: 2024-09-13 | End: 2024-09-13

## 2024-09-13 NOTE — PROGRESS NOTES
Subjective     Gt Banerjee is a 64 y.o. male with hypertension. 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 He was diagnosed with a patent foramen ovale. He had the PFO closed in 2019 and then an additional procedure in 2021. An echocardiogram in 2021 did not reveal any shunting, He has a first degree atrioventricular block. He was diagnosed with prostate cancer. He underwent robotic prostatectomy on 7/18/2024. He denies any chest pain or exertional shortness of breath. No palpitations or weak spells. Feeling well overall.      Heart Problem  This is a chronic problem. The current episode started more than 1 year ago. The problem occurs constantly. Pertinent negatives include no abdominal pain, anorexia, arthralgias, change in bowel habit, chest pain, chills, congestion, coughing, diaphoresis, fatigue, fever, headaches, joint swelling, myalgias, nausea, neck pain, numbness, rash, sore throat, swollen glands, urinary symptoms, vertigo, visual change, vomiting or weakness.   Cerebrovascular Accident  Pertinent negatives include no abdominal pain, anorexia, arthralgias, change in bowel habit, chest pain, chills, congestion, coughing, diaphoresis, fatigue, fever, headaches, joint swelling, myalgias, nausea, neck pain, numbness, rash, sore throat, swollen glands, urinary symptoms, vertigo, visual change, vomiting or weakness.   Hypertension  Pertinent negatives include no chest pain, headaches, malaise/fatigue, neck pain, orthopnea, palpitations, PND or shortness of breath.       Review of Systems   Constitutional: Negative for chills, diaphoresis, fatigue, fever and malaise/fatigue.   HENT:  Negative for congestion, nosebleeds, sore throat and tinnitus.    Eyes:  Negative for double vision, vision loss in left eye and vision loss in right eye.   Cardiovascular:  Negative for chest pain, claudication, dyspnea on exertion, irregular heartbeat, leg swelling, near-syncope, orthopnea, palpitations, paroxysmal  nocturnal dyspnea and syncope.   Respiratory:  Negative for cough, hemoptysis, shortness of breath and wheezing.    Endocrine: Negative for cold intolerance and heat intolerance.   Hematologic/Lymphatic: Negative for bleeding problem. Does not bruise/bleed easily.   Skin:  Negative for color change and rash.   Musculoskeletal:  Negative for arthralgias, back pain, falls, joint swelling, muscle weakness, myalgias and neck pain.   Gastrointestinal:  Negative for abdominal pain, anorexia, change in bowel habit, diarrhea, dysphagia, heartburn, hematemesis, hematochezia, hemorrhoids, jaundice, melena, nausea and vomiting.   Genitourinary:  Negative for dysuria and hematuria.   Neurological:  Negative for dizziness, focal weakness, headaches, light-headedness, loss of balance, numbness, tremors, vertigo and weakness.   Psychiatric/Behavioral:  Negative for altered mental status, depression and memory loss. The patient is not nervous/anxious.    Allergic/Immunologic: Negative for hives and persistent infections.       Current Outpatient Medications on File Prior to Visit   Medication Sig Dispense Refill    acetaminophen (TYLENOL) 500 MG tablet Take 1 tablet (500 mg total) by mouth every 6 (six) hours as needed for Pain. 20 tablet 0    aspirin (ECOTRIN) 81 MG EC tablet Take 1 tablet (81 mg total) by mouth once daily.      atorvastatin (LIPITOR) 20 MG tablet Take 1 tablet (20 mg total) by mouth once daily. 90 tablet 3    ibuprofen (ADVIL,MOTRIN) 800 MG tablet Take 1 tablet (800 mg total) by mouth every 6 (six) hours as needed for Pain. 20 tablet 0    ketoconazole (NIZORAL) 2 % cream AAA bid (Patient taking differently: as needed. AAA bid) 60 g 3    multivitamin (ONE DAILY MULTIVITAMIN) per tablet Take 1 tablet by mouth once daily.      oxyCODONE (ROXICODONE) 5 MG immediate release tablet Take 1 tablet (5 mg total) by mouth every 6 (six) hours as needed for Pain. 5 tablet 0    polyethylene glycol (GLYCOLAX) 17 gram/dose  "powder Mix 17 grams in 8 oz of fluid and drink by mouth once daily for constipation 510 g 0    sildenafiL (VIAGRA) 100 MG tablet Take 1 tablet (100 mg total) by mouth daily as needed for Erectile Dysfunction. 30 tablet 11    tadalafiL (CIALIS) 20 MG Tab Take 1 tablet (20 mg total) by mouth every 72 hours as needed (ED). 30 tablet 3    tamsulosin (FLOMAX) 0.4 mg Cap Take 1 capsule (0.4 mg total) by mouth. 90 capsule 3    vibegron (GEMTESA) 75 mg Tab Take 1 tablet (75 mg total) by mouth once daily. 30 tablet 11     No current facility-administered medications on file prior to visit.       /82   Pulse (!) 54   Ht 5' 10" (1.778 m)   Wt 82.9 kg (182 lb 12.2 oz)   SpO2 97%   BMI 26.22 kg/m²     Objective     Physical Exam  Constitutional:       General: He is not in acute distress.     Appearance: Normal appearance. He is well-developed. He is not toxic-appearing or diaphoretic.   HENT:      Head: Normocephalic and atraumatic.      Nose: Nose normal.   Eyes:      General:         Right eye: No discharge.         Left eye: No discharge.      Conjunctiva/sclera:      Right eye: Right conjunctiva is not injected.      Left eye: Left conjunctiva is not injected.      Pupils: Pupils are equal.      Right eye: Pupil is round.      Left eye: Pupil is round.   Neck:      Thyroid: No thyromegaly.      Vascular: No carotid bruit or JVD.   Cardiovascular:      Rate and Rhythm: Normal rate and regular rhythm. No extrasystoles are present.     Chest Wall: PMI is not displaced.      Pulses:           Radial pulses are 2+ on the right side and 2+ on the left side.        Femoral pulses are 2+ on the right side and 2+ on the left side.       Dorsalis pedis pulses are 2+ on the right side and 2+ on the left side.        Posterior tibial pulses are 2+ on the right side and 2+ on the left side.      Heart sounds: S1 normal and S2 normal.      No gallop.   Pulmonary:      Effort: Pulmonary effort is normal.      Breath sounds: " Normal breath sounds.   Abdominal:      Palpations: Abdomen is soft.      Tenderness: There is no abdominal tenderness.   Musculoskeletal:      Cervical back: Neck supple.      Right lower leg: Normal. No swelling. No edema.      Left lower leg: Normal. No swelling. No edema.   Lymphadenopathy:      Head:      Right side of head: No submandibular adenopathy.      Left side of head: No submandibular adenopathy.      Cervical: No cervical adenopathy.   Skin:     General: Skin is warm and dry.      Findings: No rash.      Nails: There is no clubbing.   Neurological:      General: No focal deficit present.      Mental Status: He is alert and oriented to person, place, and time. He is not disoriented.      Cranial Nerves: No cranial nerve deficit.   Psychiatric:         Attention and Perception: Attention normal.         Mood and Affect: Mood and affect normal.         Speech: Speech normal.         Behavior: Behavior normal.         Thought Content: Thought content normal.         Cognition and Memory: Cognition and memory normal.         Judgment: Judgment normal.         Assessment and Plan     1. S/P patent foramen ovale closure    2. Atrioventricular block, first degree    3. History of ischemic vertebrobasilar artery brainstem stroke    4. Atherosclerosis of aorta    5. Primary hypertension    6. Overweight    7. Erectile dysfunction, unspecified erectile dysfunction type        Plan:    History of Closed PFO  2019: Cryptogenic CVA.  2019 & 2021: PFO closure.  9/27/2021: Echo: No shunting.  On aspirin 81 mg Q24.  2026: Plan next Echo.    2. Atrioventricular Block, First Degree   5/20/2024: ECG: AV1.  ms.   To be followed.    3. History of Cerebrovascular Accident   2019: CVA: Affected vision.    4. Atherosclerosis of Aorta   4/13/2023: Calcium score 21. [25-50th]. Mild aortic calcification.   On atorvastatin 20 mg Q24.   4/13/2014: Chol 171. HDL 64. LDL 98. TG 46.   On atorvastatin 20 mg Q24.   Favorable  lipid panel.   On aspirin 81 mg Q24.    5. Hypertension   2019: Diagnosed.   Appears well controlled without any antihypertensive.    6. Overweight   6/10/2024: Weight 84 kg. BMI 27.    7. Prostate Cancer   2023: Diagnosed.   7/18/2024: Plan is prostatectomy.    8. Primary Care   In transition.    F/u 6 months.    Dawit Claudio M.D.

## 2024-09-18 ENCOUNTER — TELEPHONE (OUTPATIENT)
Dept: PRIMARY CARE CLINIC | Facility: CLINIC | Age: 64
End: 2024-09-18
Payer: MEDICARE

## 2024-10-21 ENCOUNTER — ANESTHESIA EVENT (OUTPATIENT)
Dept: SURGERY | Facility: HOSPITAL | Age: 64
End: 2024-10-21
Payer: MEDICARE

## 2024-10-21 NOTE — ANESTHESIA PREPROCEDURE EVALUATION
10/21/2024  Gt Banerjee is a 64 y.o., male.      Pre-op Assessment    I have reviewed the Patient Summary Reports.     I have reviewed the Nursing Notes. I have reviewed the NPO Status.   I have reviewed the Medications.     Review of Systems  Anesthesia Hx:  No problems with previous Anesthesia   History of prior surgery of interest to airway management or planning:  Previous anesthesia: General 2021 PFO closure with general anesthesia.        Denies Family Hx of Anesthesia complications.    Denies Personal Hx of Anesthesia complications.                    Social:  Non-Smoker, Social Alcohol Use       Hematology/Oncology:                      Current/Recent Cancer.  Other (see Oncology comments)          Oncology Comments: Prostate CA     Cardiovascular:  Exercise tolerance: good   Hypertension    Denies CAD.          PVD     Hx PFO s/p closure 2021  Dilated aortic root    Normal EF                           Pulmonary:  Pulmonary Normal        Incidental Lung Nodule               Renal/:   Denies Chronic Renal Disease.  BPH              Hepatic/GI:  Hepatic/GI Normal                    Musculoskeletal:  Musculoskeletal Normal                Neurological:   CVA, no residual symptoms                                    Endocrine:  Endocrine Normal            Psych:  Psychiatric Normal                Physical Exam  General: Well nourished and Cooperative    Airway:  Mallampati: II   Mouth Opening: Normal  TM Distance: Normal  Neck ROM: Normal ROM    Dental:  Intact    Anesthesia Plan  Type of Anesthesia, risks & benefits discussed:    Anesthesia Type: Gen ETT, Gen Supraglottic Airway  Intra-op Monitoring Plan: Standard ASA Monitors  Post Op Pain Control Plan: multimodal analgesia and peripheral nerve block  Induction:  IV  Airway Plan: Video  Informed Consent: Informed consent signed with the Patient and  all parties understand the risks and agree with anesthesia plan.  All questions answered.   ASA Score: 3  Day of Surgery Review of History & Physical: H&P Update referred to the surgeon/provider.  Anesthesia Plan Notes:   Will Discuss TAP block  Recent Labs  Needs T&S    Remove bubbles form IVF out of caution (no shunting on last echo)    Cardiac clearance in epic - however patient wishes to explore obtaining a repeat echo preop    Spoke with pharmacy and IV amoxicillin not available however recommended either ancef or ampicillin for endocarditis prophylaxis as both have coverage; will proceed with ancef    Ready For Surgery From Anesthesia Perspective.     .

## 2024-10-22 ENCOUNTER — ANESTHESIA (OUTPATIENT)
Dept: SURGERY | Facility: HOSPITAL | Age: 64
End: 2024-10-22
Payer: MEDICARE

## 2024-10-22 ENCOUNTER — HOSPITAL ENCOUNTER (OUTPATIENT)
Facility: HOSPITAL | Age: 64
Discharge: HOME | End: 2024-10-22
Attending: OPHTHALMOLOGY | Admitting: OPHTHALMOLOGY
Payer: MEDICARE

## 2024-10-22 VITALS
BODY MASS INDEX: 26.48 KG/M2 | OXYGEN SATURATION: 92 % | WEIGHT: 185 LBS | HEIGHT: 70 IN | TEMPERATURE: 98 F | RESPIRATION RATE: 27 BRPM | SYSTOLIC BLOOD PRESSURE: 132 MMHG | DIASTOLIC BLOOD PRESSURE: 74 MMHG | HEART RATE: 68 BPM

## 2024-10-22 DIAGNOSIS — H02.831 DERMATOCHALASIS OF BOTH UPPER EYELIDS: Primary | ICD-10-CM

## 2024-10-22 DIAGNOSIS — H02.834 DERMATOCHALASIS OF BOTH UPPER EYELIDS: Primary | ICD-10-CM

## 2024-10-22 PROCEDURE — 25000003 PHARM REV CODE 250

## 2024-10-22 PROCEDURE — 36000706: Performed by: OPHTHALMOLOGY

## 2024-10-22 PROCEDURE — D9220A PRA ANESTHESIA: Mod: ,,, | Performed by: NURSE ANESTHETIST, CERTIFIED REGISTERED

## 2024-10-22 PROCEDURE — 71000033 HC RECOVERY, INTIAL HOUR: Performed by: OPHTHALMOLOGY

## 2024-10-22 PROCEDURE — 71000015 HC POSTOP RECOV 1ST HR: Performed by: OPHTHALMOLOGY

## 2024-10-22 PROCEDURE — 37000009 HC ANESTHESIA EA ADD 15 MINS: Performed by: OPHTHALMOLOGY

## 2024-10-22 PROCEDURE — 94761 N-INVAS EAR/PLS OXIMETRY MLT: CPT

## 2024-10-22 PROCEDURE — 11200 RMVL SKIN TAGS UP TO&INC 15: CPT | Mod: 51,HCNC,, | Performed by: OPHTHALMOLOGY

## 2024-10-22 PROCEDURE — 99900035 HC TECH TIME PER 15 MIN (STAT)

## 2024-10-22 PROCEDURE — 15823 BLEPHARP UPR EYELID XCSV SKN: CPT | Mod: 51,50,HCNC, | Performed by: OPHTHALMOLOGY

## 2024-10-22 PROCEDURE — 94799 UNLISTED PULMONARY SVC/PX: CPT

## 2024-10-22 PROCEDURE — A4216 STERILE WATER/SALINE, 10 ML: HCPCS | Performed by: NURSE ANESTHETIST, CERTIFIED REGISTERED

## 2024-10-22 PROCEDURE — 63600175 PHARM REV CODE 636 W HCPCS: Performed by: OPHTHALMOLOGY

## 2024-10-22 PROCEDURE — 63600175 PHARM REV CODE 636 W HCPCS: Performed by: NURSE ANESTHETIST, CERTIFIED REGISTERED

## 2024-10-22 PROCEDURE — 67900 REPAIR BROW DEFECT: CPT | Mod: 50,HCNC,, | Performed by: OPHTHALMOLOGY

## 2024-10-22 PROCEDURE — 25000003 PHARM REV CODE 250: Performed by: NURSE ANESTHETIST, CERTIFIED REGISTERED

## 2024-10-22 PROCEDURE — 36000707: Performed by: OPHTHALMOLOGY

## 2024-10-22 PROCEDURE — 37000008 HC ANESTHESIA 1ST 15 MINUTES: Performed by: OPHTHALMOLOGY

## 2024-10-22 RX ORDER — CEFAZOLIN SODIUM 1 G/3ML
INJECTION, POWDER, FOR SOLUTION INTRAMUSCULAR; INTRAVENOUS
Status: DISCONTINUED | OUTPATIENT
Start: 2024-10-22 | End: 2024-10-22

## 2024-10-22 RX ORDER — BUPIVACAINE HYDROCHLORIDE 5 MG/ML
INJECTION, SOLUTION EPIDURAL; INTRACAUDAL
Status: DISCONTINUED | OUTPATIENT
Start: 2024-10-22 | End: 2024-10-22 | Stop reason: HOSPADM

## 2024-10-22 RX ORDER — EPHEDRINE SULFATE 50 MG/ML
INJECTION, SOLUTION INTRAVENOUS
Status: DISCONTINUED | OUTPATIENT
Start: 2024-10-22 | End: 2024-10-22

## 2024-10-22 RX ORDER — PROPOFOL 10 MG/ML
VIAL (ML) INTRAVENOUS
Status: DISCONTINUED | OUTPATIENT
Start: 2024-10-22 | End: 2024-10-22

## 2024-10-22 RX ORDER — MIDAZOLAM HYDROCHLORIDE 1 MG/ML
INJECTION INTRAMUSCULAR; INTRAVENOUS
Status: DISCONTINUED | OUTPATIENT
Start: 2024-10-22 | End: 2024-10-22

## 2024-10-22 RX ORDER — FENTANYL CITRATE 50 UG/ML
25 INJECTION, SOLUTION INTRAMUSCULAR; INTRAVENOUS EVERY 5 MIN PRN
Status: DISCONTINUED | OUTPATIENT
Start: 2024-10-22 | End: 2024-10-22 | Stop reason: HOSPADM

## 2024-10-22 RX ORDER — HYDROMORPHONE HYDROCHLORIDE 1 MG/ML
0.2 INJECTION, SOLUTION INTRAMUSCULAR; INTRAVENOUS; SUBCUTANEOUS EVERY 5 MIN PRN
Status: DISCONTINUED | OUTPATIENT
Start: 2024-10-22 | End: 2024-10-22 | Stop reason: HOSPADM

## 2024-10-22 RX ORDER — FENTANYL CITRATE 50 UG/ML
INJECTION, SOLUTION INTRAMUSCULAR; INTRAVENOUS
Status: DISCONTINUED | OUTPATIENT
Start: 2024-10-22 | End: 2024-10-22

## 2024-10-22 RX ORDER — GLUCAGON 1 MG
1 KIT INJECTION
Status: DISCONTINUED | OUTPATIENT
Start: 2024-10-22 | End: 2024-10-22 | Stop reason: HOSPADM

## 2024-10-22 RX ORDER — ROCURONIUM BROMIDE 10 MG/ML
INJECTION, SOLUTION INTRAVENOUS
Status: DISCONTINUED | OUTPATIENT
Start: 2024-10-22 | End: 2024-10-22

## 2024-10-22 RX ORDER — LIDOCAINE HYDROCHLORIDE 20 MG/ML
INJECTION INTRAVENOUS
Status: DISCONTINUED | OUTPATIENT
Start: 2024-10-22 | End: 2024-10-22

## 2024-10-22 RX ORDER — OXYCODONE AND ACETAMINOPHEN 5; 325 MG/1; MG/1
1 TABLET ORAL
Status: DISCONTINUED | OUTPATIENT
Start: 2024-10-22 | End: 2024-10-22 | Stop reason: HOSPADM

## 2024-10-22 RX ORDER — SODIUM CHLORIDE 0.9 % (FLUSH) 0.9 %
SYRINGE (ML) INJECTION
Status: DISCONTINUED | OUTPATIENT
Start: 2024-10-22 | End: 2024-10-22

## 2024-10-22 RX ORDER — DEXAMETHASONE SODIUM PHOSPHATE 4 MG/ML
INJECTION, SOLUTION INTRA-ARTICULAR; INTRALESIONAL; INTRAMUSCULAR; INTRAVENOUS; SOFT TISSUE
Status: DISCONTINUED | OUTPATIENT
Start: 2024-10-22 | End: 2024-10-22

## 2024-10-22 RX ORDER — HYDROCODONE BITARTRATE AND ACETAMINOPHEN 5; 325 MG/1; MG/1
1 TABLET ORAL EVERY 6 HOURS PRN
Qty: 16 TABLET | Refills: 0 | Status: SHIPPED | OUTPATIENT
Start: 2024-10-22

## 2024-10-22 RX ORDER — NEOMYCIN SULFATE, POLYMYXIN B SULFATE, AND DEXAMETHASONE 3.5; 10000; 1 MG/G; [USP'U]/G; MG/G
OINTMENT OPHTHALMIC EVERY 8 HOURS
Qty: 3.5 G | Refills: 1 | Status: SHIPPED | OUTPATIENT
Start: 2024-10-22 | End: 2024-10-30

## 2024-10-22 RX ORDER — ONDANSETRON HYDROCHLORIDE 2 MG/ML
4 INJECTION, SOLUTION INTRAVENOUS DAILY PRN
Status: DISCONTINUED | OUTPATIENT
Start: 2024-10-22 | End: 2024-10-22 | Stop reason: HOSPADM

## 2024-10-22 RX ORDER — LIDOCAINE HYDROCHLORIDE AND EPINEPHRINE 20; 10 MG/ML; UG/ML
INJECTION, SOLUTION INFILTRATION; PERINEURAL
Status: DISCONTINUED | OUTPATIENT
Start: 2024-10-22 | End: 2024-10-22 | Stop reason: HOSPADM

## 2024-10-22 RX ORDER — LIDOCAINE HCL/EPINEPHRINE/PF 2%-1:200K
VIAL (ML) INJECTION
Status: DISCONTINUED | OUTPATIENT
Start: 2024-10-22 | End: 2024-10-22 | Stop reason: HOSPADM

## 2024-10-22 RX ORDER — TETRACAINE HYDROCHLORIDE 5 MG/ML
SOLUTION OPHTHALMIC
Status: DISCONTINUED | OUTPATIENT
Start: 2024-10-22 | End: 2024-10-22 | Stop reason: HOSPADM

## 2024-10-22 RX ORDER — PROCHLORPERAZINE EDISYLATE 5 MG/ML
5 INJECTION INTRAMUSCULAR; INTRAVENOUS EVERY 30 MIN PRN
Status: DISCONTINUED | OUTPATIENT
Start: 2024-10-22 | End: 2024-10-22 | Stop reason: HOSPADM

## 2024-10-22 RX ORDER — ONDANSETRON HYDROCHLORIDE 2 MG/ML
INJECTION, SOLUTION INTRAVENOUS
Status: DISCONTINUED | OUTPATIENT
Start: 2024-10-22 | End: 2024-10-22

## 2024-10-22 RX ADMIN — SUGAMMADEX 200 MG: 100 INJECTION, SOLUTION INTRAVENOUS at 11:10

## 2024-10-22 RX ADMIN — MIDAZOLAM HYDROCHLORIDE 2 MG: 1 INJECTION, SOLUTION INTRAMUSCULAR; INTRAVENOUS at 08:10

## 2024-10-22 RX ADMIN — CEFAZOLIN 2 G: 330 INJECTION, POWDER, FOR SOLUTION INTRAMUSCULAR; INTRAVENOUS at 08:10

## 2024-10-22 RX ADMIN — SODIUM CHLORIDE 30 ML: 9 INJECTION, SOLUTION INTRAMUSCULAR; INTRAVENOUS; SUBCUTANEOUS at 09:10

## 2024-10-22 RX ADMIN — EPHEDRINE SULFATE 5 MG: 50 INJECTION INTRAVENOUS at 09:10

## 2024-10-22 RX ADMIN — LIDOCAINE HYDROCHLORIDE 40 MG: 20 INJECTION INTRAVENOUS at 08:10

## 2024-10-22 RX ADMIN — ONDANSETRON 4 MG: 2 INJECTION INTRAMUSCULAR; INTRAVENOUS at 08:10

## 2024-10-22 RX ADMIN — DEXAMETHASONE SODIUM PHOSPHATE 4 MG: 4 INJECTION INTRA-ARTICULAR; INTRALESIONAL; INTRAMUSCULAR; INTRAVENOUS; SOFT TISSUE at 08:10

## 2024-10-22 RX ADMIN — PROPOFOL 20 MG: 10 INJECTION, EMULSION INTRAVENOUS at 10:10

## 2024-10-22 RX ADMIN — FENTANYL CITRATE 50 MCG: 50 INJECTION, SOLUTION INTRAMUSCULAR; INTRAVENOUS at 10:10

## 2024-10-22 RX ADMIN — PROPOFOL 170 MG: 10 INJECTION, EMULSION INTRAVENOUS at 08:10

## 2024-10-22 RX ADMIN — FENTANYL CITRATE 50 MCG: 50 INJECTION, SOLUTION INTRAMUSCULAR; INTRAVENOUS at 08:10

## 2024-10-22 RX ADMIN — ROCURONIUM BROMIDE 50 MG: 10 INJECTION INTRAVENOUS at 08:10

## 2024-10-22 NOTE — TRANSFER OF CARE
Anesthesia Transfer of Care Note    Patient: Gt Banerjee    Procedure(s) Performed: Procedure(s) (LRB):  BLEPHAROPLASTY (Bilateral)  REPAIR,BROW PTOSIS (Bilateral)  EXCISION, LESION, EYELID (Left)    Patient location: PACU    Anesthesia Type: general    Transport from OR: Transported from OR on 6-10 L/min O2 by face mask with adequate spontaneous ventilation    Post pain: adequate analgesia    Post assessment: no apparent anesthetic complications    Post vital signs: stable    Level of consciousness: awake    Nausea/Vomiting: no nausea/vomiting    Complications: none    Transfer of care protocol was followed      Last vitals: See PACU flowsheet

## 2024-10-22 NOTE — ANESTHESIA PROCEDURE NOTES
Intubation    Date/Time: 10/22/2024 8:34 AM    Performed by: Pauline Arreola CRNA  Authorized by: Fermín Hinson MD    Intubation:     Induction:  Intravenous    Intubated:  Postinduction    Mask Ventilation:  Easy mask    Attempts:  1    Attempted By:  CRNA    Method of Intubation:  Video laryngoscopy    Blade:  [place] 3    Laryngeal View Grade: Grade I - full view of cords      Difficult Airway Encountered?: No      Complications:  None    Airway Device:  Oral endotracheal tube    Airway Device Size:  7.5    Style/Cuff Inflation:  Cuffed (inflated to minimal occlusive pressure)    Tube secured:  22    Secured at:  The lips    Placement Verified By:  Capnometry    Complicating Factors:  None    Findings Post-Intubation:  BS equal bilateral and atraumatic/condition of teeth unchanged

## 2024-10-22 NOTE — ANESTHESIA POSTPROCEDURE EVALUATION
Anesthesia Post Evaluation    Patient: Gt Banerjee    Procedure(s) Performed: Procedure(s) (LRB):  BLEPHAROPLASTY (Bilateral)  REPAIR,BROW PTOSIS (Bilateral)  EXCISION, LESION, EYELID (Left)    Final Anesthesia Type: general      Patient location during evaluation: PACU  Patient participation: Yes- Able to Participate  Level of consciousness: awake and alert  Post-procedure vital signs: reviewed and stable  Pain management: adequate  Airway patency: patent    PONV status at discharge: No PONV  Anesthetic complications: no      Cardiovascular status: stable  Respiratory status: spontaneous ventilation  Hydration status: euvolemic  Follow-up not needed.          Vitals Value Taken Time   /76 10/22/24 1217   Temp 36.5 °C (97.7 °F) 10/22/24 1122   Pulse 67 10/22/24 1225   Resp 21 10/22/24 1225   SpO2 94 % 10/22/24 1225   Vitals shown include unfiled device data.      Event Time   Out of Recovery 11:53:00         Pain/Araceli Score: Araceli Score: 10 (10/22/2024 11:50 AM)

## 2024-10-23 ENCOUNTER — PATIENT MESSAGE (OUTPATIENT)
Dept: UROLOGY | Facility: CLINIC | Age: 64
End: 2024-10-23
Payer: MEDICARE

## 2024-10-23 DIAGNOSIS — N39.3 STRESS INCONTINENCE: Primary | ICD-10-CM

## 2024-10-30 ENCOUNTER — OFFICE VISIT (OUTPATIENT)
Dept: OPHTHALMOLOGY | Facility: CLINIC | Age: 64
End: 2024-10-30
Payer: MEDICARE

## 2024-10-30 DIAGNOSIS — H02.831 DERMATOCHALASIS OF BOTH UPPER EYELIDS: ICD-10-CM

## 2024-10-30 DIAGNOSIS — Z98.890 POST-OPERATIVE STATE: Primary | ICD-10-CM

## 2024-10-30 DIAGNOSIS — H02.834 DERMATOCHALASIS OF BOTH UPPER EYELIDS: ICD-10-CM

## 2024-10-30 DIAGNOSIS — H57.813 BROW PTOSIS, BILATERAL: ICD-10-CM

## 2024-10-30 DIAGNOSIS — L91.8 SKIN TAG: ICD-10-CM

## 2024-10-30 PROCEDURE — 99999 PR PBB SHADOW E&M-EST. PATIENT-LVL III: CPT | Mod: PBBFAC,HCNC,, | Performed by: OPHTHALMOLOGY

## 2024-10-30 RX ORDER — NEOMYCIN SULFATE, POLYMYXIN B SULFATE, AND DEXAMETHASONE 3.5; 10000; 1 MG/G; [USP'U]/G; MG/G
OINTMENT OPHTHALMIC
Qty: 1 EACH | Refills: 1 | Status: SHIPPED | OUTPATIENT
Start: 2024-10-30

## 2024-12-19 ENCOUNTER — OFFICE VISIT (OUTPATIENT)
Dept: OPHTHALMOLOGY | Facility: CLINIC | Age: 64
End: 2024-12-19
Payer: MEDICARE

## 2024-12-19 DIAGNOSIS — Z98.890 POST-OPERATIVE STATE: Primary | ICD-10-CM

## 2024-12-19 DIAGNOSIS — H02.831 DERMATOCHALASIS OF BOTH UPPER EYELIDS: ICD-10-CM

## 2024-12-19 DIAGNOSIS — H57.813 BROW PTOSIS, BILATERAL: ICD-10-CM

## 2024-12-19 DIAGNOSIS — H02.834 DERMATOCHALASIS OF BOTH UPPER EYELIDS: ICD-10-CM

## 2024-12-19 PROCEDURE — 99999 PR PBB SHADOW E&M-EST. PATIENT-LVL III: CPT | Mod: PBBFAC,HCNC,, | Performed by: OPHTHALMOLOGY

## 2024-12-19 NOTE — PROGRESS NOTES
HPI    Gt Banerjee is a/an 64 y.o. male here for 6 week post op.  Date of procedure: 10/22/24  Procedure: roddy brow plasty and roddy upper bleph   Oral antibiotics: none   Eye meds: none    Patient doing well following procedure. No pain or discomfort.     Last edited by Simi Glass on 12/19/2024 11:10 AM.            Assessment /Plan     For exam results, see Encounter Report.    Post-operative state  -     External Photography - OU - Both Eyes    Dermatochalasis of both upper eyelids    Brow ptosis, bilateral      Patient doing well! Post-operative instructions reviewed. All questions answered. Return prn or sooner any worsening of vision/symptoms or any concerns.

## 2025-01-17 ENCOUNTER — OFFICE VISIT (OUTPATIENT)
Dept: INTERNAL MEDICINE | Facility: CLINIC | Age: 65
End: 2025-01-17
Payer: MEDICARE

## 2025-01-17 VITALS
BODY MASS INDEX: 26.81 KG/M2 | WEIGHT: 187.25 LBS | HEIGHT: 70 IN | OXYGEN SATURATION: 98 % | HEART RATE: 64 BPM | SYSTOLIC BLOOD PRESSURE: 110 MMHG | DIASTOLIC BLOOD PRESSURE: 80 MMHG

## 2025-01-17 DIAGNOSIS — E53.8 VITAMIN B12 DEFICIENCY: ICD-10-CM

## 2025-01-17 DIAGNOSIS — E78.5 HYPERLIPIDEMIA, UNSPECIFIED HYPERLIPIDEMIA TYPE: ICD-10-CM

## 2025-01-17 DIAGNOSIS — E55.9 VITAMIN D DEFICIENCY DISEASE: ICD-10-CM

## 2025-01-17 DIAGNOSIS — Z12.11 COLON CANCER SCREENING: ICD-10-CM

## 2025-01-17 DIAGNOSIS — C61 PROSTATE CANCER: Primary | ICD-10-CM

## 2025-01-17 DIAGNOSIS — R91.1 SOLITARY PULMONARY NODULE: ICD-10-CM

## 2025-01-17 DIAGNOSIS — R79.9 ABNORMAL BLOOD CHEMISTRY: ICD-10-CM

## 2025-01-17 PROCEDURE — 99999 PR PBB SHADOW E&M-EST. PATIENT-LVL V: CPT | Mod: PBBFAC,,, | Performed by: INTERNAL MEDICINE

## 2025-01-17 PROCEDURE — 3008F BODY MASS INDEX DOCD: CPT | Mod: HCNC,CPTII,S$GLB, | Performed by: INTERNAL MEDICINE

## 2025-01-17 PROCEDURE — 1159F MED LIST DOCD IN RCRD: CPT | Mod: HCNC,CPTII,S$GLB, | Performed by: INTERNAL MEDICINE

## 2025-01-17 PROCEDURE — 3288F FALL RISK ASSESSMENT DOCD: CPT | Mod: HCNC,CPTII,S$GLB, | Performed by: INTERNAL MEDICINE

## 2025-01-17 PROCEDURE — 3074F SYST BP LT 130 MM HG: CPT | Mod: HCNC,CPTII,S$GLB, | Performed by: INTERNAL MEDICINE

## 2025-01-17 PROCEDURE — 99204 OFFICE O/P NEW MOD 45 MIN: CPT | Mod: HCNC,S$GLB,, | Performed by: INTERNAL MEDICINE

## 2025-01-17 PROCEDURE — 1101F PT FALLS ASSESS-DOCD LE1/YR: CPT | Mod: HCNC,CPTII,S$GLB, | Performed by: INTERNAL MEDICINE

## 2025-01-17 PROCEDURE — 3079F DIAST BP 80-89 MM HG: CPT | Mod: HCNC,CPTII,S$GLB, | Performed by: INTERNAL MEDICINE

## 2025-01-17 NOTE — PROGRESS NOTES
Chief Complaint: Establish care    HPI: This is a 64 year old man who presents to [facility]    He is feeling well today.  Energy level is good.     He had a prostatectomy 7/18/2024 due to prostate cancer.   No pelvic pain.  No dysuria or hematuria.  Slight incontinence - gotten much better, almost completely resolved. No constipation or diarrhea. He is taking Gemtesa 75 mg for urination.       Had a cryptogenic stroke  July 2019 (Visual loss that returned).  Dr Gloria did a PFO closure 12/11/2019  and 12/4/2020. Followed by Dr Claudio - last visit 9/13/24. Takes aspirin 81 mg daily and atorvastatin 20 mg daily due to aortic atherosclerosis. Coronary calcium score 21 in 4/2023.     Past Medical History:   Diagnosis Date    Prostate cancer     Stroke        Past Surgical History:   Procedure Laterality Date    BLEPHAROPLASTY Bilateral 10/22/2024    Procedure: BLEPHAROPLASTY;  Surgeon: Bhargavi Huerta MD;  Location: [facility];  Service: Ophthalmology;  Laterality: Bilateral;    CLOSURE, PFO  2019    EXCISION OF LESION OF EYELID Left 10/22/2024    Procedure: EXCISION, LESION, EYELID;  Surgeon: Bhargavi Huerta MD;  Location: [facility] OR;  Service: Ophthalmology;  Laterality: Left;    PROSTATE BIOPSY  10/24/2023    REPAIR,BROW PTOSIS Bilateral 10/22/2024    Procedure: REPAIR,BROW PTOSIS;  Surgeon: Bhargavi Huerta MD;  Location: [facility];  Service: Ophthalmology;  Laterality: Bilateral;    ROBOT-ASSISTED LAPAROSCOPIC PELVIC LYMPHADENECTOMY Bilateral 7/18/2024    Procedure: ROBOTIC LYMPHADENECTOMY, PELVIS;  Surgeon: Jonas Moncada MD;  Location: [facility];  Service: Urology;  Laterality: Bilateral;    ROBOT-ASSISTED LAPAROSCOPIC PROSTATECTOMY N/A 7/18/2024    Procedure: ROBOTIC PROSTATECTOMY;  Surgeon: Jonas Moncada MD;  Location: [facility];  Service: Urology;  Laterality: N/A;    TRANSESOPHAGEAL ECHOCARDIOGRAPHY  02/17/2021    Procedure: Echocardiogram, Transesophageal;  Surgeon: Nhung Humphreys MD;  Location: [facility]  CATH LAB;  Service: Cardiology;;    VASECTOMY       Social History     Socioeconomic History    Marital status:    Tobacco Use    Smoking status: Never    Smokeless tobacco: Never   Substance and Sexual Activity    Alcohol use: Yes     Alcohol/week: 1.0 standard drink of alcohol     Types: 1 Shots of liquor per week     Comment: 2 drinks a night.    Drug use: Never    Sexual activity: Not Currently     Partners: Female   Social History Narrative    .  2 children, healthy.   Semi retired - real estate business.      Social Drivers of Health     Financial Resource Strain: Low Risk  (3/5/2024)    Overall Financial Resource Strain (CARDIA)     Difficulty of Paying Living Expenses: Not hard at all   Food Insecurity: No Food Insecurity (3/5/2024)    Hunger Vital Sign     Worried About Running Out of Food in the Last Year: Never true     Ran Out of Food in the Last Year: Never true   Transportation Needs: No Transportation Needs (3/5/2024)    PRAPARE - Transportation     Lack of Transportation (Medical): No     Lack of Transportation (Non-Medical): No   Physical Activity: Insufficiently Active (3/5/2024)    Exercise Vital Sign     Days of Exercise per Week: 2 days     Minutes of Exercise per Session: 60 min   Stress: No Stress Concern Present (3/5/2024)    [place] citizen [place] [employer] - Occupational Stress Questionnaire     Feeling of Stress : Not at all   Housing Stability: Low Risk  (3/5/2024)    Housing Stability Vital Sign     Unable to Pay for Housing in the Last Year: No     Number of Places Lived in the Last Year: 1     Unstable Housing in the Last Year: No     Family Status   Relation Name Status    Mother   at age 85    Father   at age 89    Sister  Alive    Sister  Alive    Neg Hx  (Not Specified)   No partnership data on file             Meds and allergies: updated on Epic    REVIEW OF SYSTEMS: No fevers, chills, night sweats, fatigue, visual change, hearing loss, sinus  "congestion, sore throat, chest pain, shortness of breath, nausea, vomiting, constipation, diarrhea, dysuria, hematuria, polydipsia, polyuria, joint pain, muscle pain, headaches, anxiety, depression, insomnia.     Physical exam:  /80 (BP Location: Right arm, Patient Position: Sitting)   Pulse 64   Ht 5' 10" (1.778 m)   Wt 84.9 kg (187 lb 4.5 oz)   SpO2 98%   BMI 26.87 kg/m²     General: alert, oriented x 3, no apparent distress.  Affect normal  HEENT: Conjunctivae: anicteric, PERRL, EOMI, TM clear, Oralpharynx clear  Neck: supple, no thyroid enlargement, no cervical lymphadenopathy  Resp: effort normal, lungs clear bilaterally  CV: Regular rate and rhythm without murmurs, gallops or rubs, no lower extremity edema, no carotid bruits, 2+ posterior tibial pulses  ABDOMEN: soft, non distended, non tender, bowel sounds present, no hepatosplenomgaly  BREAST: no abn seen, no nodules palpated, no axillary lad    Medical records reviewed in EPIC>     Assessment/Plan:    Prostate cancer - Diagnostic PSA    Aortic atherosclerosis and CT coronary calcium score ws 21 - on aspirin and aortvastatin 20 mg daliy    PFO - s/p closure - to follow up with DR Claudio.     Pulmonary nodule on CT scan chest 2023 - CT scan chest without contrast.     Screening Colonoscopy     Discussed vaccines - he will think about it.    I will see him back in a year, sooner if problems arise.       "

## 2025-01-27 ENCOUNTER — HOSPITAL ENCOUNTER (OUTPATIENT)
Dept: RADIOLOGY | Facility: HOSPITAL | Age: 65
Discharge: HOME | End: 2025-01-27
Attending: INTERNAL MEDICINE
Payer: MEDICARE

## 2025-01-27 DIAGNOSIS — R91.1 SOLITARY PULMONARY NODULE: ICD-10-CM

## 2025-01-27 PROCEDURE — 71250 CT THORAX DX C-: CPT | Mod: 26,HCNC,, | Performed by: RADIOLOGY

## 2025-01-27 PROCEDURE — 71250 CT THORAX DX C-: CPT | Mod: TC,HCNC

## 2025-02-07 ENCOUNTER — TELEPHONE (OUTPATIENT)
Dept: ENDOSCOPY | Facility: HOSPITAL | Age: 65
End: 2025-02-07
Payer: MEDICARE

## 2025-02-07 NOTE — TELEPHONE ENCOUNTER
Contacted the patient to schedule an endoscopy procedure(s) Colonoscopy. The patient did not answer the call and portal message sent requesting a call back.

## 2025-02-08 ENCOUNTER — PATIENT MESSAGE (OUTPATIENT)
Dept: INTERNAL MEDICINE | Facility: CLINIC | Age: 65
End: 2025-02-08
Payer: MEDICARE

## 2025-02-08 DIAGNOSIS — R91.1 SOLITARY PULMONARY NODULE: Primary | ICD-10-CM

## 2025-02-10 PROBLEM — I77.810 ASCENDING AORTA DILATATION: Status: ACTIVE | Noted: 2025-02-10

## 2025-02-11 ENCOUNTER — TELEPHONE (OUTPATIENT)
Dept: ENDOSCOPY | Facility: HOSPITAL | Age: 65
End: 2025-02-11
Payer: MEDICARE

## 2025-02-11 VITALS — HEIGHT: 70 IN | WEIGHT: 182 LBS | BODY MASS INDEX: 26.05 KG/M2

## 2025-02-11 DIAGNOSIS — Z12.11 COLON CANCER SCREENING: Primary | ICD-10-CM

## 2025-02-11 RX ORDER — SODIUM, POTASSIUM,MAG SULFATES 17.5-3.13G
1 SOLUTION, RECONSTITUTED, ORAL ORAL DAILY
Qty: 1 KIT | Refills: 0 | Status: SHIPPED | OUTPATIENT
Start: 2025-02-11 | End: 2025-02-13

## 2025-02-11 NOTE — TELEPHONE ENCOUNTER
Referral for procedure from  Workqueue referral (see Appts tab)      Spoke to patient to schedule procedure(s) Colonoscopy       Physician to perform procedure(s) Dr. DOYLE De Los Santos  Date of Procedure (s) 3/27/25  Arrival Time 7:00 AM  Time of Procedure(s) 8:00 AM   Location of Procedure(s) [place] 2nd Floor  Type of Rx Prep sent to patient: Suprep  Instructions provided to patient via [facility]    Patient was informed on the following information and verbalized understanding. Screening questionnaire reviewed with patient and complete. If procedure requires anesthesia, a responsible adult needs to be present to accompany the patient home, patient cannot drive after receiving anesthesia. Appointment details are tentative, especially check-in time. Patient will receive a prep-op call 7 days prior to confirm check-in time for procedure. If applicable the patient should contact their pharmacy to verify Rx for procedure prep is ready for pick-up. Patient was advised to call the scheduling department at [phone] if pharmacy states no Rx is available. Patient was advised to call the endoscopy scheduling department if any questions or concerns arise.      [facility] Endoscopy Scheduling Department

## 2025-02-18 ENCOUNTER — PATIENT MESSAGE (OUTPATIENT)
Dept: ADMINISTRATIVE | Facility: HOSPITAL | Age: 65
End: 2025-02-18
Payer: MEDICARE

## 2025-02-22 DIAGNOSIS — Z00.00 ENCOUNTER FOR MEDICARE ANNUAL WELLNESS EXAM: ICD-10-CM

## 2025-03-07 ENCOUNTER — ANESTHESIA EVENT (OUTPATIENT)
Dept: ENDOSCOPY | Facility: HOSPITAL | Age: 65
End: 2025-03-07
Payer: MEDICARE

## 2025-03-07 NOTE — ANESTHESIA PREPROCEDURE EVALUATION
03/07/2025  Gt Banerjee is a 64 y.o., male.  [facility]  Anesthesia Pre-Operative Evaluation       Patient Name: Gt Banerjee  YOB: 1960  MRN: [id number]  CSN: [id number]      Code Status: Prior   Date of Procedure: 3/27/2025  Anesthesia: Choice Procedure: Procedure(s) (LRB):  COLONOSCOPY, SCREENING, LOW RISK PATIENT (N/A)  Pre-Operative Diagnosis: Colon cancer screening [Z12.11]  Proceduralist: Surgeons and Role:     * Phillip De Los Santos MD - Primary        SUBJECTIVE:   Gt Banerjee is a 64 y.o. male who  has a past medical history of Prostate cancer and Stroke..     he has a current medication list which includes the following long-term medication(s): aspirin, atorvastatin, and sildenafil.     ALLERGIES:   Review of patient's allergies indicates:  No Known Allergies  LDA:          Lines/Drains/Airways       None                  Anesthesia Evaluation      Airway   Mallampati: I  TM distance: Normal  Neck ROM: Normal ROM  Dental    (+) Intact    Pulmonary    Cardiovascular   (+) hypertension, dysrhythmias (AV block first degree)    Rate: Normal    Neuro/Psych    (+) CVA (Cryptogenic stroke) no residual symptoms    GI/Hepatic/Renal      Endo/Other    Abdominal                     MEDICATIONS:     Antibiotics (From admission, onward)      None          VTE Risk Mitigation (From admission, onward)      None              Current Medications[1]       History:   There are no hospital problems to display for this patient.    Surgical History:    has a past surgical history that includes Vasectomy; Transesophageal echocardiography (02/17/2021); Prostate biopsy (10/24/2023); closure, pfo (2019); Robot-assisted laparoscopic prostatectomy (N/A, 7/18/2024); Robot-assisted laparoscopic pelvic lymphadenectomy (Bilateral, 7/18/2024); Blepharoplasty (Bilateral, 10/22/2024); repair,brow  ptosis (Bilateral, 10/22/2024); and Excision of lesion of eyelid (Left, 10/22/2024).   Social History:    reports that he is not currently sexually active and has had partner(s) who are female.  reports that he has never smoked. He has never used smokeless tobacco. He reports current alcohol use of about 1.0 standard drink of alcohol per week. He reports that he does not use drugs.     OBJECTIVE:     Vital Signs (Most Recent):    Vital Signs Range (Last 24H):          There is no height or weight on file to calculate BMI.   Wt Readings from Last 4 Encounters:   02/11/25 82.6 kg (182 lb)   01/17/25 84.9 kg (187 lb 4.5 oz)   10/22/24 83.9 kg (185 lb)   09/13/24 82.9 kg (182 lb 12.2 oz)       Significant Labs:  Lab Results   Component Value Date    WBC 3.75 (L) 01/27/2025    HGB 15.1 01/27/2025    HCT 43.0 01/27/2025     01/27/2025     01/27/2025    K 4.2 01/27/2025     01/27/2025    CREATININE 0.9 01/27/2025    BUN 26 (H) 01/27/2025    CO2 26 01/27/2025     01/27/2025    CALCIUM 9.2 01/27/2025    MG 2.2 09/27/2021    PHOS 3.1 07/12/2011    ALKPHOS 51 01/27/2025    ALT 18 01/27/2025    AST 18 01/27/2025    ALBUMIN 4.1 01/27/2025    INR 1.0 02/17/2021    APTT 24.1 02/17/2021    HGBA1C 4.9 01/27/2025    CPK 1642 (H) 07/12/2011    CPKMB 1.6 07/12/2011    TROPONINI <0.006 07/12/2011    MB 0.1 07/12/2011     No LMP for male patient.  No results found for this or any previous visit (from the past 72 hours).    EKG:   Results for orders placed or performed in visit on 05/20/24   IN OFFICE EKG 12-LEAD (to [place])    Collection Time: 05/20/24  9:55 AM   Result Value Ref Range    QRS Duration 96 ms    [facility] QTC Calculation 371 ms    Narrative    Test Reason : Z01.810,    Vent. Rate : 047 BPM     Atrial Rate : 047 BPM     P-R Int : 320 ms          QRS Dur : 096 ms      QT Int : 420 ms       P-R-T Axes : 007 -17 036 degrees     QTc Int : 371 ms    Sinus bradycardia with sinus arrhythmia with 1st degree A-V  "block  Inferior infarct ,age undetermined  Abnormal ECG  When compared with ECG of 17-FEB-2021 10:12,  No significant change was found  Confirmed by Jaylene CANA[place], Melchor ([id number]) on 5/20/2024 12:39:46 PM    Referred By:             Confirmed By:Melchor Mariee MD       TTE:  Results for orders placed or performed during the hospital encounter of 09/27/21   Echo Color Flow Doppler? Yes; Bubble Contrast? Yes (s/p PFO closure)   Result Value Ref Range    Ascending aorta 4.10 cm    STJ 3.56 cm    AV mean gradient 5 mmHg    Ao peak danielle 1.26 m/s    Ao VTI 33.75 cm    IVS 0.81 0.6 - 1.1 cm    LA size 4.04 cm    Left Atrium Major Axis 5.01 cm    Left Atrium Minor Axis 5.19 cm    LVIDd 5.31 3.5 - 6.0 cm    LVIDs 3.65 2.1 - 4.0 cm    LVOT diameter 2.03 cm    LVOT peak VTI 24.87 cm    PW 0.99 0.6 - 1.1 cm    MV Peak A Danielle 0.48 m/s    E wave deceleration time 321.66 msec    MV Peak E Danielle 0.45 m/s    PV Peak D Danielle 0.44 m/s    PV Peak S Danielle 0.32 m/s    RA Major Axis 5.23 cm    RA Width 3.53 cm    RVDD 4.37 cm    Sinus 3.60 cm    TAPSE 2.66 cm    TR Max Danielle 1.97 m/s    TDI LATERAL 0.09 m/s    TDI SEPTAL 0.07 m/s    LA WIDTH 3.92 cm    MV stenosis pressure 1/2 time 93.28 ms    LV Diastolic Volume 135.73 mL    LV Systolic Volume 56.10 mL    RV S' 10.94 cm/s    LVOT peak danielle 1.00 m/s    LA Vol (MOD) 65.00 cm3    MV "A" wave duration 11.80 msec    LV LATERAL E/E' RATIO 5.00 m/s    LV SEPTAL E/E' RATIO 6.43 m/s    FS 31 %    LA Vol 68.63 cm3    LV mass 175.07 g    Left Ventricle Relative Wall Thickness 0.37 cm    AV valve area 2.38 cm2    AV Velocity Ratio 0.79     AV index (prosthetic) 0.74     MV valve area p 1/2 method 2.36 cm2    E/A ratio 0.94     Mean e' 0.08 m/s    Pulm vein S/D ratio 0.73     LVOT area 3.2 cm2    LVOT stroke volume 80.45 cm3    AV peak gradient 6 mmHg    E/E' ratio 5.63 m/s    Triscuspid Valve Regurgitation Peak Gradient 16 mmHg    BSA 2.02 m2    LV Systolic Volume Index 27.9 mL/m2    LV Diastolic Volume Index 67.53 mL/m2 "    SAMMY 34.1 mL/m2    LV Mass Index 87 g/m2    SAMMY (MOD) 32.3 mL/m2    Right Atrial Pressure (from IVC) 3 mmHg    EF 65 %    TV resting pulmonary artery pressure 19 mmHg    Narrative    · The left ventricle is normal in size with normal systolic function.  · The estimated ejection fraction is 65%.  · Indeterminate left ventricular diastolic function.  · Mild right ventricular enlargement with normal right ventricular   systolic function.  · The estimated PA systolic pressure is 19 mmHg.  · Normal central venous pressure (3 mmHg).  · The ascending aorta is mildly dilated.  · Negative bubble study. There is no evidence of intracardiac shunting.  · S/p 35mm Amplatzer PFO closure device (with residual shunt) followed by   18mm Amplatzer occluder device.        EF   Date Value Ref Range Status   09/27/2021 65 % Final      No results found for this or any previous visit.  REDD:  Results for orders placed or performed during the hospital encounter of 02/17/21   Transesophageal echo (REDD)   Result Value Ref Range    BSA 2.02 m2    Narrative    · Normal systolic function. The estimated ejection fraction is 55%  · Normal right ventricular size with normal right ventricular systolic   function.  · Biatrial enlargement.  · Initially there was a right to left shunt along the anterior-superior   border of the previously placed septal occluder.  · Status post successful placement of an occluder cribriform 18 mm with no   residual shunt.        Stress Test:  No results found for this or any previous visit.     LHC:  Results for orders placed during the hospital encounter of 02/17/21    Cardiac catheterization    Conclusion  · A successful PFO closure was performed.    Indication    60 y.o. male referred by Nishi Garrett and Donna Braga for PFO closure.  He had a large 12.5mm  PFO closed with a 35mm Amplatzer PFO occluder for embolic stroke a year ago.  Although there was no persistent PFO by intracardiac echo at the time of the  procedure there was a large shunt present at 6 months and 12 months post-procedure.  He was consented for deep sedation or GET and REDD and closure attempt in Lab 1 with Vazquez Menendez.    The patient understands the risks, benefits, and alternatives of angiography and intervention and gives informed consent.    Description of procedure    The patient was brought to the cath lab in the fasting state.  Access was obtained with an 11Fr sheath in the RCFV.  Multiple bubble studies were done with an without Valsalva with REDD.  None of these showed bubbles crossing.  Because the surface echo was so positive, Dr. Menendez and I attempted to cross the device in the region where the leak was seen on transthoracic and transesophageal ECHO previously.  We were able to pass inferiorly to superiorly through the anterior portion of the previously placed Amplatzer PFO closure device.  An 18 mm Amplatzer cribriform ASD device was deployed sandwiching the previous device at the superior edge.  A 2nd sheath was placed to enable proper deployment of the cribriform device.  Bubble study showed no evidence of bubbles crossing after this.  Hemostasis was obtained with manual pressure.      Complications:  None  Estimated blood loss:  None  Contrast used:  5 cc    Findings:    1.  Persistent right-to-left shunt seen on trans thoracic echo but not seen on REDD today.  2.  The location of the trevor device leak was found and crossed with a Glidewire and multipurpose 6 Serbian catheter.  3.  An 18 mm cribriform device was successfully deployed sandwiching the previously placed device.      Summary:    1.  Successful closure of a trevor device PFO closure leak with a Amplatzer cribriform ASD device.    Recommendations:    1.  Dual anti-platelet therapy for 6 months  2.  Surface echo with bubble study and cough Valsalva in 6 months.  3.  No elective GI  or dental procedures for 6 months.  4.  SBE prophylaxis for cause.      MD Dyana Dangelo and  "[facility]  [facility] [address] [id number] [phone]    The clinically important portion of this report has been dictated in the section above. Our Epic reporting system does not allow us to provide a clinically meaningful report without this dictation. Please beware that there may be errors and discrepancies in the computer transcription and all of the clicks required to finalize the report.          The procedure log was documented by No documenter listed and verified by Donnie Gloria MD.    Date: 2/17/2021  Time: 11:50 AM     PFT:  No results found for: "FEV1", "FVC", "ZME5VUD", "TLC", "DLCO"     ASSESSMENT/PLAN:        Pre-op Assessment    I have reviewed the Patient Summary Reports.     I have reviewed the Nursing Notes. I have reviewed the NPO Status.   I have reviewed the Medications.     Review of Systems  Anesthesia Hx:  No problems with previous Anesthesia             Denies Family Hx of Anesthesia complications.    Denies Personal Hx of Anesthesia complications.                    Social:  Non-Smoker, Social Alcohol Use       Hematology/Oncology:  Hematology Normal   Oncology Normal                                   EENT/Dental:  EENT/Dental Normal           Cardiovascular:     Hypertension    Dysrhythmias (AV block first degree)           PFO closure  Ascending aorta dilation                            Pulmonary:  Pulmonary Normal                       Renal/:    BPH              Hepatic/GI:  Hepatic/GI Normal                    Musculoskeletal:  Musculoskeletal Normal                Neurological:   CVA (Cryptogenic stroke), no residual symptoms                                    Endocrine:  Endocrine Normal          Obesity / BMI > 30  Dermatological:  Skin Normal    Psych:  Psychiatric Normal                    Physical Exam  General: Well nourished, Cooperative, Alert and Oriented    Airway:  Mallampati: I   Mouth Opening: Normal  TM " Distance: Normal  Tongue: Normal  Neck ROM: Normal ROM    Dental:  Intact    Chest/Lungs:  Normal Respiratory Rate    Heart:  Rate: Normal  Rhythm: Regular Rhythm        Anesthesia Plan  Type of Anesthesia, risks & benefits discussed:    Anesthesia Type: Gen Natural Airway  Intra-op Monitoring Plan: Standard ASA Monitors  Post Op Pain Control Plan: multimodal analgesia  Induction:  IV  Informed Consent: Informed consent signed with the Patient and all parties understand the risks and agree with anesthesia plan.  All questions answered.   ASA Score: 2  Day of Surgery Review of History & Physical: H&P Update referred to the surgeon/provider.    Ready For Surgery From Anesthesia Perspective.     .           [1]   No current facility-administered medications for this encounter.     Current Outpatient Medications   Medication Sig Dispense Refill    aspirin (ECOTRIN) 81 MG EC tablet Take 1 tablet (81 mg total) by mouth once daily.      atorvastatin (LIPITOR) 20 MG tablet Take 1 tablet (20 mg total) by mouth once daily. 90 tablet 3    multivitamin (ONE DAILY MULTIVITAMIN) per tablet Take 1 tablet by mouth once daily.      sildenafiL (VIAGRA) 100 MG tablet Take 1 tablet (100 mg total) by mouth daily as needed for Erectile Dysfunction. 30 tablet 11    vibegron (GEMTESA) 75 mg Tab Take 1 tablet (75 mg total) by mouth once daily. 30 tablet 11

## 2025-03-11 ENCOUNTER — TELEPHONE (OUTPATIENT)
Dept: GASTROENTEROLOGY | Facility: CLINIC | Age: 65
End: 2025-03-11
Payer: MEDICARE

## 2025-03-11 NOTE — TELEPHONE ENCOUNTER
----- Message from Tatianna sent at 3/11/2025  8:13 AM CDT -----  Name of Who is Calling: Pt  What is the request in detail:Pt would like a call back to audrey 3/27/25 procedure. Please advise thank you   Can the clinic reply by [facility]:no  What Number to Call Back if not in [facility]:Telephone Information:Mobile          [phone]

## 2025-03-12 ENCOUNTER — TELEPHONE (OUTPATIENT)
Dept: ENDOSCOPY | Facility: HOSPITAL | Age: 65
End: 2025-03-12
Payer: MEDICARE

## 2025-03-12 DIAGNOSIS — Z12.11 SCREEN FOR COLON CANCER: Primary | ICD-10-CM

## 2025-03-12 NOTE — TELEPHONE ENCOUNTER
Patient rescheduled.  Original referral for procedure from  Workqueue referral (see Appts tab)    Spoke to Gt Banerjee to reschedule Colonoscopy       Physician to perform procedure(s) Dr. LOKI Burgos  Date of Procedure (s) 4/9/25  Arrival Time 8:45 AM  Time of Procedure(s) 9:45 PM   Location of Procedure(s) [place] 2nd Floor  Type of Rx Prep sent to patient's pharmacy: PEG  Instructions provided to patient via [facility]  Patient denies use of blood thinners, GLP-1 medications, and weight loss medications.  The following information was discussed with patient, and patient verbalized understanding:  Screening questionnaire reviewed with patient and complete. If procedure requires anesthesia, a responsible adult needs to be present to accompany the patient home. Appointment details are tentative, especially check-in time. Patient will receive a pre-op call 7 days prior to appointment to confirm check-in time for procedure. If applicable the patient should contact their pharmacy to verify Rx for procedure prep is ready for pick-up. Patient was instructed to call the scheduling department at [phone] if pharmacy states no Rx is available. Patient was also advised to call the endoscopy scheduling department if any questions or concerns arise.       Endoscopy Scheduling Department

## 2025-03-27 ENCOUNTER — ANESTHESIA (OUTPATIENT)
Dept: ENDOSCOPY | Facility: HOSPITAL | Age: 65
End: 2025-03-27
Payer: MEDICARE

## 2025-03-31 ENCOUNTER — PATIENT MESSAGE (OUTPATIENT)
Dept: UROLOGY | Facility: CLINIC | Age: 65
End: 2025-03-31
Payer: MEDICARE

## 2025-04-01 ENCOUNTER — OFFICE VISIT (OUTPATIENT)
Dept: CARDIOLOGY | Facility: CLINIC | Age: 65
End: 2025-04-01
Attending: INTERNAL MEDICINE
Payer: MEDICARE

## 2025-04-01 VITALS
DIASTOLIC BLOOD PRESSURE: 74 MMHG | HEART RATE: 54 BPM | SYSTOLIC BLOOD PRESSURE: 122 MMHG | WEIGHT: 187.94 LBS | OXYGEN SATURATION: 98 % | BODY MASS INDEX: 26.91 KG/M2 | HEIGHT: 70 IN

## 2025-04-01 DIAGNOSIS — E66.3 OVERWEIGHT: ICD-10-CM

## 2025-04-01 DIAGNOSIS — I44.0 ATRIOVENTRICULAR BLOCK, FIRST DEGREE: ICD-10-CM

## 2025-04-01 DIAGNOSIS — Z87.74 STATUS POST PERCUTANEOUS PATENT FORAMEN OVALE CLOSURE: ICD-10-CM

## 2025-04-01 DIAGNOSIS — I10 PRIMARY HYPERTENSION: ICD-10-CM

## 2025-04-01 DIAGNOSIS — I70.0 ATHEROSCLEROSIS OF AORTA: ICD-10-CM

## 2025-04-01 DIAGNOSIS — I77.810 ASCENDING AORTA DILATATION: ICD-10-CM

## 2025-04-01 DIAGNOSIS — Q21.12 PATENT FORAMEN OVALE: ICD-10-CM

## 2025-04-01 DIAGNOSIS — Z86.73 HISTORY OF ISCHEMIC VERTEBROBASILAR ARTERY BRAINSTEM STROKE: ICD-10-CM

## 2025-04-01 DIAGNOSIS — C61 PROSTATE CANCER: ICD-10-CM

## 2025-04-01 LAB
OHS QRS DURATION: 98 MS
OHS QTC CALCULATION: 365 MS

## 2025-04-01 PROCEDURE — 3008F BODY MASS INDEX DOCD: CPT | Mod: HCNC,CPTII,S$GLB, | Performed by: INTERNAL MEDICINE

## 2025-04-01 PROCEDURE — 3074F SYST BP LT 130 MM HG: CPT | Mod: HCNC,CPTII,S$GLB, | Performed by: INTERNAL MEDICINE

## 2025-04-01 PROCEDURE — 1126F AMNT PAIN NOTED NONE PRSNT: CPT | Mod: HCNC,CPTII,S$GLB, | Performed by: INTERNAL MEDICINE

## 2025-04-01 PROCEDURE — 99215 OFFICE O/P EST HI 40 MIN: CPT | Mod: HCNC,25,S$GLB, | Performed by: INTERNAL MEDICINE

## 2025-04-01 PROCEDURE — 93010 ELECTROCARDIOGRAM REPORT: CPT | Mod: HCNC,S$GLB,, | Performed by: INTERNAL MEDICINE

## 2025-04-01 PROCEDURE — 3288F FALL RISK ASSESSMENT DOCD: CPT | Mod: HCNC,CPTII,S$GLB, | Performed by: INTERNAL MEDICINE

## 2025-04-01 PROCEDURE — 3044F HG A1C LEVEL LT 7.0%: CPT | Mod: HCNC,CPTII,S$GLB, | Performed by: INTERNAL MEDICINE

## 2025-04-01 PROCEDURE — 99999 PR PBB SHADOW E&M-EST. PATIENT-LVL III: CPT | Mod: PBBFAC,HCNC,, | Performed by: INTERNAL MEDICINE

## 2025-04-01 PROCEDURE — 1159F MED LIST DOCD IN RCRD: CPT | Mod: HCNC,CPTII,S$GLB, | Performed by: INTERNAL MEDICINE

## 2025-04-01 PROCEDURE — 3078F DIAST BP <80 MM HG: CPT | Mod: HCNC,CPTII,S$GLB, | Performed by: INTERNAL MEDICINE

## 2025-04-01 PROCEDURE — 1160F RVW MEDS BY RX/DR IN RCRD: CPT | Mod: HCNC,CPTII,S$GLB, | Performed by: INTERNAL MEDICINE

## 2025-04-01 PROCEDURE — 93005 ELECTROCARDIOGRAM TRACING: CPT | Mod: HCNC

## 2025-04-01 PROCEDURE — 1101F PT FALLS ASSESS-DOCD LE1/YR: CPT | Mod: HCNC,CPTII,S$GLB, | Performed by: INTERNAL MEDICINE

## 2025-04-01 RX ORDER — ATORVASTATIN CALCIUM 40 MG/1
40 TABLET, FILM COATED ORAL DAILY
Qty: 90 TABLET | Refills: 3 | Status: SHIPPED | OUTPATIENT
Start: 2025-04-01

## 2025-04-01 NOTE — PROGRESS NOTES
Subjective     Gt Banerjee is a 64 y.o. male with hypertension. 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 He was diagnosed with a patent foramen ovale. He had the patent foramen ovale closed in 2019 and then an additional procedure in 2021. An echocardiogram in 2021 did not reveal any shunting, He has a first degree atrioventricular block. He was diagnosed with prostate cancer. He underwent robotic prostatectomy on 7/18/2024. He denies any chest pain or exertional shortness of breath. No palpitations or weak spells. Feeling well overall.      Heart Problem  This is a chronic problem. The current episode started more than 1 year ago. The problem occurs constantly. Pertinent negatives include no abdominal pain, anorexia, arthralgias, change in bowel habit, chest pain, chills, congestion, coughing, diaphoresis, fatigue, fever, headaches, joint swelling, myalgias, nausea, neck pain, numbness, rash, sore throat, swollen glands, urinary symptoms, vertigo, visual change, vomiting or weakness.   Cerebrovascular Accident  Pertinent negatives include no abdominal pain, anorexia, arthralgias, change in bowel habit, chest pain, chills, congestion, coughing, diaphoresis, fatigue, fever, headaches, joint swelling, myalgias, nausea, neck pain, numbness, rash, sore throat, swollen glands, urinary symptoms, vertigo, visual change, vomiting or weakness.   Hypertension  Pertinent negatives include no chest pain, headaches, malaise/fatigue, neck pain, orthopnea, palpitations, PND or shortness of breath.       Review of Systems   Constitutional: Negative for chills, diaphoresis, fatigue, fever and malaise/fatigue.   HENT:  Negative for congestion, nosebleeds, sore throat and tinnitus.    Eyes:  Negative for double vision, vision loss in left eye and vision loss in right eye.   Cardiovascular:  Negative for chest pain, claudication, dyspnea on exertion, irregular heartbeat, leg swelling, near-syncope, orthopnea,  "palpitations, paroxysmal nocturnal dyspnea and syncope.   Respiratory:  Negative for cough, hemoptysis, shortness of breath and wheezing.    Endocrine: Negative for cold intolerance and heat intolerance.   Hematologic/Lymphatic: Negative for bleeding problem. Does not bruise/bleed easily.   Skin:  Negative for color change and rash.   Musculoskeletal:  Negative for arthralgias, back pain, falls, joint swelling, muscle weakness, myalgias and neck pain.   Gastrointestinal:  Negative for abdominal pain, anorexia, change in bowel habit, diarrhea, dysphagia, heartburn, hematemesis, hematochezia, hemorrhoids, jaundice, melena, nausea and vomiting.   Genitourinary:  Negative for dysuria and hematuria.   Neurological:  Negative for dizziness, focal weakness, headaches, light-headedness, loss of balance, numbness, tremors, vertigo and weakness.   Psychiatric/Behavioral:  Negative for altered mental status, depression and memory loss. The patient is not nervous/anxious.    Allergic/Immunologic: Negative for hives and persistent infections.       Current Outpatient Medications on File Prior to Visit   Medication Sig Dispense Refill    aspirin (ECOTRIN) 81 MG EC tablet Take 1 tablet (81 mg total) by mouth once daily.      atorvastatin (LIPITOR) 20 MG tablet Take 1 tablet (20 mg total) by mouth once daily. 90 tablet 3    multivitamin (ONE DAILY MULTIVITAMIN) per tablet Take 1 tablet by mouth once daily.      polyethylene glycol (COLYTE) 240-22.72-6.72 -5.84 gram SolR Take 4,000 mLs by mouth once. Take according to instructions provided by Endoscopy Nurse. for 1 dose 4000 mL 0    sildenafiL (VIAGRA) 100 MG tablet Take 1 tablet (100 mg total) by mouth daily as needed for Erectile Dysfunction. 30 tablet 11    vibegron (GEMTESA) 75 mg Tab Take 1 tablet (75 mg total) by mouth once daily. 30 tablet 11     No current facility-administered medications on file prior to visit.       /74   Pulse (!) 54   Ht 5' 10" (1.778 m)   Wt " 85.2 kg (187 lb 15.1 oz)   SpO2 98%   BMI 26.97 kg/m²     Objective     Physical Exam  Constitutional:       General: He is not in acute distress.     Appearance: Normal appearance. He is well-developed. He is not toxic-appearing or diaphoretic.   HENT:      Head: Normocephalic and atraumatic.      Nose: Nose normal.   Eyes:      General:         Right eye: No discharge.         Left eye: No discharge.      Conjunctiva/sclera:      Right eye: Right conjunctiva is not injected.      Left eye: Left conjunctiva is not injected.      Pupils: Pupils are equal.      Right eye: Pupil is round.      Left eye: Pupil is round.   Neck:      Thyroid: No thyromegaly.      Vascular: No carotid bruit or JVD.   Cardiovascular:      Rate and Rhythm: Normal rate and regular rhythm. No extrasystoles are present.     Chest Wall: PMI is not displaced.      Pulses:           Radial pulses are 2+ on the right side and 2+ on the left side.        Femoral pulses are 2+ on the right side and 2+ on the left side.       Dorsalis pedis pulses are 2+ on the right side and 2+ on the left side.        Posterior tibial pulses are 2+ on the right side and 2+ on the left side.      Heart sounds: S1 normal and S2 normal.      No gallop.   Pulmonary:      Effort: Pulmonary effort is normal.      Breath sounds: Normal breath sounds.   Abdominal:      Palpations: Abdomen is soft.      Tenderness: There is no abdominal tenderness.   Musculoskeletal:      Cervical back: Neck supple.      Right lower leg: Normal. No swelling. No edema.      Left lower leg: Normal. No swelling. No edema.   Lymphadenopathy:      Head:      Right side of head: No submandibular adenopathy.      Left side of head: No submandibular adenopathy.      Cervical: No cervical adenopathy.   Skin:     General: Skin is warm and dry.      Findings: No rash.      Nails: There is no clubbing.   Neurological:      General: No focal deficit present.      Mental Status: He is alert and  oriented to person, place, and time. He is not disoriented.      Cranial Nerves: No cranial nerve deficit.   Psychiatric:         Attention and Perception: Attention normal.         Mood and Affect: Mood and affect normal.         Speech: Speech normal.         Behavior: Behavior normal.         Thought Content: Thought content normal.         Cognition and Memory: Cognition and memory normal.         Judgment: Judgment normal.         Assessment and Plan     1. Patent foramen ovale    2. Status post percutaneous patent foramen ovale closure    3. Atrioventricular block, first degree    4. History of ischemic vertebrobasilar artery brainstem stroke    5. Ascending aorta dilatation    6. Atherosclerosis of aorta    7. Primary hypertension    8. Overweight    9. Prostate cancer        Plan:    Patent Foramen Ovale  2019: Cryptogenic CVA.  2019 & 2021: PFO closure.  9/27/2021: Echo: No shunting.  On aspirin 81 mg Q24.  1/2026: Plan next Echo.    2. Atrioventricular Block, First Degree   5/20/2024: ECG: AV1.  ms.   4/1/2025: ECG.    3. History of Cerebrovascular Accident   2019: CVA: Affected vision.    4. Ascending Aorta Dilatation   1/27/2025: CT Chest: Fusiform dilatation of the ascending thoracic aorta measuring approximately 44 mm.   1/2026: Do CT Chest and Echo.    5. Atherosclerosis of Aorta   4/13/2023: Calcium score 21. [25-50th]. Mild aortic calcification.   On atorvastatin 20 mg Q24.   4/13/2014: Chol 171. HDL 64. LDL 98. TG 46.   1/27/2025: Chol 161. HDL 64. LDL 88. TG 45.   On atorvastatin 20 mg Q24.   Favorable but probably not optimal lipid panel.   4/1/2025: Atorvastatin 20 mg Q24 to be increased to atorvastatin 40 mg Q24. In 4 weeks: Lipid panel and Lp (a).   On aspirin 81 mg Q24.    6. Hypertension   2019: Diagnosed.   Appears well controlled without any antihypertensive.    7. Overweight   6/10/2024: Weight 84 kg. BMI 27.    8. Prostate Cancer   2023: Diagnosed.   7/18/2024: Prostatectomy.    9.  Primary Care   In transition.    F/u 6 months.    Dawit Claudio M.D.

## 2025-04-02 ENCOUNTER — TELEPHONE (OUTPATIENT)
Dept: ENDOSCOPY | Facility: HOSPITAL | Age: 65
End: 2025-04-02
Payer: MEDICARE

## 2025-04-09 ENCOUNTER — HOSPITAL ENCOUNTER (OUTPATIENT)
Facility: HOSPITAL | Age: 65
Discharge: HOME | End: 2025-04-09
Attending: INTERNAL MEDICINE | Admitting: INTERNAL MEDICINE
Payer: MEDICARE

## 2025-04-09 VITALS
RESPIRATION RATE: 17 BRPM | TEMPERATURE: 98 F | OXYGEN SATURATION: 99 % | HEART RATE: 52 BPM | SYSTOLIC BLOOD PRESSURE: 136 MMHG | DIASTOLIC BLOOD PRESSURE: 81 MMHG

## 2025-04-09 DIAGNOSIS — Z12.11 SCREEN FOR COLON CANCER: ICD-10-CM

## 2025-04-09 DIAGNOSIS — Z12.11 COLON CANCER SCREENING: ICD-10-CM

## 2025-04-09 PROCEDURE — 27201089 HC SNARE, DISP (ANY): Performed by: INTERNAL MEDICINE

## 2025-04-09 PROCEDURE — 25000003 PHARM REV CODE 250: Performed by: NURSE ANESTHETIST, CERTIFIED REGISTERED

## 2025-04-09 PROCEDURE — 45385 COLONOSCOPY W/LESION REMOVAL: CPT | Mod: PT,HCNC,, | Performed by: INTERNAL MEDICINE

## 2025-04-09 PROCEDURE — 37000008 HC ANESTHESIA 1ST 15 MINUTES: Performed by: INTERNAL MEDICINE

## 2025-04-09 PROCEDURE — 88305 TISSUE EXAM BY PATHOLOGIST: CPT | Mod: TC | Performed by: INTERNAL MEDICINE

## 2025-04-09 PROCEDURE — 94761 N-INVAS EAR/PLS OXIMETRY MLT: CPT

## 2025-04-09 PROCEDURE — 63600175 PHARM REV CODE 636 W HCPCS: Performed by: NURSE ANESTHETIST, CERTIFIED REGISTERED

## 2025-04-09 PROCEDURE — 37000009 HC ANESTHESIA EA ADD 15 MINS: Performed by: INTERNAL MEDICINE

## 2025-04-09 PROCEDURE — 99900035 HC TECH TIME PER 15 MIN (STAT)

## 2025-04-09 PROCEDURE — 45385 COLONOSCOPY W/LESION REMOVAL: CPT | Mod: PT | Performed by: INTERNAL MEDICINE

## 2025-04-09 RX ORDER — LIDOCAINE HYDROCHLORIDE 20 MG/ML
INJECTION INTRAVENOUS
Status: DISCONTINUED | OUTPATIENT
Start: 2025-04-09 | End: 2025-04-09

## 2025-04-09 RX ORDER — PROPOFOL 10 MG/ML
VIAL (ML) INTRAVENOUS CONTINUOUS PRN
Status: DISCONTINUED | OUTPATIENT
Start: 2025-04-09 | End: 2025-04-09

## 2025-04-09 RX ORDER — SODIUM CHLORIDE 9 MG/ML
INJECTION, SOLUTION INTRAVENOUS CONTINUOUS
Status: DISCONTINUED | OUTPATIENT
Start: 2025-04-09 | End: 2025-04-09 | Stop reason: HOSPADM

## 2025-04-09 RX ORDER — PROPOFOL 10 MG/ML
VIAL (ML) INTRAVENOUS
Status: DISCONTINUED | OUTPATIENT
Start: 2025-04-09 | End: 2025-04-09

## 2025-04-09 RX ADMIN — PROPOFOL 70 MG: 10 INJECTION, EMULSION INTRAVENOUS at 10:04

## 2025-04-09 RX ADMIN — LIDOCAINE HYDROCHLORIDE 30 MG: 20 INJECTION INTRAVENOUS at 10:04

## 2025-04-09 RX ADMIN — PROPOFOL 150 MCG/KG/MIN: 10 INJECTION, EMULSION INTRAVENOUS at 10:04

## 2025-04-09 RX ADMIN — SODIUM CHLORIDE: 0.9 INJECTION, SOLUTION INTRAVENOUS at 10:04

## 2025-04-09 NOTE — H&P
[facility]  History & Physical    Subjective:      Chief Complaint/Reason for Admission:     Direct access screening colonoscopy    Gt Banerjee is a 64 y.o. male.    Past Medical History:   Diagnosis Date    Prostate cancer     Stroke      Past Surgical History:   Procedure Laterality Date    BLEPHAROPLASTY Bilateral 10/22/2024    Procedure: BLEPHAROPLASTY;  Surgeon: Bhargavi Huerta MD;  Location: [facility] OR;  Service: Ophthalmology;  Laterality: Bilateral;    CLOSURE, PFO  2019    EXCISION OF LESION OF EYELID Left 10/22/2024    Procedure: EXCISION, LESION, EYELID;  Surgeon: Bhargavi Huerta MD;  Location: [facility] OR;  Service: Ophthalmology;  Laterality: Left;    PROSTATE BIOPSY  10/24/2023    REPAIR,BROW PTOSIS Bilateral 10/22/2024    Procedure: REPAIR,BROW PTOSIS;  Surgeon: Bhargavi Huerta MD;  Location: [facility] OR;  Service: Ophthalmology;  Laterality: Bilateral;    ROBOT-ASSISTED LAPAROSCOPIC PELVIC LYMPHADENECTOMY Bilateral 7/18/2024    Procedure: ROBOTIC LYMPHADENECTOMY, PELVIS;  Surgeon: Jonas Moncada MD;  Location: [facility] OR;  Service: Urology;  Laterality: Bilateral;    ROBOT-ASSISTED LAPAROSCOPIC PROSTATECTOMY N/A 7/18/2024    Procedure: ROBOTIC PROSTATECTOMY;  Surgeon: Jonas Moncada MD;  Location: [facility];  Service: Urology;  Laterality: N/A;    TRANSESOPHAGEAL ECHOCARDIOGRAPHY  02/17/2021    Procedure: Echocardiogram, Transesophageal;  Surgeon: Nhung Humphreys MD;  Location: [facility] CATH LAB;  Service: Cardiology;;    VASECTOMY       Social History[1]    PTA Medications   Medication Sig    aspirin (ECOTRIN) 81 MG EC tablet Take 1 tablet (81 mg total) by mouth once daily.    atorvastatin (LIPITOR) 40 MG tablet Take 1 tablet (40 mg total) by mouth once daily.    multivitamin (ONE DAILY MULTIVITAMIN) per tablet Take 1 tablet by mouth once daily.    sildenafiL (VIAGRA) 100 MG tablet Take 1 tablet (100 mg total) by mouth daily as needed for Erectile Dysfunction.    vibegron  (GEMTESA) 75 mg Tab Take 1 tablet (75 mg total) by mouth once daily.     Review of patient's allergies indicates:  No Known Allergies     Review of Systems   Constitutional:  Negative for fever.       Objective:      Vital Signs (Most Recent)  Temp: 97.4 °F (36.3 °C) (04/09/25 0917)  Pulse: (!) 54 (04/09/25 0917)  Resp: 18 (04/09/25 0917)  BP: (!) 155/86 (04/09/25 0917)  SpO2: 99 % (04/09/25 0917)    Vital Signs Range (Last 24H):  Temp:  [97.4 °F (36.3 °C)]   Pulse:  [54]   Resp:  [18]   BP: (155)/(86)   SpO2:  [99 %]     Physical Exam  Cardiovascular:      Rate and Rhythm: Bradycardia present.   Pulmonary:      Effort: Pulmonary effort is normal.   Neurological:      Mental Status: He is alert and oriented to person, place, and time.             Assessment:      Direct access screening colonoscopy      Plan:    Direct access screening colonoscopy           [1]   Social History  Tobacco Use    Smoking status: Never    Smokeless tobacco: Never   Substance Use Topics    Alcohol use: Yes     Alcohol/week: 1.0 standard drink of alcohol     Types: 1 Shots of liquor per week     Comment: 2 drinks a night.    Drug use: Never

## 2025-04-09 NOTE — TRANSFER OF CARE
Anesthesia Transfer of Care Note    Patient: Gt Banerjee    Procedure(s) Performed: Procedure(s) (LRB):  COLONOSCOPY, SCREENING, LOW RISK PATIENT (N/A)    Patient location: PACU    Anesthesia Type: general    Transport from OR: Transported from OR on room air with adequate spontaneous ventilation    Post pain: adequate analgesia    Post assessment: no apparent anesthetic complications and tolerated procedure well    Post vital signs: stable    Level of consciousness: sedated    Nausea/Vomiting: no nausea/vomiting    Complications: none    Transfer of care protocol was followed      Last vitals: Visit Vitals  BP (!) 155/86   Pulse (!) 54   Temp 36.3 °C (97.4 °F)   Resp 18   SpO2 98%

## 2025-04-09 NOTE — PROVATION PATIENT INSTRUCTIONS
Discharge Summary/Instructions after an Endoscopic Procedure  Patient Name: Gt Banerjee  Patient MRN: [id number]  Patient YOB: 1960 Wednesday, April 9, 2025  Jonas Burgos MD  Dear patient,  As a result of recent federal legislation (The Federal Cures Act), you may   receive lab or pathology results from your procedure in your [facility]   account before your physician is able to contact you. Your physician or   their representative will relay the results to you with their   recommendations at their soonest availability.  Thank you,  RESTRICTIONS:  During your procedure today, you received medications for sedation.  These   medications may affect your judgment, balance and coordination.  Therefore,   for 24 hours, you have the following restrictions:   - DO NOT drive a car, operate machinery, make legal/financial decisions,   sign important papers or drink alcohol.    ACTIVITY:  Today: no heavy lifting, straining or running due to procedural   sedation/anesthesia.  The following day: return to full activity including work.  DIET:  Eat and drink normally unless instructed otherwise.     TREATMENT FOR COMMON SIDE EFFECTS:  - Mild abdominal pain, nausea, belching, bloating or excessive gas:  rest,   eat lightly and use a heating pad.  - Sore Throat: treat with throat lozenges and/or gargle with warm salt   water.  - Because air was used during the procedure, expelling large amounts of air   from your rectum or belching is normal.  - If a bowel prep was taken, you may not have a bowel movement for 1-3 days.    This is normal.  SYMPTOMS TO WATCH FOR AND REPORT TO YOUR PHYSICIAN:  1. Abdominal pain or bloating, other than gas cramps.  2. Chest pain.  3. Back pain.  4. Signs of infection such as: chills or fever occurring within 24 hours   after the procedure.  5. Rectal bleeding, which would show as bright red, maroon, or black stools.   (A tablespoon of blood from the rectum is not serious, especially if    hemorrhoids are present.)  6. Vomiting.  7. Weakness or dizziness.  GO DIRECTLY TO THE NEAREST EMERGENCY ROOM IF YOU HAVE ANY OF THE FOLLOWING:      Difficulty breathing              Chills and/or fever over 101 F   Persistent vomiting and/or vomiting blood   Severe abdominal pain   Severe chest pain   Black, tarry stools   Bleeding- more than one tablespoon   Any other symptom or condition that you feel may need urgent attention  Your doctor recommends these additional instructions:  If any biopsies were taken, your doctors clinic will contact you in 1 to 2   weeks with any results.  - Discharge patient to home.   - Await pathology results.   - Telephone endoscopist for pathology results in 3 weeks.   - Repeat colonoscopy in 5 years for surveillance of multiple polyps.   - Return to referring physician.   - The findings and recommendations were discussed with the patient.  For questions, problems or results please call your physician - Jonas Burgos MD at Work:  [phone].  [facility], EMERGENCY ROOM PHONE NUMBER: [phone]  IF A COMPLICATION OR EMERGENCY SITUATION ARISES AND YOU ARE UNABLE TO REACH   YOUR PHYSICIAN - GO DIRECTLY TO THE EMERGENCY ROOM.  Jonas Burgos MD  4/9/2025 10:41:16 AM  This report has been verified and signed electronically.  Dear patient,  As a result of recent federal legislation (The Federal Cures Act), you may   receive lab or pathology results from your procedure in your [facility]   account before your physician is able to contact you. Your physician or   their representative will relay the results to you with their   recommendations at their soonest availability.  Thank you,  PROVATION

## 2025-04-10 LAB
ESTROGEN SERPL-MCNC: NORMAL PG/ML
INSULIN SERPL-ACNC: NORMAL U[IU]/ML
LAB AP CLINICAL INFORMATION: NORMAL
LAB AP GROSS DESCRIPTION: NORMAL
LAB AP PERFORMING LOCATION(S): NORMAL
LAB AP REPORT FOOTNOTES: NORMAL
T3RU NFR SERPL: NORMAL %

## 2025-04-10 NOTE — ANESTHESIA POSTPROCEDURE EVALUATION
Anesthesia Post Evaluation    Patient: Gt Banerjee    Procedure(s) Performed: Procedure(s) (LRB):  COLONOSCOPY, SCREENING, LOW RISK PATIENT (N/A)    Final Anesthesia Type: general      Patient location during evaluation: GI PACU  Patient participation: Yes- Able to Participate  Level of consciousness: awake and alert and oriented  Post-procedure vital signs: reviewed and stable  Pain management: adequate  Airway patency: patent    PONV status at discharge: No PONV  Anesthetic complications: no      Cardiovascular status: blood pressure returned to baseline and hemodynamically stable  Respiratory status: unassisted, spontaneous ventilation and room air  Hydration status: euvolemic  Follow-up not needed.              Vitals Value Taken Time   /81 04/09/25 10:58   Temp 36.5 °C (97.7 °F) 04/09/25 10:38   Pulse 52 04/09/25 11:03   Resp 19 04/09/25 11:03   SpO2 99 % 04/09/25 11:03   Vitals shown include unfiled device data.      Event Time   Out of Recovery 10:53:00         Pain/Araceli Score: Araceli Score: 9 (4/9/2025 10:53 AM)

## 2025-04-12 ENCOUNTER — PATIENT MESSAGE (OUTPATIENT)
Dept: GASTROENTEROLOGY | Facility: CLINIC | Age: 65
End: 2025-04-12
Payer: MEDICARE

## 2025-04-12 ENCOUNTER — RESULTS FOLLOW-UP (OUTPATIENT)
Dept: GASTROENTEROLOGY | Facility: CLINIC | Age: 65
End: 2025-04-12

## 2025-04-12 NOTE — PROGRESS NOTES
Gt your colonoscopy pathology was benign but at least 1 of your colon polyps was an adenoma recommend your next surveillance colonoscopy in 5 years    Final Diagnosis   1. Colon, ascending, polypectomy:  - Colonic mucosa with superficial hyperplastic change  - Negative for dysplasia or malignancy     2. Colon, descending, polypectomy:  - Tubular adenoma  - Negative for high-grade dysplasia or malignancy      Electronically signed by Barry Vegas MD on 4/10/2025 at 11:43 AM    Microscopic Description  [facility]   Microscopic examination performed.

## 2025-05-05 ENCOUNTER — LAB VISIT (OUTPATIENT)
Dept: LAB | Facility: HOSPITAL | Age: 65
End: 2025-05-05
Attending: INTERNAL MEDICINE
Payer: MEDICARE

## 2025-05-05 ENCOUNTER — RESULTS FOLLOW-UP (OUTPATIENT)
Dept: CARDIOLOGY | Facility: OTHER | Age: 65
End: 2025-05-05

## 2025-05-05 DIAGNOSIS — I70.0 ATHEROSCLEROSIS OF AORTA: ICD-10-CM

## 2025-05-05 LAB
CHOLEST SERPL-MCNC: 140 MG/DL (ref 120–199)
CHOLEST/HDLC SERPL: 2.5 {RATIO} (ref 2–5)
HDLC SERPL-MCNC: 56 MG/DL (ref 40–75)
HDLC SERPL: 40 % (ref 20–50)
LDLC SERPL CALC-MCNC: 71 MG/DL (ref 63–159)
NONHDLC SERPL-MCNC: 84 MG/DL
TRIGL SERPL-MCNC: 65 MG/DL (ref 30–150)

## 2025-05-05 PROCEDURE — 36415 COLL VENOUS BLD VENIPUNCTURE: CPT | Mod: HCNC

## 2025-05-05 PROCEDURE — 80061 LIPID PANEL: CPT | Mod: HCNC

## 2025-05-05 PROCEDURE — 83695 ASSAY OF LIPOPROTEIN(A): CPT | Mod: HCNC

## 2025-05-07 LAB — W LP(A): 5 NMOL/L

## 2025-05-19 ENCOUNTER — TELEPHONE (OUTPATIENT)
Dept: UROLOGY | Facility: CLINIC | Age: 65
End: 2025-05-19
Payer: MEDICARE

## 2025-05-19 NOTE — PROGRESS NOTES
"Subjective:      Gt Banerjee is a 64 y.o. male who returns today regarding his       "No leakage whatsoever"    He stopped Gemtesa  .  Leg cramps when he takes viagra 100mg  Poor response    LISA and ring are not adequate    YESSY 10        The following portions of the patient's history were reviewed and updated as appropriate: allergies, current medications, past family history, past medical history, past social history, past surgical history and problem list.    Review of Systems  Pertinent items are noted in HPI.  A comprehensive multipoint review of systems was negative except as otherwise stated in the HPI.    Past Medical History:   Diagnosis Date    Prostate cancer     Stroke      Past Surgical History:   Procedure Laterality Date    BLEPHAROPLASTY Bilateral 10/22/2024    Procedure: BLEPHAROPLASTY;  Surgeon: Bhargavi Huerta MD;  Location: [facility] OR;  Service: Ophthalmology;  Laterality: Bilateral;    CLOSURE, PFO  2019    COLONOSCOPY, SCREENING, LOW RISK PATIENT N/A 4/9/2025    Procedure: COLONOSCOPY, SCREENING, LOW RISK PATIENT;  Surgeon: Jonas Burgos MD;  Location: [facility] ENDOSCOPY;  Service: Endoscopy;  Laterality: N/A;  Referred by: Dr. Yaritza Funes, suprep, instructions sent to [facility]  3/12/25- pt req Dr. Burgos, r/s to later date. pt req PEG prep, ordered and new instr to portal. DBM  4.2 precall attempted; no vm; AP  4/7-precall complete-KPvt    EXCISION OF LESION OF EYELID Left 10/22/2024    Procedure: EXCISION, LESION, EYELID;  Surgeon: Bhargavi Huerta MD;  Location: [facility] OR;  Service: Ophthalmology;  Laterality: Left;    PROSTATE BIOPSY  10/24/2023    REPAIR,BROW PTOSIS Bilateral 10/22/2024    Procedure: REPAIR,BROW PTOSIS;  Surgeon: Bhargavi Huerta MD;  Location: [facility] OR;  Service: Ophthalmology;  Laterality: Bilateral;    ROBOT-ASSISTED LAPAROSCOPIC PELVIC LYMPHADENECTOMY Bilateral 7/18/2024    Procedure: ROBOTIC LYMPHADENECTOMY, PELVIS;  Surgeon: Jonas Moncada MD;  [facility];  Service: Urology;  Laterality: Bilateral;    ROBOT-ASSISTED LAPAROSCOPIC PROSTATECTOMY N/A 7/18/2024    Procedure: ROBOTIC PROSTATECTOMY;  Surgeon: Jonas Moncada MD;  Location: [facility] OR;  Service: Urology;  Laterality: N/A;    TRANSESOPHAGEAL ECHOCARDIOGRAPHY  02/17/2021    Procedure: Echocardiogram, Transesophageal;  Surgeon: Nhung Humphreys MD;  Location: [facility] CATH LAB;  Service: Cardiology;;    VASECTOMY         Review of patient's allergies indicates:  No Known Allergies       Objective:   Vitals: There were no vitals taken for this visit.    Physical Exam   General: alert and oriented, no acute distress  Respiratory: Symmetric expansion, non-labored breathing  Cardiovascular: no peripheral edema  Abdomen: non distended  Skin: normal coloration and turgor, no rashes, no suspicious skin lesions noted  Neuro: no gross deficits  Psych: normal judgment and insight, normal mood/affect, and non-anxious    Physical Exam    Lab Review   Urinalysis demonstrates   Lab Results   Component Value Date    WBC 3.75 (L) 01/27/2025    HGB 15.1 01/27/2025    HCT 43.0 01/27/2025    MCV 98 01/27/2025     01/27/2025     Lab Results   Component Value Date    CREATININE 0.9 01/27/2025    BUN 26 (H) 01/27/2025     Lab Results   Component Value Date    PSADIAG <0.01 01/27/2025    PSADIAG 0.08 08/12/2024    PSADIAG 7.5 (H) 09/28/2023    PSATOTAL 7.2 (H) 01/18/2023    PSATOTAL 2.8 06/08/2021    PSAFREE 1.12 01/18/2023    PSAFREE 0.45 06/08/2021    PSAFREEPCT 15.56 01/18/2023    PSAFREEPCT 16.07 06/08/2021         Imaging  -    Assessment and Plan:   Prostate cancer  Racine 7 3+4 pT2 pN0 cM0 R0 MICK*1 year     Discussed AUA/SUO definition of biochemical recurrence:  (PSA of 0.2ng/mL and a confirmatory value of 0.2ng/mL or greater following radical prostatectomy) AUA/SUO GUIDELINE 2023     PSA today   If OK, RTC 6 months with repeat PSA    Amanda YOUNG    ED  DC cialis  LISA  Trimix trial with Jessica Valencia NP      OAB;  resolved       UTI resolved  UA and reflex cs     RTC 6 months with psa to see Dr Moncada

## 2025-05-20 ENCOUNTER — TELEPHONE (OUTPATIENT)
Dept: UROLOGY | Facility: CLINIC | Age: 65
End: 2025-05-20

## 2025-05-20 ENCOUNTER — LAB VISIT (OUTPATIENT)
Dept: LAB | Facility: OTHER | Age: 65
End: 2025-05-20
Attending: UROLOGY
Payer: MEDICARE

## 2025-05-20 ENCOUNTER — OFFICE VISIT (OUTPATIENT)
Dept: UROLOGY | Facility: CLINIC | Age: 65
End: 2025-05-20
Payer: MEDICARE

## 2025-05-20 VITALS
OXYGEN SATURATION: 96 % | HEART RATE: 59 BPM | DIASTOLIC BLOOD PRESSURE: 82 MMHG | BODY MASS INDEX: 26.88 KG/M2 | SYSTOLIC BLOOD PRESSURE: 129 MMHG | HEIGHT: 70 IN | WEIGHT: 187.75 LBS

## 2025-05-20 DIAGNOSIS — C61 PROSTATE CANCER: Primary | ICD-10-CM

## 2025-05-20 DIAGNOSIS — C61 PROSTATE CANCER: ICD-10-CM

## 2025-05-20 LAB — PSA SERPL-MCNC: <0.01 NG/ML

## 2025-05-20 PROCEDURE — 3008F BODY MASS INDEX DOCD: CPT | Mod: CPTII,HCNC,S$GLB, | Performed by: UROLOGY

## 2025-05-20 PROCEDURE — 1126F AMNT PAIN NOTED NONE PRSNT: CPT | Mod: CPTII,HCNC,S$GLB, | Performed by: UROLOGY

## 2025-05-20 PROCEDURE — 1159F MED LIST DOCD IN RCRD: CPT | Mod: CPTII,HCNC,S$GLB, | Performed by: UROLOGY

## 2025-05-20 PROCEDURE — 36415 COLL VENOUS BLD VENIPUNCTURE: CPT | Mod: HCNC

## 2025-05-20 PROCEDURE — 1101F PT FALLS ASSESS-DOCD LE1/YR: CPT | Mod: CPTII,HCNC,S$GLB, | Performed by: UROLOGY

## 2025-05-20 PROCEDURE — 84153 ASSAY OF PSA TOTAL: CPT | Mod: HCNC

## 2025-05-20 PROCEDURE — 3074F SYST BP LT 130 MM HG: CPT | Mod: CPTII,HCNC,S$GLB, | Performed by: UROLOGY

## 2025-05-20 PROCEDURE — 3079F DIAST BP 80-89 MM HG: CPT | Mod: CPTII,HCNC,S$GLB, | Performed by: UROLOGY

## 2025-05-20 PROCEDURE — 3288F FALL RISK ASSESSMENT DOCD: CPT | Mod: CPTII,HCNC,S$GLB, | Performed by: UROLOGY

## 2025-05-20 PROCEDURE — 3044F HG A1C LEVEL LT 7.0%: CPT | Mod: CPTII,HCNC,S$GLB, | Performed by: UROLOGY

## 2025-05-20 PROCEDURE — 99214 OFFICE O/P EST MOD 30 MIN: CPT | Mod: HCNC,S$GLB,, | Performed by: UROLOGY

## 2025-05-20 NOTE — Clinical Note
PSA today and PSA in 6 months then see Dr Moncada in 6 months  Jessica, please send [facility] Team, schedule trimix trial with Jessica next available thanks

## 2025-05-20 NOTE — TELEPHONE ENCOUNTER
----- Message from Jonas Moncada MD sent at 5/20/2025  3:17 PM CDT -----  PSA today and PSA in 6 months then see Dr Moncada in 6 months    Jessica, please send [facility]  Team, schedule trimix trial with Jessica next available  thanks

## 2025-05-21 DIAGNOSIS — N52.9 ERECTILE DYSFUNCTION, UNSPECIFIED ERECTILE DYSFUNCTION TYPE: Primary | ICD-10-CM

## 2025-05-21 RX ORDER — PAPAVERINE HYDROCHLORIDE 30 MG/ML
INJECTION INTRAMUSCULAR; INTRAVENOUS
Qty: 10 ML | Refills: 12 | Status: SHIPPED | OUTPATIENT
Start: 2025-05-21

## 2025-06-13 ENCOUNTER — OFFICE VISIT (OUTPATIENT)
Dept: UROLOGY | Facility: CLINIC | Age: 65
End: 2025-06-13
Payer: MEDICARE

## 2025-06-13 VITALS
DIASTOLIC BLOOD PRESSURE: 84 MMHG | WEIGHT: 184.88 LBS | HEART RATE: 87 BPM | BODY MASS INDEX: 26.52 KG/M2 | SYSTOLIC BLOOD PRESSURE: 146 MMHG

## 2025-06-13 DIAGNOSIS — C61 PROSTATE CANCER: Primary | ICD-10-CM

## 2025-06-13 DIAGNOSIS — N52.01 ERECTILE DYSFUNCTION DUE TO ARTERIAL INSUFFICIENCY: ICD-10-CM

## 2025-06-13 NOTE — PATIENT INSTRUCTIONS
Penile Self-Injection Procedure  Dosage: 0.1-0.5 cc  Use no more than 3x/week   Self-injection is a good option if you have erectile dysfunction (ED). You insert a tiny needle into your penis and inject a medicine. This helps your penis get hard and stay that way long enough for sex. And sex and orgasm will feel as good as always. You may be nervous about doing self-injection at first. But with practice, it will get easier. Your healthcare provider will show you how to do self-injection the first time.  Talk to your doctor about any medicines you take and any medical problems you have.      Preparing for injection  Wash your hands well with soap and water.  Prepare the medicine (if needed).  Sit or  a comfortable position in a warm, well-lit room. If you need to, sit or  front of a mirror.  Find an injection site on one side of your penis, in a place with no visible veins. (Dont inject into the top, bottom, or head of the penis.)  Clean the injection site with an alcohol swab. Grasp the head of your penis firmly with your thumb and forefinger (dont just pinch the skin). Stretch the penis so the skin on the shaft is taut.  Injecting the medicine  Rest your penis against your inner thigh and pull it gently toward your knee. Dont twist or rotate it. This way youll be sure to inject the medicine into the spot you chose and cleaned before.  Hold the syringe between your thumb and fingers, like youre holding a pen. Rest your forearm on your thigh for support.  Insert the needle at a 90° angle (perpendicular) to the shaft. Do this quickly to reduce discomfort. (The needle should go in easily. If it doesnt, stop right away.)  Move your thumb to the plunger. Press down to inject the medicine, counting to 5.  Remove the needle and dispose of it safely.  Gaining an erection  Apply pressure to the injection site for a few minutes. This prevents swelling and bruising and helps spread the medicine.   Stand  up. This may help your erection develop. Foreplay often helps, too.  Your penis should become firm within 10 to 20 minutes. The erection will last long enough for sex, and maybe longer.  When to seek medical care  An erection that lasts longer than 3 to 4  hours  Bleeding or bruising  Severe pain  Scarring or curvature of the penis   Date Last Reviewed: 1/7/2017  © 7089-3396 [employer]. [address] [place] [address], [place] [address]. All rights reserved. This information is not intended as a substitute for professional medical care. Always follow your healthcare professional's instructions.

## 2025-06-13 NOTE — PROGRESS NOTES
Subjective:      Gt Banerjee is a 64 y.o. male who presents today for trimix trial.     The patient is s/p RALP with [facility] 7/18/24 with Dr. Moncada.     Doing great!     Incontinence has resolved.    Issues with ED- poor response with viagra/LISA and ring. Presents today for trimix trial. Wife is a RN.     The following portions of the patient's history were reviewed and updated as appropriate: allergies, current medications, past family history, past medical history, past social history, past surgical history and problem list.    Review of Systems  Constitutional: no fever or chills  ENT: no nasal congestion or sore throat  Respiratory: no cough or shortness of breath  Cardiovascular: no chest pain or palpitations  Gastrointestinal: no nausea or vomiting, tolerating diet  Genitourinary: as per HPI  Hematologic/Lymphatic: no easy bruising or lymphadenopathy  Musculoskeletal: no arthralgias or myalgias  Neurological: no seizures or tremors  Behavioral/Psych: no auditory or visual hallucinations     Objective:   Vitals: BP (!) 146/84 (BP Location: Left arm, Patient Position: Sitting)   Pulse 87   Wt 83.8 kg (184 lb 13.7 oz)   BMI 26.52 kg/m²     Physical Exam   General: alert and oriented, no acute distress  Head: normocephalic, atraumatic  Neck: supple, normal ROM  Respiratory: Symmetric expansion, non-labored breathing  Cardiovascular: regular rate and rhythm  Abdomen: soft, non tender, non distended  Genitourinary:   Penis: normal, no lesions, patent orthotopic meatus, no plaques  Skin: normal coloration and turgor, no rashes, no suspicious skin lesions noted  Neuro: alert and oriented x3, no gross deficits  Psych: normal judgment and insight, normal mood/affect, and non-anxious    Lab Review   Urinalysis demonstrates : no specimen  Lab Results   Component Value Date    WBC 3.75 (L) 01/27/2025    HGB 15.1 01/27/2025    HCT 43.0 01/27/2025    MCV 98 01/27/2025     01/27/2025     Lab Results   Component  Value Date    CREATININE 0.9 01/27/2025    BUN 26 (H) 01/27/2025     Lab Results   Component Value Date    PSA <0.01 05/20/2025     Imaging   None    Assessment:     1. Prostate cancer    2. Erectile dysfunction due to arterial insufficiency      Plan:   Diagnoses and all orders for this visit:    Prostate cancer    Erectile dysfunction due to arterial insufficiency    Plan:    He is here today for the Intracorporal injection/ICI education and first injection.  Educational materials were supplied.    ICI is an injectable medication that consists of three different medications to produce an erection. It is known as a Trimix Compound or PEP. The medication is a compound medication and is only mixed up at certain pharmacies known as a compound pharmacy. The medication has to be stored in the refrigerator. Improper storage decreases the effectiveness of the medication.     He was educated that it is an injection. With any injection there is risk for possible pain at the injection site as well as risk for an infection. Clean technique must be followed to help prevent infection. Proper needle disposable is necessary. He voices understanding.    The injection is best given when standing up and the penis is positioned perpendicular to the body. The urethral opening should be facing away from the body. Injection is always given on the lateral or side of the penis. Never give the injection to the top of the penis to avoid possible trauma to arteries and veins. Never give the injection to the bottom of the penis to avoid trauma to the urethra. He should alternate the injection sites to avoid the build up of scar tissue due to multiple injections.    This medication can increase the risk of erections lasting longer than 4 hours. This is known as a priapism and is a medical emergency. This requires immediate medical attention. This is main reason we give the first dose in the office today. We start at low dose and see how well  the patients reacts. We can increase the medication if needed depending on the reaction the patient receives today. We discussed the fine line between enough medication for penetration and too much leading to a priapism. He voices understanding.    He witnessed me draw up 0.1 cc and I injected him in the left lateral aspect of the penis. Within 15 minutes he achieved a partial erection.  Patient instructed to begin with 0.1cc (2 micrograms) and increase as needed up to 0.5cc (10 micrograms). Use no more than 3 times weekly. Rotate injection site. See instructions on package insert which we reviewed.     If have any questions, please give me a call. Educational materials were given to patient and all questions were answered. He voiced understanding and left the office without a priapism.     PT SUPPLIED THE MEDICATION    Follow up as scheduled with Dr. Moncada

## 2025-07-13 ENCOUNTER — PATIENT MESSAGE (OUTPATIENT)
Dept: UROLOGY | Facility: CLINIC | Age: 65
End: 2025-07-13
Payer: MEDICARE

## 2025-08-13 ENCOUNTER — HOSPITAL ENCOUNTER (OUTPATIENT)
Dept: RADIOLOGY | Facility: HOSPITAL | Age: 65
Discharge: HOME | End: 2025-08-13
Attending: INTERNAL MEDICINE
Payer: MEDICARE

## 2025-08-13 DIAGNOSIS — R91.1 SOLITARY PULMONARY NODULE: ICD-10-CM

## 2025-08-13 PROCEDURE — 71250 CT THORAX DX C-: CPT | Mod: TC,HCNC

## 2025-08-13 PROCEDURE — 71250 CT THORAX DX C-: CPT | Mod: 26,HCNC,, | Performed by: STUDENT IN AN ORGANIZED HEALTH CARE EDUCATION/TRAINING PROGRAM

## (undated) DEVICE — COVER DRAPE ACUSON STERILE

## (undated) DEVICE — DROPPER MEDICINE

## (undated) DEVICE — DRAPE EENT SPLIT STERILE

## (undated) DEVICE — Device

## (undated) DEVICE — SHEATH INTRODUCER 6FR 11CM

## (undated) DEVICE — KIT MICROINTRO 4F .018X40X7CM

## (undated) DEVICE — GUIDEWIRE SUPRA CORE 035 190CM

## (undated) DEVICE — SUT 5/0 27IN PDS II VIO MO

## (undated) DEVICE — TOWEL OR XRAY BLUE 17X26IN

## (undated) DEVICE — GOWN ECLIPSE REINF LVL4 TWL XL

## (undated) DEVICE — SPIKE CONTRAST CONTROLLER

## (undated) DEVICE — CATH ANGIO GUID MPA1 6FX100CM

## (undated) DEVICE — PENCIL ROCKER SWITCH 10FT CORD

## (undated) DEVICE — DRAPE STERI INSTRUMENT 1018

## (undated) DEVICE — TUBING SUC UNIV W/CONN 12FT

## (undated) DEVICE — GUIDEWIRE EMERALD 150CM PTFE

## (undated) DEVICE — BLLN SIZING AGA 24MM

## (undated) DEVICE — SEE MEDLINE ITEM 156894

## (undated) DEVICE — NDL HYPO STD REG BVL 18GX1.5IN

## (undated) DEVICE — CATH DXTERITY MPASHA 110CM 6FR

## (undated) DEVICE — CLEANER TIP ELECSURG 2X2IN

## (undated) DEVICE — PROTECTION STATION PLUS

## (undated) DEVICE — SHEATH PINNACLE INTRO 11FR

## (undated) DEVICE — ELECTRODE REM PLYHSV RETURN 9

## (undated) DEVICE — ELECTRODE NEEDLE 1IN

## (undated) DEVICE — APPLICATOR COT TIP WD STRL 3IN

## (undated) DEVICE — CAUTERY HIGH TEMP 2IN FLEXIBLE

## (undated) DEVICE — SPONGE COTTON TRAY 4X4IN

## (undated) DEVICE — BLADE SURG #15 CARBON STEEL

## (undated) DEVICE — MARKER FN REG DUAL UTIL RULER

## (undated) DEVICE — MARKER SKIN RULER STERILE

## (undated) DEVICE — SOL BETADINE 5%

## (undated) DEVICE — DRAPE HALF SURGICAL 40X58IN

## (undated) DEVICE — SOL BSS BALANCED SALT

## (undated) DEVICE — OMNIPAQUE 350 200ML

## (undated) DEVICE — SUT 6/0 18IN COATED VICRYL

## (undated) DEVICE — LINE 60IN PRESSURE MON.

## (undated) DEVICE — PROTECTOR CORNEAL CROUCH ST

## (undated) DEVICE — GUIDEWIRE AMPLATZ .035X260

## (undated) DEVICE — GLOVE BIOGEL SKINSENSE PI 7.5

## (undated) DEVICE — CATH ACUSON ACUNAV 8FR

## (undated) DEVICE — SHIELD LIGHT MEROCEL CORNEAL

## (undated) DEVICE — NDL HYPO A BEVEL 30X1/2

## (undated) DEVICE — SHEATH BRITE TIP 9F 35CM

## (undated) DEVICE — SUT PROLENE 6-0 P-1 18

## (undated) DEVICE — KIT CUSTOM MANIFOLD

## (undated) DEVICE — CATH INFINITI JUDKINS JR4

## (undated) DEVICE — CATH MP 4FR 125CM

## (undated) DEVICE — SYR 10CC LUER LOCK

## (undated) DEVICE — DRAPE STERI-DRAPE 1000 17X11IN

## (undated) DEVICE — SUT 6/0 18IN PROLENE BL MO

## (undated) DEVICE — NDL STRAIGHT 4CM LEIBINGER

## (undated) DEVICE — SOL IRRI STRL WATER 1000ML

## (undated) DEVICE — DRAPE ORTH SPLIT 77X108IN

## (undated) DEVICE — NDL 22GA X1 1/2 REG BEVEL

## (undated) DEVICE — SHEATH AGA TREVISIO 9FR 80CM

## (undated) DEVICE — TRAY MUSCLE LID EYE

## (undated) DEVICE — INSTRUMENT SURG SUCT FRZR W/C

## (undated) DEVICE — GUIDEWIRE STF .035X180CM ANG

## (undated) DEVICE — SUT SILK 4-0 BLK BR G-3G-3

## (undated) DEVICE — CONTAINER SPECIMEN OR STER 4OZ